# Patient Record
Sex: MALE | Race: WHITE | NOT HISPANIC OR LATINO | Employment: OTHER | URBAN - NONMETROPOLITAN AREA
[De-identification: names, ages, dates, MRNs, and addresses within clinical notes are randomized per-mention and may not be internally consistent; named-entity substitution may affect disease eponyms.]

---

## 2017-01-05 ENCOUNTER — APPOINTMENT (OUTPATIENT)
Dept: CARDIOLOGY | Facility: HOSPITAL | Age: 72
End: 2017-01-05

## 2017-01-11 ENCOUNTER — HOSPITAL ENCOUNTER (OUTPATIENT)
Dept: CARDIOLOGY | Facility: HOSPITAL | Age: 72
Discharge: HOME | End: 2017-01-11

## 2017-01-11 ENCOUNTER — OUTSIDE FACILITY SERVICE (OUTPATIENT)
Dept: CARDIOLOGY | Facility: CLINIC | Age: 72
End: 2017-01-11

## 2017-01-11 DIAGNOSIS — I49.8 OTHER CARDIAC ARRHYTHMIA: ICD-10-CM

## 2017-01-11 DIAGNOSIS — I48.91 ATRIAL FIBRILLATION, UNSPECIFIED TYPE (HCC): ICD-10-CM

## 2017-01-11 DIAGNOSIS — R07.9 CHEST PAIN, UNSPECIFIED TYPE: ICD-10-CM

## 2017-01-11 DIAGNOSIS — R06.02 SHORTNESS OF BREATH: ICD-10-CM

## 2017-01-11 LAB
MAXIMAL PREDICTED HEART RATE: 149 BPM
STRESS TARGET HR: 127 BPM

## 2017-01-11 PROCEDURE — 93306 TTE W/DOPPLER COMPLETE: CPT | Performed by: INTERNAL MEDICINE

## 2017-01-11 PROCEDURE — 93017 CV STRESS TEST TRACING ONLY: CPT

## 2017-01-11 PROCEDURE — 93306 TTE W/DOPPLER COMPLETE: CPT

## 2017-01-11 PROCEDURE — 93018 CV STRESS TEST I&R ONLY: CPT | Performed by: INTERNAL MEDICINE

## 2017-01-11 PROCEDURE — 78452 HT MUSCLE IMAGE SPECT MULT: CPT | Performed by: INTERNAL MEDICINE

## 2017-01-11 PROCEDURE — 78452 HT MUSCLE IMAGE SPECT MULT: CPT

## 2017-01-11 PROCEDURE — 25010000002 REGADENOSON 0.4 MG/5ML SOLUTION: Performed by: INTERNAL MEDICINE

## 2017-01-11 PROCEDURE — 0 TECHNETIUM SESTAMIBI: Performed by: INTERNAL MEDICINE

## 2017-01-11 PROCEDURE — A9500 TC99M SESTAMIBI: HCPCS | Performed by: INTERNAL MEDICINE

## 2017-01-11 RX ADMIN — REGADENOSON 0.4 MG: 0.08 INJECTION, SOLUTION INTRAVENOUS at 12:00

## 2017-01-11 RX ADMIN — Medication 1 DOSE: at 12:00

## 2017-01-13 ENCOUNTER — TELEPHONE (OUTPATIENT)
Dept: CARDIOLOGY | Facility: CLINIC | Age: 72
End: 2017-01-13

## 2017-01-17 ENCOUNTER — OFFICE VISIT (OUTPATIENT)
Dept: CARDIOLOGY | Facility: CLINIC | Age: 72
End: 2017-01-17

## 2017-01-17 VITALS
OXYGEN SATURATION: 95 % | SYSTOLIC BLOOD PRESSURE: 144 MMHG | HEIGHT: 69 IN | WEIGHT: 181.2 LBS | DIASTOLIC BLOOD PRESSURE: 99 MMHG | BODY MASS INDEX: 26.84 KG/M2

## 2017-01-17 DIAGNOSIS — R94.39 ABNORMAL STRESS TEST: ICD-10-CM

## 2017-01-17 DIAGNOSIS — I25.119 CORONARY ARTERY DISEASE INVOLVING NATIVE CORONARY ARTERY OF NATIVE HEART WITH ANGINA PECTORIS (HCC): Primary | ICD-10-CM

## 2017-01-17 DIAGNOSIS — I42.9 CARDIOMYOPATHY (HCC): ICD-10-CM

## 2017-01-17 DIAGNOSIS — Z00.00 HEALTHCARE MAINTENANCE: ICD-10-CM

## 2017-01-17 DIAGNOSIS — I10 ESSENTIAL HYPERTENSION: ICD-10-CM

## 2017-01-17 DIAGNOSIS — R93.1 ABNORMAL ECHOCARDIOGRAM: ICD-10-CM

## 2017-01-17 DIAGNOSIS — E78.5 DYSLIPIDEMIA: ICD-10-CM

## 2017-01-17 DIAGNOSIS — E78.5 HYPERLIPIDEMIA, UNSPECIFIED HYPERLIPIDEMIA TYPE: ICD-10-CM

## 2017-01-17 DIAGNOSIS — R06.02 SHORTNESS OF BREATH: ICD-10-CM

## 2017-01-17 DIAGNOSIS — I48.0 PAROXYSMAL ATRIAL FIBRILLATION (HCC): ICD-10-CM

## 2017-01-17 PROCEDURE — 99214 OFFICE O/P EST MOD 30 MIN: CPT | Performed by: NURSE PRACTITIONER

## 2017-01-17 RX ORDER — ALBUTEROL SULFATE 90 UG/1
AEROSOL, METERED RESPIRATORY (INHALATION)
COMMUNITY
Start: 2013-07-23

## 2017-01-17 RX ORDER — GABAPENTIN 800 MG/1
800 TABLET ORAL 3 TIMES DAILY PRN
COMMUNITY
Start: 2013-07-23

## 2017-01-17 RX ORDER — LANSOPRAZOLE 30 MG/1
30 CAPSULE, DELAYED RELEASE ORAL DAILY
COMMUNITY
Start: 2013-07-23

## 2017-01-17 RX ORDER — POTASSIUM CHLORIDE 750 MG/1
TABLET, EXTENDED RELEASE ORAL 2 TIMES DAILY
COMMUNITY
Start: 2014-03-27 | End: 2017-02-01 | Stop reason: SDUPTHER

## 2017-01-17 RX ORDER — MELOXICAM 7.5 MG/1
TABLET ORAL DAILY
COMMUNITY
Start: 2017-01-09 | End: 2017-01-17 | Stop reason: SDDI

## 2017-01-17 RX ORDER — CLOPIDOGREL BISULFATE 75 MG/1
75 TABLET ORAL DAILY
Qty: 30 TABLET | Refills: 11 | Status: SHIPPED | OUTPATIENT
Start: 2017-01-17 | End: 2017-01-18 | Stop reason: SDUPTHER

## 2017-01-17 RX ORDER — AMLODIPINE BESYLATE 5 MG/1
5 TABLET ORAL DAILY
COMMUNITY
Start: 2014-08-20 | End: 2017-10-27 | Stop reason: SDUPTHER

## 2017-01-17 RX ORDER — TAMSULOSIN HYDROCHLORIDE 0.4 MG/1
1 CAPSULE ORAL DAILY
COMMUNITY
Start: 2017-01-09 | End: 2019-01-07 | Stop reason: SDUPTHER

## 2017-01-17 RX ORDER — LUBIPROSTONE 24 UG/1
24 CAPSULE, GELATIN COATED ORAL DAILY PRN
COMMUNITY
Start: 2017-01-09 | End: 2018-07-03 | Stop reason: ALTCHOICE

## 2017-01-17 RX ORDER — LISINOPRIL 10 MG/1
TABLET ORAL 2 TIMES DAILY
COMMUNITY
Start: 2014-09-09 | End: 2017-10-27 | Stop reason: SDUPTHER

## 2017-01-17 RX ORDER — CLONAZEPAM 1 MG/1
1 TABLET ORAL 3 TIMES DAILY PRN
COMMUNITY
Start: 2013-07-23 | End: 2019-03-22

## 2017-01-17 RX ORDER — IPRATROPIUM BROMIDE AND ALBUTEROL SULFATE 2.5; .5 MG/3ML; MG/3ML
SOLUTION RESPIRATORY (INHALATION)
COMMUNITY
Start: 2013-07-23

## 2017-01-17 RX ORDER — POLYETHYLENE GLYCOL 3350 17 G/17G
POWDER, FOR SOLUTION ORAL DAILY
COMMUNITY
Start: 2013-08-27

## 2017-01-17 RX ORDER — SIMVASTATIN 10 MG
10 TABLET ORAL EVERY EVENING
COMMUNITY
Start: 2013-07-23 | End: 2018-04-27 | Stop reason: SDUPTHER

## 2017-01-17 RX ORDER — FUROSEMIDE 40 MG/1
TABLET ORAL DAILY
COMMUNITY
Start: 2013-07-01 | End: 2017-02-01 | Stop reason: SDUPTHER

## 2017-01-17 RX ORDER — METHOCARBAMOL 750 MG/1
1 TABLET, FILM COATED ORAL 4 TIMES DAILY PRN
COMMUNITY
Start: 2013-08-08

## 2017-01-17 RX ORDER — CARVEDILOL 6.25 MG/1
6.25 TABLET ORAL 2 TIMES DAILY
Qty: 180 TABLET | Refills: 3 | Status: SHIPPED | OUTPATIENT
Start: 2017-01-17 | End: 2017-01-18 | Stop reason: SDUPTHER

## 2017-01-17 RX ORDER — OXYCODONE HYDROCHLORIDE 30 MG/1
1 TABLET ORAL 4 TIMES DAILY
COMMUNITY
Start: 2013-07-23 | End: 2019-03-22

## 2017-01-17 NOTE — PATIENT INSTRUCTIONS
Coronary Angiogram  A coronary angiogram, also called coronary angiography, is an X-ray procedure used to look at the arteries in the heart. In this procedure, a dye (contrast dye) is injected through a long, hollow tube (catheter). The catheter is about the size of a piece of cooked spaghetti and is inserted through your groin, wrist, or arm. The dye is injected into each artery, and X-rays are then taken to show if there is a blockage in the arteries of your heart.  LET YOUR HEALTH CARE PROVIDER KNOW ABOUT:  · Any allergies you have, including allergies to shellfish or contrast dye.    · All medicines you are taking, including vitamins, herbs, eye drops, creams, and over-the-counter medicines.    · Previous problems you or members of your family have had with the use of anesthetics.    · Any blood disorders you have.    · Previous surgeries you have had.  · History of kidney problems or failure.    · Other medical conditions you have.  RISKS AND COMPLICATIONS   Generally, a coronary angiogram is a safe procedure. However, problems can occur and include:  · Allergic reaction to the dye.  · Bleeding from the access site or other locations.  · Kidney injury, especially in people with impaired kidney function.   · Stroke (rare).  · Heart attack (rare).  BEFORE THE PROCEDURE   · Do not eat or drink anything after midnight the night before the procedure or as directed by your health care provider.    · Ask your health care provider about changing or stopping your regular medicines. This is especially important if you are taking diabetes medicines or blood thinners.  PROCEDURE  · You may be given a medicine to help you relax (sedative) before the procedure. This medicine is given through an intravenous (IV) access tube that is inserted into one of your veins.    · The area where the catheter will be inserted will be washed and shaved. This is usually done in the groin but may be done in the fold of your arm (near your  "elbow) or in the wrist.     · A medicine will be given to numb the area where the catheter will be inserted (local anesthetic).    · The health care provider will insert the catheter into an artery. The catheter will be guided by using a special type of X-ray (fluoroscopy) of the blood vessel being examined.    · A special dye will then be injected into the catheter, and X-rays will be taken. The dye will help to show where any narrowing or blockages are located in the heart arteries.    AFTER THE PROCEDURE   · If the procedure is done through the leg, you will be kept in bed lying flat for several hours. You will be instructed to not bend or cross your legs.  · The insertion site will be checked frequently.    · The pulse in your feet or wrist will be checked frequently.    · Additional blood tests, X-rays, and an electrocardiogram may be done.       This information is not intended to replace advice given to you by your health care provider. Make sure you discuss any questions you have with your health care provider.     Document Released: 06/23/2004 Document Revised: 01/08/2016 Document Reviewed: 05/12/2014  [employer] Interactive Patient Education ©2016 [employer]  You Can Quit Smoking  If you are ready to quit smoking or are thinking about it, congratulations! You have chosen to help yourself be healthier and live longer! There are lots of different ways to quit smoking. Nicotine gum, nicotine patches, a nicotine inhaler, or nicotine nasal spray can help with physical craving. Hypnosis, support groups, and medicines help break the habit of smoking.  TIPS TO GET OFF AND STAY OFF CIGARETTES  · Learn to predict your moods. Do not let a bad situation be your excuse to have a cigarette. Some situations in your life might tempt you to have a cigarette.  · Ask friends and co-workers not to smoke around you.  · Make your home smoke-free.  · Never have \"just one\" cigarette. It leads to wanting another and another. " "Remind yourself of your decision to quit.  · On a card, make a list of your reasons for not smoking. Read it at least the same number of times a day as you have a cigarette. Tell yourself everyday, \"I do not want to smoke. I choose not to smoke.\"  · Ask someone at home or work to help you with your plan to quit smoking.  · Have something planned after you eat or have a cup of coffee. Take a walk or get other exercise to perk you up. This will help to keep you from overeating.  · Try a relaxation exercise to calm you down and decrease your stress. Remember, you may be tense and nervous the first two weeks after you quit. This will pass.  · Find new activities to keep your hands busy. Play with a pen, coin, or rubber band. Doodle or draw things on paper.  · Brush your teeth right after eating. This will help cut down the craving for the taste of tobacco after meals. You can try mouthwash too.  · Try gum, breath mints, or diet candy to keep something in your mouth.  IF YOU SMOKE AND WANT TO QUIT:  · Do not stock up on cigarettes. Never buy a carton. Wait until one pack is finished before you buy another.  · Never carry cigarettes with you at work or at home.  · Keep cigarettes as far away from you as possible. Leave them with someone else.  · Never carry matches or a lighter with you.  · Ask yourself, \"Do I need this cigarette or is this just a reflex?\"  · Bet with someone that you can quit. Put cigarette money in a [employer] bank every morning. If you smoke, you give up the money. If you do not smoke, by the end of the week, you keep the money.  · Keep trying. It takes 21 days to change a habit!  · Talk to your doctor about using medicines to help you quit. These include nicotine replacement gum, lozenges, or skin patches.     This information is not intended to replace advice given to you by your health care provider. Make sure you discuss any questions you have with your health care provider.     Document Released: " 10/14/2010 Document Revised: 03/11/2013 Document Reviewed: 10/14/2010  [employer] Interactive Patient Education ©2016 [employer]  Atrial Fibrillation  Atrial fibrillation is a type of irregular or rapid heartbeat (arrhythmia). In atrial fibrillation, the heart quivers continuously in a chaotic pattern. This occurs when parts of the heart receive disorganized signals that make the heart unable to pump blood normally. This can increase the risk for stroke, heart failure, and other heart-related conditions. There are different types of atrial fibrillation, including:  · Paroxysmal atrial fibrillation. This type starts suddenly, and it usually stops on its own shortly after it starts.  · Persistent atrial fibrillation. This type often lasts longer than a week. It may stop on its own or with treatment.  · Long-lasting persistent atrial fibrillation. This type lasts longer than 12 months.  · Permanent atrial fibrillation. This type does not go away.  Talk with your health care provider to learn about the type of atrial fibrillation that you have.  CAUSES  This condition is caused by some heart-related conditions or procedures, including:  · A heart attack.  · Coronary artery disease.  · Heart failure.  · Heart valve conditions.  · High blood pressure.  · Inflammation of the sac that surrounds the heart (pericarditis).  · Heart surgery.  · Certain heart rhythm disorders, such as Munoz-Parkinson-White syndrome.  Other causes include:  · Pneumonia.  · Obstructive sleep apnea.  · Blockage of an artery in the lungs (pulmonary embolism, or PE).  · Lung cancer.  · Chronic lung disease.  · Thyroid problems, especially if the thyroid is overactive (hyperthyroidism).  · Caffeine.  · Excessive alcohol use or illegal drug use.  · Use of some medicines, including certain decongestants and diet pills.  Sometimes, the cause cannot be found.  RISK FACTORS  This condition is more likely to develop in:  · People who are older in  age.  · People who smoke.  · People who have diabetes mellitus.  · People who are overweight (obese).  · Athletes who exercise vigorously.  SYMPTOMS  Symptoms of this condition include:  · A feeling that your heart is beating rapidly or irregularly.  · A feeling of discomfort or pain in your chest.  · Shortness of breath.  · Sudden light-headedness or weakness.  · Getting tired easily during exercise.  In some cases, there are no symptoms.  DIAGNOSIS  Your health care provider may be able to detect atrial fibrillation when taking your pulse. If detected, this condition may be diagnosed with:  · An electrocardiogram (ECG).  · A Holter monitor test that records your heartbeat patterns over a 24-hour period.  · Transthoracic echocardiogram (TTE) to evaluate how blood flows through your heart.  · Transesophageal echocardiogram (LINA) to view more detailed images of your heart.  · A stress test.  · Imaging tests, such as a CT scan or chest X-ray.  · Blood tests.  TREATMENT  The main goals of treatment are to prevent blood clots from forming and to keep your heart beating at a normal rate and rhythm. The type of treatment that you receive depends on many factors, such as your underlying medical conditions and how you feel when you are experiencing atrial fibrillation.  This condition may be treated with:  · Medicine to slow down the heart rate, bring the heart's rhythm back to normal, or prevent clots from forming.  · Electrical cardioversion. This is a procedure that resets your heart's rhythm by delivering a controlled, low-energy shock to the heart through your skin.  · Different types of ablation, such as catheter ablation, catheter ablation with pacemaker, or surgical ablation. These procedures destroy the heart tissues that send abnormal signals. When the pacemaker is used, it is placed under your skin to help your heart beat in a regular rhythm.  HOME CARE INSTRUCTIONS  · Take over-the counter and prescription  medicines only as told by your health care provider.  · If your health care provider prescribed a blood-thinning medicine (anticoagulant), take it exactly as told. Taking too much blood-thinning medicine can cause bleeding. If you do not take enough blood-thinning medicine, you will not have the protection that you need against stroke and other problems.  · Do not use tobacco products, including cigarettes, chewing tobacco, and e-cigarettes. If you need help quitting, ask your health care provider.  · If you have obstructive sleep apnea, manage your condition as told by your health care provider.  · Do not drink alcohol.  · Do not drink beverages that contain caffeine, such as coffee, soda, and tea.  · Maintain a healthy weight. Do not use diet pills unless your health care provider approves. Diet pills may make heart problems worse.  · Follow diet instructions as told by your health care provider.  · Exercise regularly as told by your health care provider.  · Keep all follow-up visits as told by your health care provider. This is important.  PREVENTION  · Avoid drinking beverages that contain caffeine or alcohol.  · Avoid certain medicines, especially medicines that are used for breathing problems.  · Avoid certain herbs and herbal medicines, such as those that contain ephedra or ginseng.  · Do not use illegal drugs, such as cocaine and amphetamines.  · Do not smoke.  · Manage your high blood pressure.  SEEK MEDICAL CARE IF:  · You notice a change in the rate, rhythm, or strength of your heartbeat.  · You are taking an anticoagulant and you notice increased bruising.  · You tire more easily when you exercise or exert yourself.  SEEK IMMEDIATE MEDICAL CARE IF:  · You have chest pain, abdominal pain, sweating, or weakness.  · You feel nauseous.  · You notice blood in your vomit, bowel movement, or urine.  · You have shortness of breath.  · You suddenly have swollen feet and ankles.  · You feel dizzy.  · You have  sudden weakness or numbness of the face, arm, or leg, especially on one side of the body.  · You have trouble speaking, trouble understanding, or both (aphasia).  · Your face or your eyelid droops on one side.  These symptoms may represent a serious problem that is an emergency. Do not wait to see if the symptoms will go away. Get medical help right away. Call your local emergency services (911 in the [place] Do not drive yourself to the hospital.     This information is not intended to replace advice given to you by your health care provider. Make sure you discuss any questions you have with your health care provider.     Document Released: 12/18/2006 Document Revised: 09/07/2016 Document Reviewed: 04/13/2016  [employer] Interactive Patient Education ©2016 [employer]  Coronary Artery Disease, Male  Coronary artery disease (CAD) is a process in which the blood vessels of the heart (coronary arteries) become narrow or blocked. The narrowing or blockage can lead to decreased blood flow to the heart muscle (angina). Prolonged reduced blood flow can cause a heart attack (myocardial infarction, MI). Because CAD is the leading cause of death in men, it is important to understand what causes this condition and how it is treated.  CAUSES  Atherosclerosis is the cause of CAD. This is the buildup of fat and cholesterol (plaque) on the inside of the arteries. Over time, the plaque may narrow or block the artery, and this will lessen blood flow to the heart. Plaque can also become weak and break off within a coronary artery to form a clot and cause a sudden blockage.  RISK FACTORS  Many risk factors increase your chances of getting CAD, including:  · High cholesterol levels.  · High blood pressure (hypertension).  · Tobacco use.  · Diabetes.  · Age. Men over age 45 are at a greater risk of CAD.  · Gender. Men often develop CAD earlier in life than women.  · Family history of CAD.  · Obesity.  · Lack of exercise.  · A diet high  in saturated fats.  SYMPTOMS   Many people do not experience any symptoms during the early stages of CAD. As the condition progresses, symptoms may include:   · Chest pain.  ¨ The pain can be described as a crushing or squeezing in the chest, or a tightness, pressure, fullness, or heaviness in the chest.  ¨ The pain can last more than a few minutes or can stop and recur.   · Pain in the arms, neck, jaw, or back.  · Unexplained heartburn or indigestion.  · Shortness of breath.  · Nausea.  · Sudden cold sweats.   Less common symptoms of CAD in men can include:  · Fatigue.  · Unexplained feelings of nervousness or anxiety.  · Weakness.  · Diarrhea.  · Sudden light-headedness.  DIAGNOSIS   Tests to diagnose CAD may include:  · ECG (electrocardiogram).  · Exercise stress test. This looks for signs of blockage when the heart is being exercised.  · Pharmacologic stress test. This test looks for signs of blockage when the heart is being stressed with a medicine.  · Blood tests.  · Coronary angiogram. This is a procedure to look at the coronary arteries to see if there is any blockage.  TREATMENT  The treatment of CAD may include the following:  · Healthy behavioral changes to reduce or control risk factors.  · Medicine.  · Coronary stenting. A stent helps to keep an artery open.  · Coronary angioplasty. This procedure widens a narrowed or blocked artery.  · Coronary artery bypass surgery. This will allow your blood to pass the blockage (bypass) to reach your heart.  HOME CARE INSTRUCTIONS  · Take medicines only as directed by your health care provider.  · Do not take the following medicines unless your health care provider approves:    Nonsteroidal anti-inflammatory drugs (NSAIDs), such as ibuprofen, naproxen, or celecoxib.    Vitamin supplements that contain vitamin A, vitamin E, or both.  · Manage other health conditions such as hypertension and diabetes as directed by your health care provider.  · Follow a heart-healthy  diet. A dietitian can help to educate you about healthy food options and changes.  · Use healthy cooking methods such as roasting, grilling, broiling, baking, poaching, steaming, or stir-frying. Talk to a dietitian to learn more about healthy cooking methods.  · Follow an exercise program approved by your health care provider.  · Maintain a healthy weight. Lose weight as approved by your health care provider.  · Plan rest periods when fatigued.  · Learn to manage stress.  · Do not use any tobacco products, including cigarettes, chewing tobacco, or electronic cigarettes. If you need help quitting, ask your health care provider.  · If you drink alcohol, and your health care provider approves, limit your alcohol intake to no more than 1 drink per day. One drink equals 12 ounces of beer, 5 ounces of wine, or 1½ ounces of hard liquor.  · Stop illegal drug use.  · Your health care provider may ask you to monitor your blood pressure. A blood pressure reading consists of a higher number over a lower number, such as 110 over 72, which is written as 110/72. Ideally, your blood pressure should be:    Below 140/90 if you have no other medical conditions.    Below 130/80 if you have diabetes or kidney disease.  · Keep all follow-up visits as directed by your health care provider. This is important.  SEEK IMMEDIATE MEDICAL CARE IF:  · You have pain in your chest, neck, arm, jaw, stomach, or back that lasts more than a few minutes, is recurring, or is unrelieved by taking medicine under your tongue (sublingual nitroglycerin).  · You have profuse sweating without cause.  · You have unexplained:    Heartburn or indigestion.    Shortness of breath or difficulty breathing.    Nausea or vomiting.    Fatigue.    Feelings of nervousness or anxiety.    Weakness.    Diarrhea.  · You have sudden light-headedness or dizziness.  · You faint.  These symptoms may represent a serious problem that is an emergency. Do not wait to see if the  symptoms will go away. Get medical help right away. Call your local emergency services (911 in the [place] Do not drive yourself to the hospital.     This information is not intended to replace advice given to you by your health care provider. Make sure you discuss any questions you have with your health care provider.     Document Released: 07/15/2015 Document Reviewed: 07/15/2015  [employer] Interactive Patient Education ©2016 [employer]

## 2017-01-17 NOTE — MR AVS SNAPSHOT
Kandice Nuñez   1/17/2017 2:30 PM   Office Visit    Dept Phone:  [phone]   Encounter #:  [id number]    Provider:  MARCELA Escobedo   Department:  [facility] CARDIOLOGY                Your Full Care Plan              Today's Medication Changes          These changes are accurate as of: 1/17/17  3:23 PM.  If you have any questions, ask your nurse or doctor.               New Medication(s)Ordered:     carvedilol 6.25 MG tablet   Commonly known as:  COREG   Take 1 tablet by mouth 2 (Two) Times a Day.   Started by:  MARCELA Escobedo       clopidogrel 75 MG tablet   Commonly known as:  PLAVIX   Take 1 tablet by mouth Daily.   Started by:  MARCELA Escobedo         Stop taking medication(s)listed here:     meloxicam 7.5 MG tablet   Commonly known as:  MOBIC   Stopped by:  MARCELA Escobedo                Where to Get Your Medications      These medications were sent to [facility] [place], - [facility] [address] - [phone] [facility] [phone]   [address], [facility] [address]     Phone:  [phone]     carvedilol 6.25 MG tablet    clopidogrel 75 MG tablet                  Your Updated Medication List          This list is accurate as of: 1/17/17  3:23 PM.  Always use your most recent med list.                amiodarone 200 MG tablet   Commonly known as:  PACERONE   Take 1 tablet by mouth Daily. 200mg three times a day for 2 weeks  200mg daily after the 2 weeks       AMITIZA 24 MCG capsule   Generic drug:  lubiprostone       amLODIPine 5 MG tablet   Commonly known as:  NORVASC       aspirin 81 MG tablet       carvedilol 6.25 MG tablet   Commonly known as:  COREG   Take 1 tablet by mouth 2 (Two) Times a Day.       clopidogrel 75 MG tablet   Commonly known as:  PLAVIX   Take 1 tablet by mouth Daily.       furosemide 40 MG tablet   Commonly known as:  LASIX       ipratropium-albuterol 0.5-2.5 mg/mL nebulizer   Commonly  known as:  DUO-NEB       KLONOPIN 1 MG tablet   Generic drug:  clonazePAM       lisinopril 10 MG tablet   Commonly known as:  PRINIVIL,ZESTRIL       methocarbamol 750 MG tablet   Commonly known as:  ROBAXIN       MIRALAX powder   Generic drug:  polyethylene glycol       NEURONTIN 800 MG tablet   Generic drug:  gabapentin       potassium chloride 10 MEQ CR tablet   Commonly known as:  K-DUR,KLOR-CON       PREVACID 30 MG capsule   Generic drug:  lansoprazole       PROAIR  (90 BASE) MCG/ACT inhaler   Generic drug:  albuterol       ROXICODONE 30 MG immediate release tablet   Generic drug:  oxyCODONE       simvastatin 10 MG tablet   Commonly known as:  ZOCOR       tamsulosin 0.4 MG capsule 24 hr capsule   Commonly known as:  FLOMAX               We Performed the Following     Cardiac catheterization     Comprehensive Metabolic Panel     Lipid Panel     TSH       You Were Diagnosed With        Codes Comments    Coronary artery disease involving native coronary artery of native heart with angina pectoris    -  Primary ICD-10-CM: I25.119  ICD-9-CM: 414.01, 413.9     Paroxysmal atrial fibrillation     ICD-10-CM: I48.0  ICD-9-CM: 427.31     Dyslipidemia     ICD-10-CM: E78.5  ICD-9-CM: 272.4     Hyperlipidemia, unspecified hyperlipidemia type     ICD-10-CM: E78.5  ICD-9-CM: 272.4     Essential hypertension     ICD-10-CM: I10  ICD-9-CM: 401.9     Shortness of breath     ICD-10-CM: R06.02  ICD-9-CM: 786.05     Cardiomyopathy     ICD-10-CM: I42.9  ICD-9-CM: 425.4     Abnormal stress test     ICD-10-CM: R94.39  ICD-9-CM: 794.39     Abnormal echocardiogram     ICD-10-CM: R93.1  ICD-9-CM: 793.2     Healthcare maintenance     ICD-10-CM: Z00.00  ICD-9-CM: V70.0       Instructions    Coronary Angiogram  A coronary angiogram, also called coronary angiography, is an X-ray procedure used to look at the arteries in the heart. In this procedure, a dye (contrast dye) is injected through a long, hollow tube (catheter). The catheter is  about the size of a piece of cooked spaghetti and is inserted through your groin, wrist, or arm. The dye is injected into each artery, and X-rays are then taken to show if there is a blockage in the arteries of your heart.  LET YOUR HEALTH CARE PROVIDER KNOW ABOUT:  · Any allergies you have, including allergies to shellfish or contrast dye.    · All medicines you are taking, including vitamins, herbs, eye drops, creams, and over-the-counter medicines.    · Previous problems you or members of your family have had with the use of anesthetics.    · Any blood disorders you have.    · Previous surgeries you have had.  · History of kidney problems or failure.    · Other medical conditions you have.  RISKS AND COMPLICATIONS   Generally, a coronary angiogram is a safe procedure. However, problems can occur and include:  · Allergic reaction to the dye.  · Bleeding from the access site or other locations.  · Kidney injury, especially in people with impaired kidney function.   · Stroke (rare).  · Heart attack (rare).  BEFORE THE PROCEDURE   · Do not eat or drink anything after midnight the night before the procedure or as directed by your health care provider.    · Ask your health care provider about changing or stopping your regular medicines. This is especially important if you are taking diabetes medicines or blood thinners.  PROCEDURE  · You may be given a medicine to help you relax (sedative) before the procedure. This medicine is given through an intravenous (IV) access tube that is inserted into one of your veins.    · The area where the catheter will be inserted will be washed and shaved. This is usually done in the groin but may be done in the fold of your arm (near your elbow) or in the wrist.     · A medicine will be given to numb the area where the catheter will be inserted (local anesthetic).    · The health care provider will insert the catheter into an artery. The catheter will be guided by using a special type  "of X-ray (fluoroscopy) of the blood vessel being examined.    · A special dye will then be injected into the catheter, and X-rays will be taken. The dye will help to show where any narrowing or blockages are located in the heart arteries.    AFTER THE PROCEDURE   · If the procedure is done through the leg, you will be kept in bed lying flat for several hours. You will be instructed to not bend or cross your legs.  · The insertion site will be checked frequently.    · The pulse in your feet or wrist will be checked frequently.    · Additional blood tests, X-rays, and an electrocardiogram may be done.       This information is not intended to replace advice given to you by your health care provider. Make sure you discuss any questions you have with your health care provider.     Document Released: 06/23/2004 Document Revised: 01/08/2016 Document Reviewed: 05/12/2014  [employer] Interactive Patient Education ©2016 [employer]  You Can Quit Smoking  If you are ready to quit smoking or are thinking about it, congratulations! You have chosen to help yourself be healthier and live longer! There are lots of different ways to quit smoking. Nicotine gum, nicotine patches, a nicotine inhaler, or nicotine nasal spray can help with physical craving. Hypnosis, support groups, and medicines help break the habit of smoking.  TIPS TO GET OFF AND STAY OFF CIGARETTES  · Learn to predict your moods. Do not let a bad situation be your excuse to have a cigarette. Some situations in your life might tempt you to have a cigarette.  · Ask friends and co-workers not to smoke around you.  · Make your home smoke-free.  · Never have \"just one\" cigarette. It leads to wanting another and another. Remind yourself of your decision to quit.  · On a card, make a list of your reasons for not smoking. Read it at least the same number of times a day as you have a cigarette. Tell yourself everyday, \"I do not want to smoke. I choose not to smoke.\"  · Ask " "someone at home or work to help you with your plan to quit smoking.  · Have something planned after you eat or have a cup of coffee. Take a walk or get other exercise to perk you up. This will help to keep you from overeating.  · Try a relaxation exercise to calm you down and decrease your stress. Remember, you may be tense and nervous the first two weeks after you quit. This will pass.  · Find new activities to keep your hands busy. Play with a pen, coin, or rubber band. Doodle or draw things on paper.  · Brush your teeth right after eating. This will help cut down the craving for the taste of tobacco after meals. You can try mouthwash too.  · Try gum, breath mints, or diet candy to keep something in your mouth.  IF YOU SMOKE AND WANT TO QUIT:  · Do not stock up on cigarettes. Never buy a carton. Wait until one pack is finished before you buy another.  · Never carry cigarettes with you at work or at home.  · Keep cigarettes as far away from you as possible. Leave them with someone else.  · Never carry matches or a lighter with you.  · Ask yourself, \"Do I need this cigarette or is this just a reflex?\"  · Bet with someone that you can quit. Put cigarette money in a pigMambu bank every morning. If you smoke, you give up the money. If you do not smoke, by the end of the week, you keep the money.  · Keep trying. It takes 21 days to change a habit!  · Talk to your doctor about using medicines to help you quit. These include nicotine replacement gum, lozenges, or skin patches.     This information is not intended to replace advice given to you by your health care provider. Make sure you discuss any questions you have with your health care provider.     Document Released: 10/14/2010 Document Revised: 03/11/2013 Document Reviewed: 10/14/2010  [employer] Interactive Patient Education ©2016 [employer]  Atrial Fibrillation  Atrial fibrillation is a type of irregular or rapid heartbeat (arrhythmia). In atrial fibrillation, the " heart quivers continuously in a chaotic pattern. This occurs when parts of the heart receive disorganized signals that make the heart unable to pump blood normally. This can increase the risk for stroke, heart failure, and other heart-related conditions. There are different types of atrial fibrillation, including:  · Paroxysmal atrial fibrillation. This type starts suddenly, and it usually stops on its own shortly after it starts.  · Persistent atrial fibrillation. This type often lasts longer than a week. It may stop on its own or with treatment.  · Long-lasting persistent atrial fibrillation. This type lasts longer than 12 months.  · Permanent atrial fibrillation. This type does not go away.  Talk with your health care provider to learn about the type of atrial fibrillation that you have.  CAUSES  This condition is caused by some heart-related conditions or procedures, including:  · A heart attack.  · Coronary artery disease.  · Heart failure.  · Heart valve conditions.  · High blood pressure.  · Inflammation of the sac that surrounds the heart (pericarditis).  · Heart surgery.  · Certain heart rhythm disorders, such as Munoz-Parkinson-White syndrome.  Other causes include:  · Pneumonia.  · Obstructive sleep apnea.  · Blockage of an artery in the lungs (pulmonary embolism, or PE).  · Lung cancer.  · Chronic lung disease.  · Thyroid problems, especially if the thyroid is overactive (hyperthyroidism).  · Caffeine.  · Excessive alcohol use or illegal drug use.  · Use of some medicines, including certain decongestants and diet pills.  Sometimes, the cause cannot be found.  RISK FACTORS  This condition is more likely to develop in:  · People who are older in age.  · People who smoke.  · People who have diabetes mellitus.  · People who are overweight (obese).  · Athletes who exercise vigorously.  SYMPTOMS  Symptoms of this condition include:  · A feeling that your heart is beating rapidly or irregularly.  · A feeling of  discomfort or pain in your chest.  · Shortness of breath.  · Sudden light-headedness or weakness.  · Getting tired easily during exercise.  In some cases, there are no symptoms.  DIAGNOSIS  Your health care provider may be able to detect atrial fibrillation when taking your pulse. If detected, this condition may be diagnosed with:  · An electrocardiogram (ECG).  · A Holter monitor test that records your heartbeat patterns over a 24-hour period.  · Transthoracic echocardiogram (TTE) to evaluate how blood flows through your heart.  · Transesophageal echocardiogram (LINA) to view more detailed images of your heart.  · A stress test.  · Imaging tests, such as a CT scan or chest X-ray.  · Blood tests.  TREATMENT  The main goals of treatment are to prevent blood clots from forming and to keep your heart beating at a normal rate and rhythm. The type of treatment that you receive depends on many factors, such as your underlying medical conditions and how you feel when you are experiencing atrial fibrillation.  This condition may be treated with:  · Medicine to slow down the heart rate, bring the heart's rhythm back to normal, or prevent clots from forming.  · Electrical cardioversion. This is a procedure that resets your heart's rhythm by delivering a controlled, low-energy shock to the heart through your skin.  · Different types of ablation, such as catheter ablation, catheter ablation with pacemaker, or surgical ablation. These procedures destroy the heart tissues that send abnormal signals. When the pacemaker is used, it is placed under your skin to help your heart beat in a regular rhythm.  HOME CARE INSTRUCTIONS  · Take over-the counter and prescription medicines only as told by your health care provider.  · If your health care provider prescribed a blood-thinning medicine (anticoagulant), take it exactly as told. Taking too much blood-thinning medicine can cause bleeding. If you do not take enough blood-thinning  medicine, you will not have the protection that you need against stroke and other problems.  · Do not use tobacco products, including cigarettes, chewing tobacco, and e-cigarettes. If you need help quitting, ask your health care provider.  · If you have obstructive sleep apnea, manage your condition as told by your health care provider.  · Do not drink alcohol.  · Do not drink beverages that contain caffeine, such as coffee, soda, and tea.  · Maintain a healthy weight. Do not use diet pills unless your health care provider approves. Diet pills may make heart problems worse.  · Follow diet instructions as told by your health care provider.  · Exercise regularly as told by your health care provider.  · Keep all follow-up visits as told by your health care provider. This is important.  PREVENTION  · Avoid drinking beverages that contain caffeine or alcohol.  · Avoid certain medicines, especially medicines that are used for breathing problems.  · Avoid certain herbs and herbal medicines, such as those that contain ephedra or ginseng.  · Do not use illegal drugs, such as cocaine and amphetamines.  · Do not smoke.  · Manage your high blood pressure.  SEEK MEDICAL CARE IF:  · You notice a change in the rate, rhythm, or strength of your heartbeat.  · You are taking an anticoagulant and you notice increased bruising.  · You tire more easily when you exercise or exert yourself.  SEEK IMMEDIATE MEDICAL CARE IF:  · You have chest pain, abdominal pain, sweating, or weakness.  · You feel nauseous.  · You notice blood in your vomit, bowel movement, or urine.  · You have shortness of breath.  · You suddenly have swollen feet and ankles.  · You feel dizzy.  · You have sudden weakness or numbness of the face, arm, or leg, especially on one side of the body.  · You have trouble speaking, trouble understanding, or both (aphasia).  · Your face or your eyelid droops on one side.  These symptoms may represent a serious problem that is an  emergency. Do not wait to see if the symptoms will go away. Get medical help right away. Call your local emergency services (911 in the [place] Do not drive yourself to the hospital.     This information is not intended to replace advice given to you by your health care provider. Make sure you discuss any questions you have with your health care provider.     Document Released: 12/18/2006 Document Revised: 09/07/2016 Document Reviewed: 04/13/2016  [employer] Interactive Patient Education ©2016 [employer]  Coronary Artery Disease, Male  Coronary artery disease (CAD) is a process in which the blood vessels of the heart (coronary arteries) become narrow or blocked. The narrowing or blockage can lead to decreased blood flow to the heart muscle (angina). Prolonged reduced blood flow can cause a heart attack (myocardial infarction, MI). Because CAD is the leading cause of death in men, it is important to understand what causes this condition and how it is treated.  CAUSES  Atherosclerosis is the cause of CAD. This is the buildup of fat and cholesterol (plaque) on the inside of the arteries. Over time, the plaque may narrow or block the artery, and this will lessen blood flow to the heart. Plaque can also become weak and break off within a coronary artery to form a clot and cause a sudden blockage.  RISK FACTORS  Many risk factors increase your chances of getting CAD, including:  · High cholesterol levels.  · High blood pressure (hypertension).  · Tobacco use.  · Diabetes.  · Age. Men over age 45 are at a greater risk of CAD.  · Gender. Men often develop CAD earlier in life than women.  · Family history of CAD.  · Obesity.  · Lack of exercise.  · A diet high in saturated fats.  SYMPTOMS   Many people do not experience any symptoms during the early stages of CAD. As the condition progresses, symptoms may include:   · Chest pain.  ¨ The pain can be described as a crushing or squeezing in the chest, or a tightness, pressure,  fullness, or heaviness in the chest.  ¨ The pain can last more than a few minutes or can stop and recur.   · Pain in the arms, neck, jaw, or back.  · Unexplained heartburn or indigestion.  · Shortness of breath.  · Nausea.  · Sudden cold sweats.   Less common symptoms of CAD in men can include:  · Fatigue.  · Unexplained feelings of nervousness or anxiety.  · Weakness.  · Diarrhea.  · Sudden light-headedness.  DIAGNOSIS   Tests to diagnose CAD may include:  · ECG (electrocardiogram).  · Exercise stress test. This looks for signs of blockage when the heart is being exercised.  · Pharmacologic stress test. This test looks for signs of blockage when the heart is being stressed with a medicine.  · Blood tests.  · Coronary angiogram. This is a procedure to look at the coronary arteries to see if there is any blockage.  TREATMENT  The treatment of CAD may include the following:  · Healthy behavioral changes to reduce or control risk factors.  · Medicine.  · Coronary stenting. A stent helps to keep an artery open.  · Coronary angioplasty. This procedure widens a narrowed or blocked artery.  · Coronary artery bypass surgery. This will allow your blood to pass the blockage (bypass) to reach your heart.  HOME CARE INSTRUCTIONS  · Take medicines only as directed by your health care provider.  · Do not take the following medicines unless your health care provider approves:    Nonsteroidal anti-inflammatory drugs (NSAIDs), such as ibuprofen, naproxen, or celecoxib.    Vitamin supplements that contain vitamin A, vitamin E, or both.  · Manage other health conditions such as hypertension and diabetes as directed by your health care provider.  · Follow a heart-healthy diet. A dietitian can help to educate you about healthy food options and changes.  · Use healthy cooking methods such as roasting, grilling, broiling, baking, poaching, steaming, or stir-frying. Talk to a dietitian to learn more about healthy cooking methods.  · Follow  an exercise program approved by your health care provider.  · Maintain a healthy weight. Lose weight as approved by your health care provider.  · Plan rest periods when fatigued.  · Learn to manage stress.  · Do not use any tobacco products, including cigarettes, chewing tobacco, or electronic cigarettes. If you need help quitting, ask your health care provider.  · If you drink alcohol, and your health care provider approves, limit your alcohol intake to no more than 1 drink per day. One drink equals 12 ounces of beer, 5 ounces of wine, or 1½ ounces of hard liquor.  · Stop illegal drug use.  · Your health care provider may ask you to monitor your blood pressure. A blood pressure reading consists of a higher number over a lower number, such as 110 over 72, which is written as 110/72. Ideally, your blood pressure should be:    Below 140/90 if you have no other medical conditions.    Below 130/80 if you have diabetes or kidney disease.  · Keep all follow-up visits as directed by your health care provider. This is important.  SEEK IMMEDIATE MEDICAL CARE IF:  · You have pain in your chest, neck, arm, jaw, stomach, or back that lasts more than a few minutes, is recurring, or is unrelieved by taking medicine under your tongue (sublingual nitroglycerin).  · You have profuse sweating without cause.  · You have unexplained:    Heartburn or indigestion.    Shortness of breath or difficulty breathing.    Nausea or vomiting.    Fatigue.    Feelings of nervousness or anxiety.    Weakness.    Diarrhea.  · You have sudden light-headedness or dizziness.  · You faint.  These symptoms may represent a serious problem that is an emergency. Do not wait to see if the symptoms will go away. Get medical help right away. Call your local emergency services (911 in the [place] Do not drive yourself to the hospital.     This information is not intended to replace advice given to you by your health care provider. Make sure you discuss any  "questions you have with your health care provider.     Document Released: 07/15/2015 Document Reviewed: 07/15/2015  [employer] Interactive Patient Education ©2016 [employer]       Patient Instructions History      Upcoming Appointments     Visit Type Date Time Department    FOLLOW UP 1/17/2017  2:30 PM [facility] JARED JOHNSON    OUTSIDE FACILITY 1/30/2017 12:00 PM [facility] JARED JOHNSON    [facility] 6/23/2017 11:00 AM [facility]ARED JOHNSON      MyChart Signup     Our records indicate that you have declined [facility] [employer] signup. If you would like to sign up for [employer], please email [email or web address] or call [phone] to obtain an activation code.             Other Info from Your Visit           Your Appointments     Jan 30, 2017 12:00 PM EST   Outside Facility with Cath Card Jared Johnson   [facility] CARDIOLOGY (--)    [address] [address]   [phone]            Jun 23, 2017 11:00 AM EDT   PACER with PACEMAKER CARD JARED JOHNSON   [facility] CARDIOLOGY (--)    [address] [place] [id number]   [phone]              Allergies     No Known Allergies      Reason for Visit     Follow-up testing f/u      Vital Signs     Blood Pressure Height Weight Oxygen Saturation Body Mass Index Smoking Status    144/99 (BP Location: Left arm, Patient Position: Sitting) 69\" (175.3 cm) 181 lb 3.2 oz (82.2 kg) 95% 26.76 kg/m2 Current Every Day Smoker      Problems and Diagnoses Noted     Atrial fibrillation (irregular heartbeat)    CAD (coronary artery disease), native coronary artery    Dyslipidemia    High cholesterol or triglycerides        High blood pressure        Shortness of breath        Heart muscle disorder        Abnormal stress test        Abnormal echocardiogram        Routine medical exam            "

## 2017-01-17 NOTE — PROGRESS NOTES
Subjective   Kandice Nuñez is a 71 y.o. male     Chief Complaint   Patient presents with   • Follow-up     testing f/u       HPI    Problem List:    1. CAD  1.1 [facility] 2012 - LT Main 10-20%; LAD 40-50%; Circ 100%; RCA 20-30% RCA and 30-40%; medical management   1.2 Stress Test 1/11/17 - Anterior ischemia; cannot rule out inferior as well; decreased LVEF; intermediate risk   2. Hypertension  3. Atrial Flutter Paroxysmal CHADS 2  3.1 No anticoagulation due to GIB   3.2 Event Monitor 12/15-12/28/16 - Atrial Fib, atrial flutter, Pacemaker tachycardia   4. 2:1 AVB with presyncope/Bradycardia symptomatic   4.1 Dual chamber PPM Guidant 10/8/14  5. Dyslipidemia   6. Shortness of breath  6.1 Echo 3/11/14 - mod LVH; EF 40-50%; mild left ventricular hypokinesis; mild MR; PA 36  6.2 Echo 1/11/17 - mild LVH; EF 35-40%; DD II; mild MR and TR; PA 35-40  7. Smoking Habituation    Patient is a 71-year-old male who presents today for a follow-up with jr at his side.  He denies any chest pain or pressure.  He says he did have palpitations/fluttering 1 weeks before Amiodarone, but he has not had any more.  He will get dizzy/lightheaded when his BP drops, but denies any presyncope or syncope.  He denies any orthopnea, PND or edema.  He says he will occasionally get short of breath with activity.  He will use his inhalers and says it will help.  He still smokes 2 PPD.    We went over echo, stress and event.     Current Outpatient Prescriptions   Medication Sig Dispense Refill   • albuterol (PROAIR HFA) 108 (90 BASE) MCG/ACT inhaler ProAir  (90 Base) MCG/ACT Inhalation Aerosol Solution; Patient Sig: ProAir  (90 Base) MCG/ACT Inhalation Aerosol Solution INHALE 1 TO 2 PUFFS EVERY 4 TO 6 HOURS AS NEEDED.; 0; 23-Jul-2013; Active     • amiodarone (PACERONE) 200 MG tablet Take 1 tablet by mouth Daily. 200mg three times a day for 2 weeks   200mg daily after the 2 weeks (Patient taking differently: Take 200 mg by mouth Daily.) 90  tablet 5   • AMITIZA 24 MCG capsule Take 24 mcg by mouth Daily As Needed.     • amLODIPine (NORVASC) 5 MG tablet Take  by mouth Daily.     • aspirin 81 MG tablet Take  by mouth Daily.     • clonazePAM (KLONOPIN) 1 MG tablet Take 1 mg by mouth 3 (Three) Times a Day As Needed.     • furosemide (LASIX) 40 MG tablet Take  by mouth Daily.     • gabapentin (NEURONTIN) 800 MG tablet Take 800 mg by mouth 3 (Three) Times a Day As Needed.     • ipratropium-albuterol (DUO-NEB) 0.5-2.5 mg/mL nebulizer Ipratropium-Albuterol 0.5-2.5 (3) MG/3ML Inhalation Solution; Patient Sig: Ipratropium-Albuterol 0.5-2.5 (3) MG/3ML Inhalation Solution USE 1 UNIT DOSE IN NEBULIZER EVERY 4 HOURS AS NEEDED.; 0; 23-Jul-2013; Active     • lansoprazole (PREVACID) 30 MG capsule Take  by mouth Daily.     • lisinopril (PRINIVIL,ZESTRIL) 10 MG tablet Take  by mouth 2 (Two) Times a Day.     • methocarbamol (ROBAXIN) 750 MG tablet Take 1 tablet by mouth 4 (Four) Times a Day As Needed.     • oxyCODONE (ROXICODONE) 30 MG immediate release tablet Take 1 tablet by mouth 4 (Four) Times a Day.     • polyethylene glycol (MIRALAX) powder Take  by mouth Daily.     • potassium chloride (K-DUR,KLOR-CON) 10 MEQ CR tablet Take  by mouth 2 (Two) Times a Day.     • simvastatin (ZOCOR) 10 MG tablet Take 10 mg by mouth Every Evening.     • tamsulosin (FLOMAX) 0.4 MG capsule 24 hr capsule Take 0.4 mg by mouth Daily.     • carvedilol (COREG) 6.25 MG tablet Take 1 tablet by mouth 2 (Two) Times a Day. 180 tablet 3   • clopidogrel (PLAVIX) 75 MG tablet Take 1 tablet by mouth Daily. 30 tablet 11     No current facility-administered medications for this visit.        ALLERGIES    Review of patient's allergies indicates no known allergies.    Past Medical History   Diagnosis Date   • 2Nd degree atrioventricular block 9/19/2016   • Acute renal insufficiency    • Anxiety    • Arthritis    • Asthma    • Atrial fibrillation 9/19/2016   • Benign prostatic hypertrophy with urinary  "obstruction 9/19/2016   • Bradycardia 9/19/2016   • CAD (coronary artery disease), native coronary artery 9/19/2016   • Chest pain 9/19/2016   • Congestive heart failure 9/19/2016   • COPD (chronic obstructive pulmonary disease)    • Degeneration of cervical intervertebral disc    • Depression    • Dyslipidemia 9/19/2016   • Hiatal hernia    • Hyperlipidemia    • Hypertension    • Hypogonadism male 9/19/2016   • Incomplete emptying of bladder 9/19/2016   • Male erectile disorder of organic origin 9/19/2016   • Myocardial infarction    • Sleep apnea    • Thrombocytopenia 9/19/2016   • Urinary hesitancy 9/19/2016       Social History     Social History   • Marital status: Single     Spouse name: N/A   • Number of children: N/A   • Years of education: N/A     Occupational History   • Not on file.     Social History Main Topics   • Smoking status: Current Every Day Smoker   • Smokeless tobacco: Not on file   • Alcohol use No   • Drug use: No   • Sexual activity: Not on file     Other Topics Concern   • Not on file     Social History Narrative       Family History   Problem Relation Age of Onset   • Diabetes Son        Review of Systems   Constitutional: Positive for fatigue (occas.). Negative for diaphoresis.   HENT: Positive for hearing loss (can't hear as good as he use to), postnasal drip, rhinorrhea, sinus pressure and sneezing.    Eyes: Positive for visual disturbance (wears glasses).   Respiratory: Positive for cough (cold related ) and shortness of breath (occas; when he is up moving around then will go sit down; will use inhaler ).    Cardiovascular: Positive for palpitations (1 x before he started the amiodarone ). Negative for chest pain and leg swelling.   Gastrointestinal: Negative for nausea and vomiting.        Uses Miralax   Endocrine: Negative.    Genitourinary: Positive for difficulty urinating (\"dribbling\" ).   Musculoskeletal: Positive for arthralgias, back pain (low ) and myalgias. Negative for neck " "pain.        Leg cramps at times.    Skin: Negative.    Allergic/Immunologic: Positive for environmental allergies.   Neurological: Positive for dizziness (occas when BP drops low ), light-headedness (occas; when BP goes low ) and headaches (at night one month ago for 3-4 days; right frontal area). Negative for syncope.   Hematological: Bruises/bleeds easily.   Psychiatric/Behavioral: Negative.        Objective   Visit Vitals   • /99 (BP Location: Left arm, Patient Position: Sitting)   • Ht 69\" (175.3 cm)   • Wt 181 lb 3.2 oz (82.2 kg)   • SpO2 95%   • BMI 26.76 kg/m2     Lab Results (most recent)     None        Physical Exam   Constitutional: He is oriented to person, place, and time. Vital signs are normal. He appears well-developed and well-nourished. He is active and cooperative.   HENT:   Head: Normocephalic.   Mouth/Throat: Dentures: edentulous  Abnormal dentition.   Eyes: Lids are normal.   Wears glasses    Neck: Normal carotid pulses, no hepatojugular reflux and no JVD present. Carotid bruit is not present.   Cardiovascular: Regular rhythm and normal heart sounds.  Tachycardia present.    Pulses:       Radial pulses are 2+ on the right side, and 2+ on the left side.        Dorsalis pedis pulses are 1+ on the right side, and 1+ on the left side.        Posterior tibial pulses are 1+ on the right side, and 1+ on the left side.   No edema BLE.    Pulmonary/Chest: Effort normal. He has rhonchi in the left upper field and the left lower field.   Abdominal: Normal appearance.   Neurological: He is alert and oriented to person, place, and time.   Skin: Skin is warm, dry and intact.   Psychiatric: He has a normal mood and affect. His speech is normal and behavior is normal. Judgment and thought content normal. Cognition and memory are normal.         Assessment/Plan      Diagnosis Plan   1. Coronary artery disease involving native coronary artery of native heart with angina pectoris  carvedilol (COREG) 6.25 " MG tablet    clopidogrel (PLAVIX) 75 MG tablet    Cardiac catheterization    Comprehensive Metabolic Panel    Lipid Panel    Comprehensive Metabolic Panel    Lipid Panel   2. Paroxysmal atrial fibrillation  Cardiac catheterization    TSH    TSH   3. Dyslipidemia  Cardiac catheterization    Lipid Panel    Lipid Panel   4. Hyperlipidemia, unspecified hyperlipidemia type  Cardiac catheterization   5. Essential hypertension  Cardiac catheterization   6. Shortness of breath  Cardiac catheterization   7. Cardiomyopathy  Cardiac catheterization   8. Abnormal stress test  Cardiac catheterization   9. Abnormal echocardiogram  Cardiac catheterization   10. Healthcare maintenance  Comprehensive Metabolic Panel    Lipid Panel    TSH    Comprehensive Metabolic Panel    Lipid Panel    TSH       Return for After testing.    We will proceed with LHC due to abnormal stress, below normal LVEF, history of CAD and smoking.  He will start Coreg and continue his medication regimen otherwise.  He will get lipids, CMP and TSH.  He will follow-up after LHC or sooner if any changes. He was encouraged on smoking cessation.

## 2017-01-18 DIAGNOSIS — I25.119 CORONARY ARTERY DISEASE INVOLVING NATIVE CORONARY ARTERY OF NATIVE HEART WITH ANGINA PECTORIS (HCC): ICD-10-CM

## 2017-01-18 RX ORDER — CARVEDILOL 6.25 MG/1
6.25 TABLET ORAL 2 TIMES DAILY
Qty: 180 TABLET | Refills: 3 | Status: SHIPPED | OUTPATIENT
Start: 2017-01-18 | End: 2017-10-27 | Stop reason: SDUPTHER

## 2017-01-18 RX ORDER — CLOPIDOGREL BISULFATE 75 MG/1
75 TABLET ORAL DAILY
Qty: 30 TABLET | Refills: 11 | Status: SHIPPED | OUTPATIENT
Start: 2017-01-18 | End: 2017-10-27 | Stop reason: SDUPTHER

## 2017-01-19 ENCOUNTER — TELEPHONE (OUTPATIENT)
Dept: CARDIOLOGY | Facility: CLINIC | Age: 72
End: 2017-01-19

## 2017-01-19 NOTE — TELEPHONE ENCOUNTER
----- Message from Scot Chavez sent at 1/19/2017  8:38 AM EST -----  Contact: ALEXEY    MURPHY IS HAVING PROBLEMS , FEELS REAL FULL AND WHEN HE USES HIS BREATHING MACHINE HE FEELS A LITTLE BETTER, DID NOT BREATH GOOD LAST NIGHT.  REQUESTING NURSE CALL BACK.    Spoke with Catherine and she states that he has improved since taking Breathing Treatment. Per MARCELA Aceves patient needs labs drawn and if condition does not improve to proceed to the ER. Catherine states that they live in [place]. She is going to take the patient to his PCP in [place] for Evaluation and Labs. Per MARCELA Aceves that is fine.

## 2017-01-27 ENCOUNTER — TELEPHONE (OUTPATIENT)
Dept: CARDIOLOGY | Facility: CLINIC | Age: 72
End: 2017-01-27

## 2017-01-27 NOTE — TELEPHONE ENCOUNTER
----- Message from Dana Saab sent at 1/27/2017 11:23 AM EST -----  PT HAS CATH SCHEDULED FOR Monday. THEY NEED TO KNOW IF HE NEEDS TO D/C HIS PLAVIX PRIOR.  Instructed to continue Plavix prior to Cardiac Cath.

## 2017-01-30 ENCOUNTER — OUTSIDE FACILITY SERVICE (OUTPATIENT)
Dept: CARDIOLOGY | Facility: CLINIC | Age: 72
End: 2017-01-30

## 2017-01-30 PROCEDURE — 93458 L HRT ARTERY/VENTRICLE ANGIO: CPT | Performed by: INTERNAL MEDICINE

## 2017-01-30 PROCEDURE — 92928 PRQ TCAT PLMT NTRAC ST 1 LES: CPT | Performed by: INTERNAL MEDICINE

## 2017-01-30 PROCEDURE — 93571 IV DOP VEL&/PRESS C FLO 1ST: CPT | Performed by: INTERNAL MEDICINE

## 2017-01-30 PROCEDURE — 92978 ENDOLUMINL IVUS OCT C 1ST: CPT | Performed by: INTERNAL MEDICINE

## 2017-02-01 ENCOUNTER — OFFICE VISIT (OUTPATIENT)
Dept: CARDIOLOGY | Facility: CLINIC | Age: 72
End: 2017-02-01

## 2017-02-01 VITALS
OXYGEN SATURATION: 91 % | BODY MASS INDEX: 26.82 KG/M2 | SYSTOLIC BLOOD PRESSURE: 121 MMHG | WEIGHT: 181.1 LBS | HEIGHT: 69 IN | HEART RATE: 68 BPM | DIASTOLIC BLOOD PRESSURE: 82 MMHG

## 2017-02-01 DIAGNOSIS — I25.10 CORONARY ARTERY DISEASE INVOLVING NATIVE CORONARY ARTERY OF NATIVE HEART WITHOUT ANGINA PECTORIS: Primary | ICD-10-CM

## 2017-02-01 DIAGNOSIS — I48.0 PAROXYSMAL ATRIAL FIBRILLATION (HCC): ICD-10-CM

## 2017-02-01 DIAGNOSIS — I42.9 CARDIOMYOPATHY (HCC): ICD-10-CM

## 2017-02-01 DIAGNOSIS — E78.5 DYSLIPIDEMIA: ICD-10-CM

## 2017-02-01 PROCEDURE — 99213 OFFICE O/P EST LOW 20 MIN: CPT | Performed by: NURSE PRACTITIONER

## 2017-02-01 RX ORDER — FUROSEMIDE 40 MG/1
40 TABLET ORAL 2 TIMES DAILY
Qty: 60 TABLET | Refills: 5 | Status: SHIPPED | OUTPATIENT
Start: 2017-02-01 | End: 2017-09-28 | Stop reason: SDUPTHER

## 2017-02-01 RX ORDER — POTASSIUM CHLORIDE 20 MEQ/1
20 TABLET, EXTENDED RELEASE ORAL 2 TIMES DAILY
Qty: 60 TABLET | Refills: 5 | Status: SHIPPED | OUTPATIENT
Start: 2017-02-01 | End: 2017-07-31 | Stop reason: SDUPTHER

## 2017-02-01 NOTE — PATIENT INSTRUCTIONS
Edema  Edema is an abnormal buildup of fluids in your body tissues. Edema is somewhat dependent on gravity to pull the fluid to the lowest place in your body. That makes the condition more common in the legs and thighs (lower extremities). Painless swelling of the feet and ankles is common and becomes more likely as you get older. It is also common in looser tissues, like around your eyes.   When the affected area is squeezed, the fluid may move out of that spot and leave a dent for a few moments. This dent is called pitting.   CAUSES   There are many possible causes of edema. Eating too much salt and being on your feet or sitting for a long time can cause edema in your legs and ankles. Hot weather may make edema worse. Common medical causes of edema include:  · Heart failure.  · Liver disease.  · Kidney disease.  · Weak blood vessels in your legs.  · Cancer.  · An injury.  · Pregnancy.  · Some medications.  · Obesity.   SYMPTOMS   Edema is usually painless. Your skin may look swollen or shiny.   DIAGNOSIS   Your health care provider may be able to diagnose edema by asking about your medical history and doing a physical exam. You may need to have tests such as X-rays, an electrocardiogram, or blood tests to check for medical conditions that may cause edema.   TREATMENT   Edema treatment depends on the cause. If you have heart, liver, or kidney disease, you need the treatment appropriate for these conditions. General treatment may include:  · Elevation of the affected body part above the level of your heart.  · Compression of the affected body part. Pressure from elastic bandages or support stockings squeezes the tissues and forces fluid back into the blood vessels. This keeps fluid from entering the tissues.  · Restriction of fluid and salt intake.  · Use of a water pill (diuretic). These medications are appropriate only for some types of edema. They pull fluid out of your body and make you urinate more often. This  gets rid of fluid and reduces swelling, but diuretics can have side effects. Only use diuretics as directed by your health care provider.  HOME CARE INSTRUCTIONS   · Keep the affected body part above the level of your heart when you are lying down.    · Do not sit still or stand for prolonged periods.    · Do not put anything directly under your knees when lying down.  · Do not wear constricting clothing or garters on your upper legs.    · Exercise your legs to work the fluid back into your blood vessels. This may help the swelling go down.    · Wear elastic bandages or support stockings to reduce ankle swelling as directed by your health care provider.    · Eat a low-salt diet to reduce fluid if your health care provider recommends it.    · Only take medicines as directed by your health care provider.   SEEK MEDICAL CARE IF:   · Your edema is not responding to treatment.  · You have heart, liver, or kidney disease and notice symptoms of edema.  · You have edema in your legs that does not improve after elevating them.    · You have sudden and unexplained weight gain.  SEEK IMMEDIATE MEDICAL CARE IF:   · You develop shortness of breath or chest pain.    · You cannot breathe when you lie down.  · You develop pain, redness, or warmth in the swollen areas.    · You have heart, liver, or kidney disease and suddenly get edema.  · You have a fever and your symptoms suddenly get worse.  MAKE SURE YOU:   · Understand these instructions.  · Will watch your condition.  · Will get help right away if you are not doing well or get worse.     This information is not intended to replace advice given to you by your health care provider. Make sure you discuss any questions you have with your health care provider.     Document Released: 12/18/2006 Document Revised: 01/08/2016 Document Reviewed: 10/10/2014  [employer] Interactive Patient Education ©2016 [employer]  Cardiomyopathy  Cardiomyopathy is a long-term (chronic) disease of the  heart muscle (myocardium). Over time, the heart becomes abnormally large, thick, or stiff. This makes it harder for the heart to pump blood and can lead to heart failure.  There are several types of cardiomyopathy:  · Dilated cardiomyopathy. This type causes the ventricles become large and weak.  · Hypertrophic cardiomyopathy. This type causes the heart muscle to thicken.  · Restrictive cardiomyopathy. This type causes the heart muscle to become rigid and less elastic.  · Ischemic cardiomyopathy. This type involves narrowing arteries that cause the walls of the heart get thinner.  · Peripartum cardiomyopathy. This type occurs during pregnancy or shortly after pregnancy.  CAUSES  The cause of cardiomyopathy is often not known. In some cases, it is passed down (inherited) from a family member who also had cardiomyopathy. The disease may develop as a complication of another medical condition. These conditions can include:  · Diabetes.  · High blood pressure.  · Viral infection of the heart.  · Heart attack.  · Coronary heart disease.  RISK FACTORS  You may be more likely to develop cardiomyopathy if you:  · Have a family history of cardiomyopathy or other heart problems.  · Are overweight or obese.  · Use illegal drugs.  · Abuse alcohol.  · Have diabetes.  · Have another disease that can cause cardiomyopathy as a complication.  SIGNS AND SYMPTOMS  Often, cardiomyopathy has no signs or symptoms. If you do have symptoms, they may include:  · Shortness of breath, especially during activity.  · Fatigue.  · An irregular heartbeat (arrhythmia).  · Dizziness, light-headedness, or fainting.  · Chest pain.  · Swelling in the lower leg or ankle.  DIAGNOSIS  Your health care provider may suspect cardiomyopathy based on your symptoms and medical history. Your health care provider will also do a physical exam. Other tests done may include:  · Blood tests.  · Imaging studies of your heart. These may be done using:    X-rays to check  if your heart is enlarged.    Echocardiogram to show the size of your heart and how well it pumps.    MRI.  · A test to record the electrical activity of your heart (electrocardiogram or ECG).  · A test in which you wear a portable device (event monitor) to record your heart's electrical activity while you go about your day.  · A test to monitor your heart's activity while you exercise (stress test).    · A procedure to check the blood pressure and blood flow in your heart (cardiac catheterization).  · Injection of dye into your arteries before imaging studies are taken (angiogram).  · Removal of a sample of heart tissue (biopsy). The sample is examined for problems.  TREATMENT  Treatment depends on the type of cardiomyopathy you have and the severity of your symptoms. If you are not having any symptoms, you might not need treatment. If you need treatment, it may include:  · Lifestyle changes.    Quit smoking, if you smoke.    Maintain a healthy weight. Lose weight if directed by your health care provider.    Eat a healthy diet. Include plenty of fruits, vegetables, and whole grains.    Get regular exercise. Ask your health care provider to suggest some activities that are good for you.  · Medicine. You may need to take medicine to:    Lower your blood pressure.    Slow down your heart rate.    Keep your heart beating in a steady rhythm.    Clear excess fluids from your body.    Prevent blood clots.  · Surgery. You may need surgery to:    Repair a defect.    Remove thickened tissue.    Implant a device to treat serious heart rhythm problems (implantable cardioverter-defibrillator or ICD).    Replace your heart (heart transplant) if all other treatments have failed (end stage).  HOME CARE INSTRUCTIONS  · Take medicines only as directed by your health care provider.  · Eat a heart-healthy diet. Work with your health care provider or a registered dietitian to learn about healthy eating options.  · Maintain a healthy  weight and stay physically active.  · Do not use any tobacco products, including cigarettes, chewing tobacco, or electronic cigarettes. If you need help quitting, ask your health care provider.  · Work closely with your health care provider to manage chronic conditions, such as diabetes and high blood pressure.  · Limit alcohol intake to no more than one drink per day for nonpregnant women and no more than two drinks per day for men. One drink equals 12 ounces of beer, 5 ounces of wine, or 1½ ounces of hard liquor.  · Try to get at least 7 hours of sleep each night.  · Find ways to manage stress.  · Keep all follow-up visits as directed by your health care provider. This is important.  SEEK MEDICAL CARE IF:  · Your symptoms get worse, even after treatment.  · You have new symptoms.  SEEK IMMEDIATE MEDICAL CARE IF:  · You have severe chest pain.  · You have shortness of breath.  · You cough up pink, bubbly material.  · You have sudden sweating.  · You feel nauseous and vomit.  · You suddenly become light-headed or dizzy.  · You feel your heart beating very fast.  · It feels like your heart is skipping beats.  These symptoms may represent a serious problem that is an emergency. Do not wait to see if the symptoms will go away. Get medical help right away. Call your local emergency services (911 in the [place] Do not drive yourself to the hospital.     This information is not intended to replace advice given to you by your health care provider. Make sure you discuss any questions you have with your health care provider.     Document Released: 03/02/2006 Document Revised: 01/08/2016 Document Reviewed: 05/28/2015  [employer] Interactive Patient Education ©2016 [employer]  Coronary Artery Disease, Male  Coronary artery disease (CAD) is a process in which the blood vessels of the heart (coronary arteries) become narrow or blocked. The narrowing or blockage can lead to decreased blood flow to the heart muscle (angina).  Prolonged reduced blood flow can cause a heart attack (myocardial infarction, MI). Because CAD is the leading cause of death in men, it is important to understand what causes this condition and how it is treated.  CAUSES  Atherosclerosis is the cause of CAD. This is the buildup of fat and cholesterol (plaque) on the inside of the arteries. Over time, the plaque may narrow or block the artery, and this will lessen blood flow to the heart. Plaque can also become weak and break off within a coronary artery to form a clot and cause a sudden blockage.  RISK FACTORS  Many risk factors increase your chances of getting CAD, including:  · High cholesterol levels.  · High blood pressure (hypertension).  · Tobacco use.  · Diabetes.  · Age. Men over age 45 are at a greater risk of CAD.  · Gender. Men often develop CAD earlier in life than women.  · Family history of CAD.  · Obesity.  · Lack of exercise.  · A diet high in saturated fats.  SYMPTOMS   Many people do not experience any symptoms during the early stages of CAD. As the condition progresses, symptoms may include:   · Chest pain.  ¨ The pain can be described as a crushing or squeezing in the chest, or a tightness, pressure, fullness, or heaviness in the chest.  ¨ The pain can last more than a few minutes or can stop and recur.   · Pain in the arms, neck, jaw, or back.  · Unexplained heartburn or indigestion.  · Shortness of breath.  · Nausea.  · Sudden cold sweats.   Less common symptoms of CAD in men can include:  · Fatigue.  · Unexplained feelings of nervousness or anxiety.  · Weakness.  · Diarrhea.  · Sudden light-headedness.  DIAGNOSIS   Tests to diagnose CAD may include:  · ECG (electrocardiogram).  · Exercise stress test. This looks for signs of blockage when the heart is being exercised.  · Pharmacologic stress test. This test looks for signs of blockage when the heart is being stressed with a medicine.  · Blood tests.  · Coronary angiogram. This is a procedure  to look at the coronary arteries to see if there is any blockage.  TREATMENT  The treatment of CAD may include the following:  · Healthy behavioral changes to reduce or control risk factors.  · Medicine.  · Coronary stenting. A stent helps to keep an artery open.  · Coronary angioplasty. This procedure widens a narrowed or blocked artery.  · Coronary artery bypass surgery. This will allow your blood to pass the blockage (bypass) to reach your heart.  HOME CARE INSTRUCTIONS  · Take medicines only as directed by your health care provider.  · Do not take the following medicines unless your health care provider approves:    Nonsteroidal anti-inflammatory drugs (NSAIDs), such as ibuprofen, naproxen, or celecoxib.    Vitamin supplements that contain vitamin A, vitamin E, or both.  · Manage other health conditions such as hypertension and diabetes as directed by your health care provider.  · Follow a heart-healthy diet. A dietitian can help to educate you about healthy food options and changes.  · Use healthy cooking methods such as roasting, grilling, broiling, baking, poaching, steaming, or stir-frying. Talk to a dietitian to learn more about healthy cooking methods.  · Follow an exercise program approved by your health care provider.  · Maintain a healthy weight. Lose weight as approved by your health care provider.  · Plan rest periods when fatigued.  · Learn to manage stress.  · Do not use any tobacco products, including cigarettes, chewing tobacco, or electronic cigarettes. If you need help quitting, ask your health care provider.  · If you drink alcohol, and your health care provider approves, limit your alcohol intake to no more than 1 drink per day. One drink equals 12 ounces of beer, 5 ounces of wine, or 1½ ounces of hard liquor.  · Stop illegal drug use.  · Your health care provider may ask you to monitor your blood pressure. A blood pressure reading consists of a higher number over a lower number, such as 110  over 72, which is written as 110/72. Ideally, your blood pressure should be:    Below 140/90 if you have no other medical conditions.    Below 130/80 if you have diabetes or kidney disease.  · Keep all follow-up visits as directed by your health care provider. This is important.  SEEK IMMEDIATE MEDICAL CARE IF:  · You have pain in your chest, neck, arm, jaw, stomach, or back that lasts more than a few minutes, is recurring, or is unrelieved by taking medicine under your tongue (sublingual nitroglycerin).  · You have profuse sweating without cause.  · You have unexplained:    Heartburn or indigestion.    Shortness of breath or difficulty breathing.    Nausea or vomiting.    Fatigue.    Feelings of nervousness or anxiety.    Weakness.    Diarrhea.  · You have sudden light-headedness or dizziness.  · You faint.  These symptoms may represent a serious problem that is an emergency. Do not wait to see if the symptoms will go away. Get medical help right away. Call your local emergency services (911 in the [place] Do not drive yourself to the hospital.     This information is not intended to replace advice given to you by your health care provider. Make sure you discuss any questions you have with your health care provider.     Document Released: 07/15/2015 Document Reviewed: 07/15/2015  [employer] Interactive Patient Education ©2016 [employer]

## 2017-02-01 NOTE — PROGRESS NOTES
Chito Nuñez is a 71 y.o. male     Chief Complaint   Patient presents with   • Follow-up     presents for a cath follow up       HPI    Problem List:    1. CAD  1.1 [facility] 2012 - LT Main 10-20%; LAD 40-50%; Circ 100%; RCA 20-30% RCA and 30-40%; medical management   1.2 Stress Test 1/11/17 - Anterior ischemia; cannot rule out inferior as well; decreased LVEF; intermediate risk   1.3 [facility] 1/30/17 - Stent to RCA; EF 20-25%  2. Hypertension  3. Atrial Flutter Paroxysmal CHADS 2  3.1 No anticoagulation due to GIB   3.2 Event Monitor 12/15-12/28/16 - Atrial Fib, atrial flutter, Pacemaker tachycardia   4. 2:1 AVB with presyncope/Bradycardia symptomatic   4.1 Dual chamber PPM Guidant 10/8/14  5. Dyslipidemia   6. Shortness of breath  6.1 Echo 3/11/14 - mod LVH; EF 40-50%; mild left ventricular hypokinesis; mild MR; PA 36  6.2 Echo 1/11/17 - mild LVH; EF 35-40%; DD II; mild MR and TR; PA 35-40  7. Smoking Habituation - Quit 1/17/2017    Patient is a 71-year-old male who presents today for a follow-up s/p LHC and stent with his fiance at his side. He denies any chest pain or pressure, palpitations, fluttering, dizziness, presyncope, syncope, orthopnea or PND.  He does have a little edema.  He says this morning when he first got up he was a little short of breath for about 1 hr.  He says he was also upset this AM and his BP went up.      We went over LHC and EF.     Current Outpatient Prescriptions   Medication Sig Dispense Refill   • albuterol (PROAIR HFA) 108 (90 BASE) MCG/ACT inhaler ProAir  (90 Base) MCG/ACT Inhalation Aerosol Solution; Patient Sig: ProAir  (90 Base) MCG/ACT Inhalation Aerosol Solution INHALE 1 TO 2 PUFFS EVERY 4 TO 6 HOURS AS NEEDED.; 0; 23-Jul-2013; Active     • amiodarone (PACERONE) 200 MG tablet Take 1 tablet by mouth Daily. 200mg three times a day for 2 weeks   200mg daily after the 2 weeks (Patient taking differently: Take 200 mg by mouth Daily.) 90 tablet 5   • AMITIZA 24  MCG capsule Take 24 mcg by mouth Daily As Needed.     • amLODIPine (NORVASC) 5 MG tablet Take  by mouth Daily.     • aspirin 81 MG tablet Take  by mouth Daily.     • carvedilol (COREG) 6.25 MG tablet Take 1 tablet by mouth 2 (Two) Times a Day. 180 tablet 3   • clonazePAM (KLONOPIN) 1 MG tablet Take 1 mg by mouth 3 (Three) Times a Day As Needed.     • clopidogrel (PLAVIX) 75 MG tablet Take 1 tablet by mouth Daily. 30 tablet 11   • furosemide (LASIX) 40 MG tablet Take 1 tablet by mouth 2 (Two) Times a Day. 60 tablet 5   • gabapentin (NEURONTIN) 800 MG tablet Take 800 mg by mouth 3 (Three) Times a Day As Needed.     • ipratropium-albuterol (DUO-NEB) 0.5-2.5 mg/mL nebulizer Ipratropium-Albuterol 0.5-2.5 (3) MG/3ML Inhalation Solution; Patient Sig: Ipratropium-Albuterol 0.5-2.5 (3) MG/3ML Inhalation Solution USE 1 UNIT DOSE IN NEBULIZER EVERY 4 HOURS AS NEEDED.; 0; 23-Jul-2013; Active     • lansoprazole (PREVACID) 30 MG capsule Take  by mouth Daily.     • lisinopril (PRINIVIL,ZESTRIL) 10 MG tablet Take  by mouth 2 (Two) Times a Day.     • methocarbamol (ROBAXIN) 750 MG tablet Take 1 tablet by mouth 4 (Four) Times a Day As Needed.     • oxyCODONE (ROXICODONE) 30 MG immediate release tablet Take 1 tablet by mouth 4 (Four) Times a Day.     • polyethylene glycol (MIRALAX) powder Take  by mouth Daily.     • potassium chloride (K-DUR,KLOR-CON) 20 MEQ CR tablet Take 1 tablet by mouth 2 (Two) Times a Day. 60 tablet 5   • simvastatin (ZOCOR) 10 MG tablet Take 10 mg by mouth Every Evening.     • tamsulosin (FLOMAX) 0.4 MG capsule 24 hr capsule Take 0.4 mg by mouth Daily.       No current facility-administered medications for this visit.        ALLERGIES    Review of patient's allergies indicates no known allergies.    Past Medical History   Diagnosis Date   • 2Nd degree atrioventricular block 9/19/2016   • Acute renal insufficiency    • Anxiety    • Arthritis    • Asthma    • Atrial fibrillation 9/19/2016   • Benign prostatic  hypertrophy with urinary obstruction 9/19/2016   • Bradycardia 9/19/2016   • CAD (coronary artery disease), native coronary artery 9/19/2016   • Chest pain 9/19/2016   • Congestive heart failure 9/19/2016   • COPD (chronic obstructive pulmonary disease)    • Degeneration of cervical intervertebral disc    • Depression    • Dyslipidemia 9/19/2016   • Hiatal hernia    • Hyperlipidemia    • Hypertension    • Hypogonadism male 9/19/2016   • Incomplete emptying of bladder 9/19/2016   • Male erectile disorder of organic origin 9/19/2016   • Myocardial infarction    • Sleep apnea    • Thrombocytopenia 9/19/2016   • Urinary hesitancy 9/19/2016       Social History     Social History   • Marital status: Single     Spouse name: N/A   • Number of children: N/A   • Years of education: N/A     Occupational History   • Not on file.     Social History Main Topics   • Smoking status: Current Every Day Smoker   • Smokeless tobacco: Not on file   • Alcohol use No   • Drug use: No   • Sexual activity: Not on file     Other Topics Concern   • Not on file     Social History Narrative       Family History   Problem Relation Age of Onset   • Diabetes Son        Review of Systems   Constitutional: Positive for fatigue. Negative for diaphoresis.   HENT: Positive for rhinorrhea. Negative for sneezing.    Eyes: Positive for visual disturbance.   Respiratory: Positive for shortness of breath (for about 1 hour after he got up this AM. ). Negative for chest tightness.    Cardiovascular: Positive for leg swelling (today a little ). Negative for chest pain and palpitations.   Gastrointestinal: Negative for nausea and vomiting.   Endocrine: Negative.    Genitourinary: Positive for difficulty urinating.   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 Negative for neck pain.   Skin: Negative.    Allergic/Immunologic: Positive for environmental allergies.   Neurological: Negative for dizziness, syncope  "and light-headedness.   Hematological: Bruises/bleeds easily.   Psychiatric/Behavioral: The patient is nervous/anxious.        Objective   Visit Vitals   • /82 (BP Location: Left arm, Patient Position: Sitting)   • Pulse 68   • Ht 69\" (175.3 cm)   • Wt 181 lb 1.6 oz (82.1 kg)   • SpO2 91%   • BMI 26.74 kg/m2     Lab Results (most recent)     None        Physical Exam   Constitutional: He is oriented to person, place, and time. Vital signs are normal. He appears well-developed and well-nourished. He is active and cooperative.   HENT:   Head: Normocephalic.   Mouth/Throat: Abnormal dentition (edentulous ).   Eyes: Lids are normal.   Wears glasses.    Neck: Normal carotid pulses, no hepatojugular reflux and no JVD present. Carotid bruit is not present.   Cardiovascular: Normal rate, regular rhythm and normal heart sounds.   Occasional extrasystoles are present.   Pulses:       Radial pulses are 2+ on the right side, and 2+ on the left side.        Dorsalis pedis pulses are 2+ on the right side, and 2+ on the left side.        Posterior tibial pulses are 2+ on the right side, and 2+ on the left side.   Trace - 1+ edema BLE.    Pulmonary/Chest: Effort normal. He has decreased breath sounds in the right lower field and the left lower field.   Abdominal: Normal appearance.   Neurological: He is alert and oriented to person, place, and time.   Skin: Skin is warm, dry and intact.   Psychiatric: He has a normal mood and affect. His speech is normal and behavior is normal. Judgment and thought content normal. Cognition and memory are normal.       Procedure   Procedures         Assessment/Plan      Diagnosis Plan   1. Coronary artery disease involving native coronary artery of native heart without angina pectoris  Basic Metabolic Panel   2. Paroxysmal atrial fibrillation     3. Dyslipidemia     4. Cardiomyopathy  furosemide (LASIX) 40 MG tablet    potassium chloride (K-DUR,KLOR-CON) 20 MEQ CR tablet    Basic Metabolic " Panel       Return in about 6 weeks (around 3/15/2017).  We will start with increasing his Lasix to BID and his KCL to 20 meq BID.  He will continue his medication regimen otherwise for now.  They will monitor his BP/HR.  He will get set up for a life vest and we discussed this as well.  He will follow-up in 6 weeks or sooner if any changes.  Want to see how he is tolerating the Lasix BID and if we can increase Coreg.  He will get a BMP in 1 week.     Patient says he quit smoking the day that he saw me because when I asked him about smoking he said it made him feel bad.  He has not smoked for 2 weeks now.

## 2017-02-06 DIAGNOSIS — E78.5 HYPERLIPIDEMIA, UNSPECIFIED HYPERLIPIDEMIA TYPE: Primary | ICD-10-CM

## 2017-03-16 ENCOUNTER — OFFICE VISIT (OUTPATIENT)
Dept: CARDIOLOGY | Facility: CLINIC | Age: 72
End: 2017-03-16

## 2017-03-16 VITALS
WEIGHT: 182 LBS | SYSTOLIC BLOOD PRESSURE: 109 MMHG | HEIGHT: 69 IN | DIASTOLIC BLOOD PRESSURE: 72 MMHG | HEART RATE: 71 BPM | OXYGEN SATURATION: 96 % | BODY MASS INDEX: 26.96 KG/M2

## 2017-03-16 DIAGNOSIS — I10 ESSENTIAL HYPERTENSION: ICD-10-CM

## 2017-03-16 DIAGNOSIS — R06.02 SHORTNESS OF BREATH: ICD-10-CM

## 2017-03-16 DIAGNOSIS — I50.40 COMBINED SYSTOLIC AND DIASTOLIC CONGESTIVE HEART FAILURE, UNSPECIFIED CONGESTIVE HEART FAILURE CHRONICITY: Primary | ICD-10-CM

## 2017-03-16 DIAGNOSIS — I25.10 CORONARY ARTERY DISEASE INVOLVING NATIVE CORONARY ARTERY OF NATIVE HEART WITHOUT ANGINA PECTORIS: ICD-10-CM

## 2017-03-16 DIAGNOSIS — I48.0 PAROXYSMAL ATRIAL FIBRILLATION (HCC): ICD-10-CM

## 2017-03-16 DIAGNOSIS — E78.5 DYSLIPIDEMIA: ICD-10-CM

## 2017-03-16 DIAGNOSIS — I42.9 CARDIOMYOPATHY (HCC): ICD-10-CM

## 2017-03-16 PROCEDURE — 99213 OFFICE O/P EST LOW 20 MIN: CPT | Performed by: NURSE PRACTITIONER

## 2017-03-16 NOTE — PATIENT INSTRUCTIONS
Atrial Fibrillation  Atrial fibrillation is a type of irregular or rapid heartbeat (arrhythmia). In atrial fibrillation, the heart quivers continuously in a chaotic pattern. This occurs when parts of the heart receive disorganized signals that make the heart unable to pump blood normally. This can increase the risk for stroke, heart failure, and other heart-related conditions. There are different types of atrial fibrillation, including:  · Paroxysmal atrial fibrillation. This type starts suddenly, and it usually stops on its own shortly after it starts.  · Persistent atrial fibrillation. This type often lasts longer than a week. It may stop on its own or with treatment.  · Long-lasting persistent atrial fibrillation. This type lasts longer than 12 months.  · Permanent atrial fibrillation. This type does not go away.  Talk with your health care provider to learn about the type of atrial fibrillation that you have.  CAUSES  This condition is caused by some heart-related conditions or procedures, including:  · A heart attack.  · Coronary artery disease.  · Heart failure.  · Heart valve conditions.  · High blood pressure.  · Inflammation of the sac that surrounds the heart (pericarditis).  · Heart surgery.  · Certain heart rhythm disorders, such as Munoz-Parkinson-White syndrome.  Other causes include:  · Pneumonia.  · Obstructive sleep apnea.  · Blockage of an artery in the lungs (pulmonary embolism, or PE).  · Lung cancer.  · Chronic lung disease.  · Thyroid problems, especially if the thyroid is overactive (hyperthyroidism).  · Caffeine.  · Excessive alcohol use or illegal drug use.  · Use of some medicines, including certain decongestants and diet pills.  Sometimes, the cause cannot be found.  RISK FACTORS  This condition is more likely to develop in:  · People who are older in age.  · People who smoke.  · People who have diabetes mellitus.  · People who are overweight (obese).  · Athletes who exercise  vigorously.  SYMPTOMS  Symptoms of this condition include:  · A feeling that your heart is beating rapidly or irregularly.  · A feeling of discomfort or pain in your chest.  · Shortness of breath.  · Sudden light-headedness or weakness.  · Getting tired easily during exercise.  In some cases, there are no symptoms.  DIAGNOSIS  Your health care provider may be able to detect atrial fibrillation when taking your pulse. If detected, this condition may be diagnosed with:  · An electrocardiogram (ECG).  · A Holter monitor test that records your heartbeat patterns over a 24-hour period.  · Transthoracic echocardiogram (TTE) to evaluate how blood flows through your heart.  · Transesophageal echocardiogram (LINA) to view more detailed images of your heart.  · A stress test.  · Imaging tests, such as a CT scan or chest X-ray.  · Blood tests.  TREATMENT  The main goals of treatment are to prevent blood clots from forming and to keep your heart beating at a normal rate and rhythm. The type of treatment that you receive depends on many factors, such as your underlying medical conditions and how you feel when you are experiencing atrial fibrillation.  This condition may be treated with:  · Medicine to slow down the heart rate, bring the heart's rhythm back to normal, or prevent clots from forming.  · Electrical cardioversion. This is a procedure that resets your heart's rhythm by delivering a controlled, low-energy shock to the heart through your skin.  · Different types of ablation, such as catheter ablation, catheter ablation with pacemaker, or surgical ablation. These procedures destroy the heart tissues that send abnormal signals. When the pacemaker is used, it is placed under your skin to help your heart beat in a regular rhythm.  HOME CARE INSTRUCTIONS  · Take over-the counter and prescription medicines only as told by your health care provider.  · If your health care provider prescribed a blood-thinning medicine  (anticoagulant), take it exactly as told. Taking too much blood-thinning medicine can cause bleeding. If you do not take enough blood-thinning medicine, you will not have the protection that you need against stroke and other problems.  · Do not use tobacco products, including cigarettes, chewing tobacco, and e-cigarettes. If you need help quitting, ask your health care provider.  · If you have obstructive sleep apnea, manage your condition as told by your health care provider.  · Do not drink alcohol.  · Do not drink beverages that contain caffeine, such as coffee, soda, and tea.  · Maintain a healthy weight. Do not use diet pills unless your health care provider approves. Diet pills may make heart problems worse.  · Follow diet instructions as told by your health care provider.  · Exercise regularly as told by your health care provider.  · Keep all follow-up visits as told by your health care provider. This is important.  PREVENTION  · Avoid drinking beverages that contain caffeine or alcohol.  · Avoid certain medicines, especially medicines that are used for breathing problems.  · Avoid certain herbs and herbal medicines, such as those that contain ephedra or ginseng.  · Do not use illegal drugs, such as cocaine and amphetamines.  · Do not smoke.  · Manage your high blood pressure.  SEEK MEDICAL CARE IF:  · You notice a change in the rate, rhythm, or strength of your heartbeat.  · You are taking an anticoagulant and you notice increased bruising.  · You tire more easily when you exercise or exert yourself.  SEEK IMMEDIATE MEDICAL CARE IF:  · You have chest pain, abdominal pain, sweating, or weakness.  · You feel nauseous.  · You notice blood in your vomit, bowel movement, or urine.  · You have shortness of breath.  · You suddenly have swollen feet and ankles.  · You feel dizzy.  · You have sudden weakness or numbness of the face, arm, or leg, especially on one side of the body.  · You have trouble speaking,  trouble understanding, or both (aphasia).  · Your face or your eyelid droops on one side.  These symptoms may represent a serious problem that is an emergency. Do not wait to see if the symptoms will go away. Get medical help right away. Call your local emergency services (911 in the [place] Do not drive yourself to the hospital.     This information is not intended to replace advice given to you by your health care provider. Make sure you discuss any questions you have with your health care provider.     Document Released: 12/18/2006 Document Revised: 09/07/2016 Document Reviewed: 04/13/2016  [employer] Interactive Patient Education ©2016 [employer]  Cardiomyopathy  Cardiomyopathy is a long-term (chronic) disease of the heart muscle (myocardium). Over time, the heart becomes abnormally large, thick, or stiff. This makes it harder for the heart to pump blood and can lead to heart failure.  There are several types of cardiomyopathy:  · Dilated cardiomyopathy. This type causes the ventricles become large and weak.  · Hypertrophic cardiomyopathy. This type causes the heart muscle to thicken.  · Restrictive cardiomyopathy. This type causes the heart muscle to become rigid and less elastic.  · Ischemic cardiomyopathy. This type involves narrowing arteries that cause the walls of the heart get thinner.  · Peripartum cardiomyopathy. This type occurs during pregnancy or shortly after pregnancy.  CAUSES  The cause of cardiomyopathy is often not known. In some cases, it is passed down (inherited) from a family member who also had cardiomyopathy. The disease may develop as a complication of another medical condition. These conditions can include:  · Diabetes.  · High blood pressure.  · Viral infection of the heart.  · Heart attack.  · Coronary heart disease.  RISK FACTORS  You may be more likely to develop cardiomyopathy if you:  · Have a family history of cardiomyopathy or other heart problems.  · Are overweight or  obese.  · Use illegal drugs.  · Abuse alcohol.  · Have diabetes.  · Have another disease that can cause cardiomyopathy as a complication.  SIGNS AND SYMPTOMS  Often, cardiomyopathy has no signs or symptoms. If you do have symptoms, they may include:  · Shortness of breath, especially during activity.  · Fatigue.  · An irregular heartbeat (arrhythmia).  · Dizziness, light-headedness, or fainting.  · Chest pain.  · Swelling in the lower leg or ankle.  DIAGNOSIS  Your health care provider may suspect cardiomyopathy based on your symptoms and medical history. Your health care provider will also do a physical exam. Other tests done may include:  · Blood tests.  · Imaging studies of your heart. These may be done using:    X-rays to check if your heart is enlarged.    Echocardiogram to show the size of your heart and how well it pumps.    MRI.  · A test to record the electrical activity of your heart (electrocardiogram or ECG).  · A test in which you wear a portable device (event monitor) to record your heart's electrical activity while you go about your day.  · A test to monitor your heart's activity while you exercise (stress test).    · A procedure to check the blood pressure and blood flow in your heart (cardiac catheterization).  · Injection of dye into your arteries before imaging studies are taken (angiogram).  · Removal of a sample of heart tissue (biopsy). The sample is examined for problems.  TREATMENT  Treatment depends on the type of cardiomyopathy you have and the severity of your symptoms. If you are not having any symptoms, you might not need treatment. If you need treatment, it may include:  · Lifestyle changes.    Quit smoking, if you smoke.    Maintain a healthy weight. Lose weight if directed by your health care provider.    Eat a healthy diet. Include plenty of fruits, vegetables, and whole grains.    Get regular exercise. Ask your health care provider to suggest some activities that are good for  you.  · Medicine. You may need to take medicine to:    Lower your blood pressure.    Slow down your heart rate.    Keep your heart beating in a steady rhythm.    Clear excess fluids from your body.    Prevent blood clots.  · Surgery. You may need surgery to:    Repair a defect.    Remove thickened tissue.    Implant a device to treat serious heart rhythm problems (implantable cardioverter-defibrillator or ICD).    Replace your heart (heart transplant) if all other treatments have failed (end stage).  HOME CARE INSTRUCTIONS  · Take medicines only as directed by your health care provider.  · Eat a heart-healthy diet. Work with your health care provider or a registered dietitian to learn about healthy eating options.  · Maintain a healthy weight and stay physically active.  · Do not use any tobacco products, including cigarettes, chewing tobacco, or electronic cigarettes. If you need help quitting, ask your health care provider.  · Work closely with your health care provider to manage chronic conditions, such as diabetes and high blood pressure.  · Limit alcohol intake to no more than one drink per day for nonpregnant women and no more than two drinks per day for men. One drink equals 12 ounces of beer, 5 ounces of wine, or 1½ ounces of hard liquor.  · Try to get at least 7 hours of sleep each night.  · Find ways to manage stress.  · Keep all follow-up visits as directed by your health care provider. This is important.  SEEK MEDICAL CARE IF:  · Your symptoms get worse, even after treatment.  · You have new symptoms.  SEEK IMMEDIATE MEDICAL CARE IF:  · You have severe chest pain.  · You have shortness of breath.  · You cough up pink, bubbly material.  · You have sudden sweating.  · You feel nauseous and vomit.  · You suddenly become light-headed or dizzy.  · You feel your heart beating very fast.  · It feels like your heart is skipping beats.  These symptoms may represent a serious problem that is an emergency. Do not  wait to see if the symptoms will go away. Get medical help right away. Call your local emergency services (911 in the [place] Do not drive yourself to the hospital.     This information is not intended to replace advice given to you by your health care provider. Make sure you discuss any questions you have with your health care provider.     Document Released: 03/02/2006 Document Revised: 01/08/2016 Document Reviewed: 05/28/2015  [employer] Interactive Patient Education ©2016 [employer]

## 2017-03-22 ENCOUNTER — OFFICE VISIT (OUTPATIENT)
Dept: CARDIOLOGY | Facility: CLINIC | Age: 72
End: 2017-03-22

## 2017-03-22 ENCOUNTER — HOSPITAL ENCOUNTER (OUTPATIENT)
Dept: CARDIOLOGY | Facility: HOSPITAL | Age: 72
Discharge: HOME | End: 2017-03-22

## 2017-03-22 ENCOUNTER — OUTSIDE FACILITY SERVICE (OUTPATIENT)
Dept: CARDIOLOGY | Facility: CLINIC | Age: 72
End: 2017-03-22

## 2017-03-22 VITALS
HEART RATE: 112 BPM | BODY MASS INDEX: 27.7 KG/M2 | WEIGHT: 187 LBS | DIASTOLIC BLOOD PRESSURE: 90 MMHG | SYSTOLIC BLOOD PRESSURE: 132 MMHG | OXYGEN SATURATION: 97 % | HEIGHT: 69 IN

## 2017-03-22 DIAGNOSIS — I48.92 ATRIAL FLUTTER, UNSPECIFIED TYPE (HCC): ICD-10-CM

## 2017-03-22 DIAGNOSIS — I42.9 CARDIOMYOPATHY (HCC): Primary | ICD-10-CM

## 2017-03-22 DIAGNOSIS — E78.5 DYSLIPIDEMIA: ICD-10-CM

## 2017-03-22 DIAGNOSIS — I48.0 PAROXYSMAL ATRIAL FIBRILLATION (HCC): ICD-10-CM

## 2017-03-22 DIAGNOSIS — I25.10 CORONARY ARTERY DISEASE INVOLVING NATIVE CORONARY ARTERY OF NATIVE HEART WITHOUT ANGINA PECTORIS: ICD-10-CM

## 2017-03-22 LAB
MAXIMAL PREDICTED HEART RATE: 149 BPM
STRESS TARGET HR: 127 BPM

## 2017-03-22 PROCEDURE — 25010000002 HEPARIN FLUSH (PORCINE) 100 UNIT/ML SOLUTION: Performed by: INTERNAL MEDICINE

## 2017-03-22 PROCEDURE — 78472 GATED HEART PLANAR SINGLE: CPT | Performed by: INTERNAL MEDICINE

## 2017-03-22 PROCEDURE — A9560 TC99M LABELED RBC: HCPCS | Performed by: INTERNAL MEDICINE

## 2017-03-22 PROCEDURE — 99213 OFFICE O/P EST LOW 20 MIN: CPT | Performed by: NURSE PRACTITIONER

## 2017-03-22 PROCEDURE — 0 TECHNETIUM LABELED RED BLOOD CELLS: Performed by: INTERNAL MEDICINE

## 2017-03-22 PROCEDURE — 78472 GATED HEART PLANAR SINGLE: CPT

## 2017-03-22 RX ADMIN — Medication 1 DOSE: at 15:00

## 2017-03-22 RX ADMIN — HEPARIN SODIUM (PORCINE) LOCK FLUSH IV SOLN 100 UNIT/ML 10 UNITS: 100 SOLUTION at 15:00

## 2017-03-22 NOTE — PROGRESS NOTES
Chito Nuñez is a 71 y.o. male     Chief Complaint   Patient presents with   • Follow-up     presents as a follow up from testing       HPI    Problem List:    1. CAD  1.1 [facility] 2012 - LT Main 10-20%; LAD 40-50%; Circ 100%; RCA 20-30% RCA and 30-40%; medical management   1.2 Stress Test 1/11/17 - Anterior ischemia; cannot rule out inferior as well; decreased LVEF; intermediate risk   1.3 [facility] 1/30/17 - Stent to RCA; EF 20-25%  2. Hypertension  3. Atrial Flutter Paroxysmal CHADS 2  3.1 No anticoagulation due to GIB   3.2 Event Monitor 12/15-12/28/16 - Atrial Fib, atrial flutter, Pacemaker tachycardia   4. 2:1 AVB with presyncope/Bradycardia symptomatic   4.1 Dual chamber PPM Guidant 10/8/14  5. Dyslipidemia   6. Shortness of breath  6.1 Echo 3/11/14 - mod LVH; EF 40-50%; mild left ventricular hypokinesis; mild MR; PA 36  6.2 Echo 1/11/17 - mild LVH; EF 35-40%; DD II; mild MR and TR; PA 35-40  7. Smoking Habituation - Quit 1/17/2017  8. Cardiomyopathy   8.1 MUGA 3/22/17 - EF 40%    Patient is a 71-year-old male who presents today for a follow-up s/p MUGA scan today with his fiance at his side.  He denies any chest pain or pressure. He says he has one episode only where his HR was about 114.  He says otherwise he has felt really good.  He denies any dizziness, presyncope, syncope, orthopnea, PND or edema.  He denies any shortness of breath with activity.  He says that they did come out and switch his life vest and since they did that it has been much better.      We went over the MUGA scan and the fact that patient is now able to send his life vest back.      Current Outpatient Prescriptions   Medication Sig Dispense Refill   • albuterol (PROAIR HFA) 108 (90 BASE) MCG/ACT inhaler ProAir  (90 Base) MCG/ACT Inhalation Aerosol Solution; Patient Sig: ProAir  (90 Base) MCG/ACT Inhalation Aerosol Solution INHALE 1 TO 2 PUFFS EVERY 4 TO 6 HOURS AS NEEDED.; 0; 23-Jul-2013; Active     • amiodarone  (PACERONE) 200 MG tablet Take 1 tablet by mouth Daily. 200mg three times a day for 2 weeks   200mg daily after the 2 weeks (Patient taking differently: Take 200 mg by mouth Daily.) 90 tablet 5   • AMITIZA 24 MCG capsule Take 24 mcg by mouth Daily As Needed.     • amLODIPine (NORVASC) 5 MG tablet Take  by mouth Daily.     • aspirin 81 MG tablet Take  by mouth Daily.     • carvedilol (COREG) 6.25 MG tablet Take 1 tablet by mouth 2 (Two) Times a Day. 180 tablet 3   • clonazePAM (KLONOPIN) 1 MG tablet Take 1 mg by mouth 3 (Three) Times a Day As Needed.     • clopidogrel (PLAVIX) 75 MG tablet Take 1 tablet by mouth Daily. 30 tablet 11   • furosemide (LASIX) 40 MG tablet Take 1 tablet by mouth 2 (Two) Times a Day. 60 tablet 5   • gabapentin (NEURONTIN) 800 MG tablet Take 800 mg by mouth 3 (Three) Times a Day As Needed.     • ipratropium-albuterol (DUO-NEB) 0.5-2.5 mg/mL nebulizer Ipratropium-Albuterol 0.5-2.5 (3) MG/3ML Inhalation Solution; Patient Sig: Ipratropium-Albuterol 0.5-2.5 (3) MG/3ML Inhalation Solution USE 1 UNIT DOSE IN NEBULIZER EVERY 4 HOURS AS NEEDED.; 0; 23-Jul-2013; Active     • lansoprazole (PREVACID) 30 MG capsule Take  by mouth Daily.     • lisinopril (PRINIVIL,ZESTRIL) 10 MG tablet Take  by mouth 2 (Two) Times a Day.     • methocarbamol (ROBAXIN) 750 MG tablet Take 1 tablet by mouth 4 (Four) Times a Day As Needed.     • oxyCODONE (ROXICODONE) 30 MG immediate release tablet Take 1 tablet by mouth 4 (Four) Times a Day.     • polyethylene glycol (MIRALAX) powder Take  by mouth Daily.     • potassium chloride (K-DUR,KLOR-CON) 20 MEQ CR tablet Take 1 tablet by mouth 2 (Two) Times a Day. 60 tablet 5   • simvastatin (ZOCOR) 10 MG tablet Take 10 mg by mouth Every Evening.     • tamsulosin (FLOMAX) 0.4 MG capsule 24 hr capsule Take 0.4 mg by mouth Daily.       No current facility-administered medications for this visit.        ALLERGIES    Review of patient's allergies indicates no known allergies.    Past  Medical History:   Diagnosis Date   • 2Nd degree atrioventricular block 9/19/2016   • Acute renal insufficiency    • Anxiety    • Arthritis    • Asthma    • Atrial fibrillation 9/19/2016   • Benign prostatic hypertrophy with urinary obstruction 9/19/2016   • Bradycardia 9/19/2016   • CAD (coronary artery disease), native coronary artery 9/19/2016   • Chest pain 9/19/2016   • Congestive heart failure 9/19/2016   • COPD (chronic obstructive pulmonary disease)    • Degeneration of cervical intervertebral disc    • Depression    • Dyslipidemia 9/19/2016   • Hiatal hernia    • Hyperlipidemia    • Hypertension    • Hypogonadism male 9/19/2016   • Incomplete emptying of bladder 9/19/2016   • Male erectile disorder of organic origin 9/19/2016   • Myocardial infarction    • Sleep apnea    • Thrombocytopenia 9/19/2016   • Urinary hesitancy 9/19/2016       Social History     Social History   • Marital status: Single     Spouse name: N/A   • Number of children: N/A   • Years of education: N/A     Occupational History   • Not on file.     Social History Main Topics   • Smoking status: Former Smoker   • Smokeless tobacco: Not on file   • Alcohol use No   • Drug use: No   • Sexual activity: Defer     Other Topics Concern   • Not on file     Social History Narrative       Family History   Problem Relation Age of Onset   • Diabetes Son        Review of Systems   Constitutional: Positive for fatigue. Negative for diaphoresis.   HENT: Negative for rhinorrhea and sneezing.    Eyes: Positive for visual disturbance (wears glasses ).   Respiratory: Negative for chest tightness and shortness of breath.    Cardiovascular: Positive for palpitations (1 X). Negative for chest pain and leg swelling.   Gastrointestinal: Negative for nausea and vomiting.   Endocrine: Negative.    Genitourinary: Negative for difficulty urinating.   Musculoskeletal: Positive for arthralgias and gait problem (uses a cane ). Negative for back pain and neck pain.  "  Skin: Negative.    Allergic/Immunologic: Negative.    Neurological: Negative for dizziness, syncope and light-headedness.   Hematological: Negative.    Psychiatric/Behavioral: Positive for dysphoric mood.       Objective   /90 (BP Location: Left arm, Patient Position: Sitting)  Pulse 112  Ht 69\" (175.3 cm)  Wt 187 lb (84.8 kg)  SpO2 97%  BMI 27.62 kg/m2  Lab Results (most recent)     None        Physical Exam   Constitutional: He is oriented to person, place, and time. He appears well-developed and well-nourished. He is active and cooperative.   HENT:   Head: Normocephalic.   Mouth/Throat: Abnormal dentition (edentulous ).   Eyes: Lids are normal.   Wears glasses    Neck: Normal carotid pulses, no hepatojugular reflux and no JVD present. Carotid bruit is not present.   Cardiovascular: Normal heart sounds.  An irregularly irregular rhythm present. Tachycardia present.    Pulses:       Radial pulses are 2+ on the right side, and 2+ on the left side.        Dorsalis pedis pulses are 2+ on the right side, and 2+ on the left side.        Posterior tibial pulses are 2+ on the right side, and 2+ on the left side.   No edema BLE.    Pulmonary/Chest: Effort normal and breath sounds normal.   Abdominal: Normal appearance.   Musculoskeletal:   Uses a cane    Neurological: He is alert and oriented to person, place, and time.   Skin: Skin is warm, dry and intact.   PPM STEPH   Psychiatric: He has a normal mood and affect. His speech is normal and behavior is normal. Judgment and thought content normal. Cognition and memory are normal.       Procedure   Procedures         Assessment/Plan      Diagnosis Plan   1. Cardiomyopathy     2. Coronary artery disease involving native coronary artery of native heart without angina pectoris     3. Paroxysmal atrial fibrillation     4. Dyslipidemia     5. Atrial flutter, unspecified type         Return Will call with follow-up.       Patient is in Afib today, however, from what " patient says does not sound like he is having very many episodes.  I will have him come back in and have his PPM re-interrogated to see how freq he is having Afib (burden).  Depending on this we will either schedule for a cardioversion or leave be.  Also they will find out who his gastroenterologist is and we will refer him back to see if he can be put on a different anticoagulation or not.  Patient will continue his current medication regimen for now.  I will make his follow-up based on findings on interrogation.      Patient will take off life vest and return to company due to EF now 40%.

## 2017-03-22 NOTE — PATIENT INSTRUCTIONS
Cardiomyopathy  Cardiomyopathy is a long-term (chronic) disease of the heart muscle (myocardium). Over time, the heart becomes abnormally large, thick, or stiff. This makes it harder for the heart to pump blood and can lead to heart failure.  There are several types of cardiomyopathy:  · Dilated cardiomyopathy. This type causes the ventricles become large and weak.  · Hypertrophic cardiomyopathy. This type causes the heart muscle to thicken.  · Restrictive cardiomyopathy. This type causes the heart muscle to become rigid and less elastic.  · Ischemic cardiomyopathy. This type involves narrowing arteries that cause the walls of the heart get thinner.  · Peripartum cardiomyopathy. This type occurs during pregnancy or shortly after pregnancy.  CAUSES  The cause of cardiomyopathy is often not known. In some cases, it is passed down (inherited) from a family member who also had cardiomyopathy. The disease may develop as a complication of another medical condition. These conditions can include:  · Diabetes.  · High blood pressure.  · Viral infection of the heart.  · Heart attack.  · Coronary heart disease.  RISK FACTORS  You may be more likely to develop cardiomyopathy if you:  · Have a family history of cardiomyopathy or other heart problems.  · Are overweight or obese.  · Use illegal drugs.  · Abuse alcohol.  · Have diabetes.  · Have another disease that can cause cardiomyopathy as a complication.  SIGNS AND SYMPTOMS  Often, cardiomyopathy has no signs or symptoms. If you do have symptoms, they may include:  · Shortness of breath, especially during activity.  · Fatigue.  · An irregular heartbeat (arrhythmia).  · Dizziness, light-headedness, or fainting.  · Chest pain.  · Swelling in the lower leg or ankle.  DIAGNOSIS  Your health care provider may suspect cardiomyopathy based on your symptoms and medical history. Your health care provider will also do a physical exam. Other tests done may include:  · Blood  tests.  · Imaging studies of your heart. These may be done using:    X-rays to check if your heart is enlarged.    Echocardiogram to show the size of your heart and how well it pumps.    MRI.  · A test to record the electrical activity of your heart (electrocardiogram or ECG).  · A test in which you wear a portable device (event monitor) to record your heart's electrical activity while you go about your day.  · A test to monitor your heart's activity while you exercise (stress test).    · A procedure to check the blood pressure and blood flow in your heart (cardiac catheterization).  · Injection of dye into your arteries before imaging studies are taken (angiogram).  · Removal of a sample of heart tissue (biopsy). The sample is examined for problems.  TREATMENT  Treatment depends on the type of cardiomyopathy you have and the severity of your symptoms. If you are not having any symptoms, you might not need treatment. If you need treatment, it may include:  · Lifestyle changes.    Quit smoking, if you smoke.    Maintain a healthy weight. Lose weight if directed by your health care provider.    Eat a healthy diet. Include plenty of fruits, vegetables, and whole grains.    Get regular exercise. Ask your health care provider to suggest some activities that are good for you.  · Medicine. You may need to take medicine to:    Lower your blood pressure.    Slow down your heart rate.    Keep your heart beating in a steady rhythm.    Clear excess fluids from your body.    Prevent blood clots.  · Surgery. You may need surgery to:    Repair a defect.    Remove thickened tissue.    Implant a device to treat serious heart rhythm problems (implantable cardioverter-defibrillator or ICD).    Replace your heart (heart transplant) if all other treatments have failed (end stage).  HOME CARE INSTRUCTIONS  · Take medicines only as directed by your health care provider.  · Eat a heart-healthy diet. Work with your health care provider or a  registered dietitian to learn about healthy eating options.  · Maintain a healthy weight and stay physically active.  · Do not use any tobacco products, including cigarettes, chewing tobacco, or electronic cigarettes. If you need help quitting, ask your health care provider.  · Work closely with your health care provider to manage chronic conditions, such as diabetes and high blood pressure.  · Limit alcohol intake to no more than one drink per day for nonpregnant women and no more than two drinks per day for men. One drink equals 12 ounces of beer, 5 ounces of wine, or 1½ ounces of hard liquor.  · Try to get at least 7 hours of sleep each night.  · Find ways to manage stress.  · Keep all follow-up visits as directed by your health care provider. This is important.  SEEK MEDICAL CARE IF:  · Your symptoms get worse, even after treatment.  · You have new symptoms.  SEEK IMMEDIATE MEDICAL CARE IF:  · You have severe chest pain.  · You have shortness of breath.  · You cough up pink, bubbly material.  · You have sudden sweating.  · You feel nauseous and vomit.  · You suddenly become light-headed or dizzy.  · You feel your heart beating very fast.  · It feels like your heart is skipping beats.  These symptoms may represent a serious problem that is an emergency. Do not wait to see if the symptoms will go away. Get medical help right away. Call your local emergency services (911 in the [place] Do not drive yourself to the hospital.     This information is not intended to replace advice given to you by your health care provider. Make sure you discuss any questions you have with your health care provider.     Document Released: 03/02/2006 Document Revised: 01/08/2016 Document Reviewed: 05/28/2015  [employer] Interactive Patient Education ©2016 [employer]

## 2017-04-06 ENCOUNTER — DOCUMENTATION (OUTPATIENT)
Dept: CARDIOLOGY | Facility: CLINIC | Age: 72
End: 2017-04-06

## 2017-04-06 NOTE — PROGRESS NOTES
Patient aware of MUGA results and aware to keep appt. Already in June. Verbalized he would. PH,LPN

## 2017-04-28 ENCOUNTER — OFFICE VISIT (OUTPATIENT)
Dept: CARDIOLOGY | Facility: CLINIC | Age: 72
End: 2017-04-28

## 2017-04-28 DIAGNOSIS — I42.9 CARDIOMYOPATHY (HCC): Primary | ICD-10-CM

## 2017-04-28 DIAGNOSIS — I48.91 ATRIAL FIBRILLATION, UNSPECIFIED TYPE (HCC): ICD-10-CM

## 2017-04-28 DIAGNOSIS — K92.2 GASTROINTESTINAL HEMORRHAGE, UNSPECIFIED GASTROINTESTINAL HEMORRHAGE TYPE: Primary | ICD-10-CM

## 2017-04-28 PROCEDURE — 93280 PM DEVICE PROGR EVAL DUAL: CPT | Performed by: INTERNAL MEDICINE

## 2017-04-28 NOTE — PROGRESS NOTES
Radha wants patient to get cardioverted. He has had a previous H/o GI bleed. He has to be placed on anti-coagulation before being cardioverted. He had GI bleed on Xarelto. Patient has to be cleared by GI before blood thinner can be started.

## 2017-05-23 RX ORDER — AMIODARONE HYDROCHLORIDE 200 MG/1
200 TABLET ORAL DAILY
Qty: 30 TABLET | Refills: 3 | Status: SHIPPED | OUTPATIENT
Start: 2017-05-23 | End: 2017-05-25 | Stop reason: SDUPTHER

## 2017-05-25 DIAGNOSIS — I48.0 PAROXYSMAL ATRIAL FIBRILLATION (HCC): Primary | ICD-10-CM

## 2017-05-25 RX ORDER — AMIODARONE HYDROCHLORIDE 200 MG/1
200 TABLET ORAL DAILY
Qty: 30 TABLET | Refills: 7 | Status: SHIPPED | OUTPATIENT
Start: 2017-05-25 | End: 2017-05-26 | Stop reason: SDUPTHER

## 2017-05-26 DIAGNOSIS — I48.0 PAROXYSMAL ATRIAL FIBRILLATION (HCC): ICD-10-CM

## 2017-05-26 RX ORDER — AMIODARONE HYDROCHLORIDE 200 MG/1
200 TABLET ORAL DAILY
Qty: 30 TABLET | Refills: 5 | Status: SHIPPED | OUTPATIENT
Start: 2017-05-26 | End: 2018-04-27 | Stop reason: SDUPTHER

## 2017-05-26 RX ORDER — AMIODARONE HYDROCHLORIDE 200 MG/1
200 TABLET ORAL DAILY
Qty: 30 TABLET | Refills: 5 | Status: SHIPPED | OUTPATIENT
Start: 2017-05-26 | End: 2017-05-26 | Stop reason: SDUPTHER

## 2017-07-31 ENCOUNTER — OFFICE VISIT (OUTPATIENT)
Dept: CARDIOLOGY | Facility: CLINIC | Age: 72
End: 2017-07-31

## 2017-07-31 VITALS
WEIGHT: 187.4 LBS | OXYGEN SATURATION: 92 % | HEART RATE: 70 BPM | HEIGHT: 69 IN | SYSTOLIC BLOOD PRESSURE: 113 MMHG | BODY MASS INDEX: 27.76 KG/M2 | DIASTOLIC BLOOD PRESSURE: 79 MMHG

## 2017-07-31 DIAGNOSIS — F17.200 SMOKING: ICD-10-CM

## 2017-07-31 DIAGNOSIS — E78.5 DYSLIPIDEMIA: ICD-10-CM

## 2017-07-31 DIAGNOSIS — I25.10 CORONARY ARTERY DISEASE INVOLVING NATIVE CORONARY ARTERY OF NATIVE HEART WITHOUT ANGINA PECTORIS: Primary | ICD-10-CM

## 2017-07-31 DIAGNOSIS — Z95.0 PACEMAKER: ICD-10-CM

## 2017-07-31 DIAGNOSIS — I48.0 PAROXYSMAL ATRIAL FIBRILLATION (HCC): ICD-10-CM

## 2017-07-31 PROCEDURE — 99214 OFFICE O/P EST MOD 30 MIN: CPT | Performed by: NURSE PRACTITIONER

## 2017-07-31 PROCEDURE — 93000 ELECTROCARDIOGRAM COMPLETE: CPT | Performed by: NURSE PRACTITIONER

## 2017-07-31 RX ORDER — BUDESONIDE AND FORMOTEROL FUMARATE DIHYDRATE 160; 4.5 UG/1; UG/1
2 AEROSOL RESPIRATORY (INHALATION) TAKE AS DIRECTED
COMMUNITY
Start: 2017-07-17

## 2017-07-31 RX ORDER — POTASSIUM CHLORIDE 750 MG/1
10 TABLET, FILM COATED, EXTENDED RELEASE ORAL 2 TIMES DAILY
COMMUNITY
Start: 2017-07-03 | End: 2017-08-18 | Stop reason: SDUPTHER

## 2017-07-31 RX ORDER — IPRATROPIUM/ALBUTEROL SULFATE 20-100 MCG
1 MIST INHALER (GRAM) INHALATION DAILY
COMMUNITY
Start: 2017-07-03

## 2017-07-31 RX ORDER — LISINOPRIL 20 MG/1
TABLET ORAL DAILY
COMMUNITY
Start: 2017-07-14 | End: 2017-07-31 | Stop reason: SDUPTHER

## 2017-07-31 RX ORDER — NICOTINE 21 MG/24HR
1 PATCH, TRANSDERMAL 24 HOURS TRANSDERMAL EVERY 24 HOURS
Qty: 28 PATCH | Refills: 3 | Status: SHIPPED | OUTPATIENT
Start: 2017-07-31 | End: 2018-04-27 | Stop reason: SDDI

## 2017-07-31 NOTE — PROGRESS NOTES
Subjective   Kandice Nuñez is a 72 y.o. male     Chief Complaint   Patient presents with   • Follow-up     patient appears in office for routine follow up ; patient denies any new onset cardiac issues       HPI    Problem List:    1. CAD  1.1 [facility] 2012 - LT Main 10-20%; LAD 40-50%; Circ 100%; RCA 20-30% RCA and 30-40%; medical management   1.2 Stress Test 1/11/17 - Anterior ischemia; cannot rule out inferior as well; decreased LVEF; intermediate risk   1.3 [facility] 1/30/17 - Stent to RCA; EF 20-25%  2. Hypertension  3. Atrial Flutter Paroxysmal CHADS 2  3.1 No anticoagulation due to GIB   3.2 Event Monitor 12/15-12/28/16 - Atrial Fib, atrial flutter, Pacemaker tachycardia   4. 2:1 AVB with presyncope/Bradycardia symptomatic   4.1 Dual chamber PPM Guidant 10/8/14  5. Dyslipidemia   6. Shortness of breath  6.1 Echo 3/11/14 - mod LVH; EF 40-50%; mild left ventricular hypokinesis; mild MR; PA 36  6.2 Echo 1/11/17 - mild LVH; EF 35-40%; DD II; mild MR and TR; PA 35-40  7. Smoking Habituation - Quit 1/17/2017  8. Cardiomyopathy   8.1 MUGA 3/22/17 - EF 40%    Patient is a 72-year-old male who presents today for a follow-up with his friend Bret at his side.  He denies any chest pain, pressure, palpitations, fluttering, dizziness, presyncope, syncope, orthopnea, PND or edema.  He will get short of breath when he exerts himself, he feels it is a little worse due to heat and he is back to smoking 1 PPD.  He has a GI work-up Monday.  He says since they adjusted his PPM he has felt much, much better.  He has been working around his place a lot.      Current Outpatient Prescriptions   Medication Sig Dispense Refill   • albuterol (PROAIR HFA) 108 (90 BASE) MCG/ACT inhaler ProAir  (90 Base) MCG/ACT Inhalation Aerosol Solution; Patient Sig: ProAir  (90 Base) MCG/ACT Inhalation Aerosol Solution INHALE 1 TO 2 PUFFS EVERY 4 TO 6 HOURS AS NEEDED.; 0; 23-Jul-2013; Active     • amiodarone (PACERONE) 200 MG tablet Take 1 tablet  by mouth Daily. 30 tablet 5   • AMITIZA 24 MCG capsule Take 24 mcg by mouth Daily As Needed.     • amLODIPine (NORVASC) 5 MG tablet Take 5 mg by mouth Daily.     • aspirin 81 MG tablet Take 81 mg by mouth Daily.     • carvedilol (COREG) 6.25 MG tablet Take 1 tablet by mouth 2 (Two) Times a Day. 180 tablet 3   • clonazePAM (KLONOPIN) 1 MG tablet Take 1 mg by mouth 3 (Three) Times a Day As Needed.     • clopidogrel (PLAVIX) 75 MG tablet Take 1 tablet by mouth Daily. 30 tablet 11   • COMBIVENT RESPIMAT  MCG/ACT inhaler Inhale 1 puff Daily.     • furosemide (LASIX) 40 MG tablet Take 1 tablet by mouth 2 (Two) Times a Day. 60 tablet 5   • gabapentin (NEURONTIN) 800 MG tablet Take 800 mg by mouth 3 (Three) Times a Day As Needed.     • ipratropium-albuterol (DUO-NEB) 0.5-2.5 mg/mL nebulizer Ipratropium-Albuterol 0.5-2.5 (3) MG/3ML Inhalation Solution; Patient Sig: Ipratropium-Albuterol 0.5-2.5 (3) MG/3ML Inhalation Solution USE 1 UNIT DOSE IN NEBULIZER EVERY 4 HOURS AS NEEDED.; 0; 23-Jul-2013; Active     • lansoprazole (PREVACID) 30 MG capsule Take 30 mg by mouth Daily.     • lisinopril (PRINIVIL,ZESTRIL) 10 MG tablet Take  by mouth 2 (Two) Times a Day.     • methocarbamol (ROBAXIN) 750 MG tablet Take 1 tablet by mouth 4 (Four) Times a Day As Needed.     • oxyCODONE (ROXICODONE) 30 MG immediate release tablet Take 1 tablet by mouth 4 (Four) Times a Day.     • polyethylene glycol (MIRALAX) powder Take  by mouth Daily.     • potassium chloride (K-DUR) 10 MEQ CR tablet Take 10 mEq by mouth 2 (Two) Times a Day.     • simvastatin (ZOCOR) 10 MG tablet Take 10 mg by mouth Every Evening.     • SYMBICORT 160-4.5 MCG/ACT inhaler Inhale 2 puffs Take As Directed.     • tamsulosin (FLOMAX) 0.4 MG capsule 24 hr capsule Take 1 capsule by mouth Daily.     • nicotine (NICODERM CQ) 21 MG/24HR patch Place 1 patch on the skin Daily. 28 patch 3     No current facility-administered medications for this visit.        ALLERGIES    Review of  patient's allergies indicates no known allergies.    Past Medical History:   Diagnosis Date   • 2Nd degree atrioventricular block 9/19/2016   • Acute renal insufficiency    • Anxiety    • Arthritis    • Asthma    • Atrial fibrillation 9/19/2016   • Benign prostatic hypertrophy with urinary obstruction 9/19/2016   • Bradycardia 9/19/2016   • CAD (coronary artery disease), native coronary artery 9/19/2016   • Chest pain 9/19/2016   • Congestive heart failure 9/19/2016   • COPD (chronic obstructive pulmonary disease)    • Degeneration of cervical intervertebral disc    • Depression    • Dyslipidemia 9/19/2016   • Hiatal hernia    • Hyperlipidemia    • Hypertension    • Hypogonadism male 9/19/2016   • Incomplete emptying of bladder 9/19/2016   • Male erectile disorder of organic origin 9/19/2016   • Myocardial infarction    • Sleep apnea    • Thrombocytopenia 9/19/2016   • Urinary hesitancy 9/19/2016       Social History     Social History   • Marital status: Single     Spouse name: N/A   • Number of children: N/A   • Years of education: N/A     Occupational History   • Not on file.     Social History Main Topics   • Smoking status: Current Every Day Smoker     Packs/day: 0.50     Types: Cigarettes   • Smokeless tobacco: Never Used   • Alcohol use No   • Drug use: No   • Sexual activity: Defer     Other Topics Concern   • Not on file     Social History Narrative       Family History   Problem Relation Age of Onset   • Diabetes Son        Review of Systems   Constitutional: Negative for diaphoresis and fatigue.   HENT: Positive for rhinorrhea and sneezing.    Eyes: Positive for visual disturbance (wears glasses daily).   Respiratory: Positive for shortness of breath (easily SOA; worse on exertion; has started smoking 1 PPD ). Negative for cough, chest tightness and wheezing.    Cardiovascular: Negative for chest pain, palpitations and leg swelling.   Gastrointestinal: Negative for abdominal pain, nausea and vomiting.  "  Endocrine: Negative for cold intolerance, heat intolerance, polyphagia and polyuria.   Genitourinary: Negative for difficulty urinating, frequency and urgency.   Musculoskeletal: Negative for arthralgias, back pain, myalgias, neck pain and neck stiffness.   Skin: Negative.  Negative for rash and wound.   Allergic/Immunologic: Positive for environmental allergies. Negative for food allergies.   Neurological: Negative for dizziness, weakness, light-headedness and headaches.   Hematological: Bruises/bleeds easily (bruises and bleeds easily).   Psychiatric/Behavioral: Negative for agitation, confusion and sleep disturbance. The patient is not nervous/anxious.        Objective   /79 (BP Location: Left arm, Patient Position: Sitting)  Pulse 70  Ht 69\" (175.3 cm)  Wt 187 lb 6.4 oz (85 kg)  SpO2 92%  BMI 27.67 kg/m2  Lab Results (most recent)     None        Physical Exam   Constitutional: He is oriented to person, place, and time. Vital signs are normal. He appears well-developed and well-nourished. He is active and cooperative.   HENT:   Head: Normocephalic.   Mouth/Throat: Abnormal dentition (edentulous ).   Eyes: Lids are normal.   Wears glasses    Cardiovascular: Normal rate and normal heart sounds.  An irregularly irregular rhythm present.   Pulses:       Radial pulses are 2+ on the right side, and 2+ on the left side.        Dorsalis pedis pulses are 2+ on the right side, and 2+ on the left side.        Posterior tibial pulses are 2+ on the right side, and 2+ on the left side.   No edema BLE.    Pulmonary/Chest: Effort normal. He has decreased breath sounds in the right lower field and the left lower field.   Abdominal: Normal appearance and bowel sounds are normal.   Musculoskeletal:        Right knee: Tenderness found.        Left knee: Tenderness found.   Neurological: He is alert and oriented to person, place, and time.   Skin: Skin is warm and dry. Abrasion (arms and legs) and bruising noted.   PPM " STEPH    Psychiatric: He has a normal mood and affect. His speech is normal and behavior is normal. Judgment and thought content normal. Cognition and memory are normal.       Procedure     ECG 12 Lead  Date/Time: 7/31/2017 3:24 PM  Performed by: KELLI TERAN  Authorized by: KELLI TERAN   Comparison: compared with previous ECG from 12/9/2016  Similar to previous ECG  Rhythm: atrial fibrillation and paced  Rate: normal  BPM: 70  Clinical impression: abnormal ECG                 Assessment/Plan      Diagnosis Plan   1. Coronary artery disease involving native coronary artery of native heart without angina pectoris  ECG 12 Lead   2. Paroxysmal atrial fibrillation  ECG 12 Lead   3. Dyslipidemia     4. Pacemaker     5. Smoking  nicotine (NICODERM CQ) 21 MG/24HR patch       Return in about 3 months (around 10/31/2017), or make same day as PPM check 10/27/17.         CAD  - patient is on ASA, plavix, beta and statin.  Persistent Afib - patient is only on ASA and Plavix as we are awaiting GI to advise if they recommend patient going back on anticoagulation.  He is on Amio as well.  Dyslipidemia - on zocor, monitored by PCP.  Smoking - patient encouraged on smoking cessation.  He will start Nicotine patches as he knows he needs to quit again.  PPM - doing well.  Patient will follow-up in 3 mos or sooner if any changes.  Patient will get PPM rechecked at this time as well.

## 2017-07-31 NOTE — PATIENT INSTRUCTIONS
Shortness of Breath  Shortness of breath means you have trouble breathing. It could also mean that you have a medical problem. You should get immediate medical care for shortness of breath.  CAUSES   · Not enough oxygen in the air such as with high altitudes or a smoke-filled room.  · Certain lung diseases, infections, or problems.  · Heart disease or conditions, such as angina or heart failure.  · Low red blood cells (anemia).  · Poor physical fitness, which can cause shortness of breath when you exercise.  · Chest or back injuries or stiffness.  · Being overweight.  · Smoking.  · Anxiety, which can make you feel like you are not getting enough air.  DIAGNOSIS   Serious medical problems can often be found during your physical exam. Tests may also be done to determine why you are having shortness of breath. Tests may include:  · Chest X-rays.  · Lung function tests.  · Blood tests.  · An electrocardiogram (ECG).  · An ambulatory electrocardiogram. An ambulatory ECG records your heartbeat patterns over a 24-hour period.  · Exercise testing.  · A transthoracic echocardiogram (TTE). During echocardiography, sound waves are used to evaluate how blood flows through your heart.  · A transesophageal echocardiogram (LINA).  · Imaging scans.  Your health care provider may not be able to find a cause for your shortness of breath after your exam. In this case, it is important to have a follow-up exam with your health care provider as directed.   TREATMENT   Treatment for shortness of breath depends on the cause of your symptoms and can vary greatly.  HOME CARE INSTRUCTIONS   · Do not smoke. Smoking is a common cause of shortness of breath. If you smoke, ask for help to quit.  · Avoid being around chemicals or things that may bother your breathing, such as paint fumes and dust.  · Rest as needed. Slowly resume your usual activities.  · If medicines were prescribed, take them as directed for the full length of time directed. This  "includes oxygen and any inhaled medicines.  · Keep all follow-up appointments as directed by your health care provider.  SEEK MEDICAL CARE IF:   · Your condition does not improve in the time expected.  · You have a hard time doing your normal activities even with rest.  · You have any new symptoms.  SEEK IMMEDIATE MEDICAL CARE IF:   · Your shortness of breath gets worse.  · You feel light-headed, faint, or develop a cough not controlled with medicines.  · You start coughing up blood.  · You have pain with breathing.  · You have chest pain or pain in your arms, shoulders, or abdomen.  · You have a fever.  · You are unable to walk up stairs or exercise the way you normally do.  MAKE SURE YOU:  · Understand these instructions.  · Will watch your condition.  · Will get help right away if you are not doing well or get worse.     This information is not intended to replace advice given to you by your health care provider. Make sure you discuss any questions you have with your health care provider.     Document Released: 09/12/2002 Document Revised: 12/23/2014 Document Reviewed: 03/04/2013  [employer] Interactive Patient Education ©2017 [employer]  You Can Quit Smoking  If you are ready to quit smoking or are thinking about it, congratulations! You have chosen to help yourself be healthier and live longer! There are lots of different ways to quit smoking. Nicotine gum, nicotine patches, a nicotine inhaler, or nicotine nasal spray can help with physical craving. Hypnosis, support groups, and medicines help break the habit of smoking.  TIPS TO GET OFF AND STAY OFF CIGARETTES  · Learn to predict your moods. Do not let a bad situation be your excuse to have a cigarette. Some situations in your life might tempt you to have a cigarette.  · Ask friends and co-workers not to smoke around you.  · Make your home smoke-free.  · Never have \"just one\" cigarette. It leads to wanting another and another. Remind yourself of your decision " "to quit.  · On a card, make a list of your reasons for not smoking. Read it at least the same number of times a day as you have a cigarette. Tell yourself everyday, \"I do not want to smoke. I choose not to smoke.\"  · Ask someone at home or work to help you with your plan to quit smoking.  · Have something planned after you eat or have a cup of coffee. Take a walk or get other exercise to perk you up. This will help to keep you from overeating.  · Try a relaxation exercise to calm you down and decrease your stress. Remember, you may be tense and nervous the first two weeks after you quit. This will pass.  · Find new activities to keep your hands busy. Play with a pen, coin, or rubber band. Doodle or draw things on paper.  · Brush your teeth right after eating. This will help cut down the craving for the taste of tobacco after meals. You can try mouthwash too.  · Try gum, breath mints, or diet candy to keep something in your mouth.  IF YOU SMOKE AND WANT TO QUIT:  · Do not stock up on cigarettes. Never buy a carton. Wait until one pack is finished before you buy another.  · Never carry cigarettes with you at work or at home.  · Keep cigarettes as far away from you as possible. Leave them with someone else.  · Never carry matches or a lighter with you.  · Ask yourself, \"Do I need this cigarette or is this just a reflex?\"  · Bet with someone that you can quit. Put cigarette money in a NovoED bank every morning. If you smoke, you give up the money. If you do not smoke, by the end of the week, you keep the money.  · Keep trying. It takes 21 days to change a habit!  · Talk to your doctor about using medicines to help you quit. These include nicotine replacement gum, lozenges, or skin patches.     This information is not intended to replace advice given to you by your health care provider. Make sure you discuss any questions you have with your health care provider.     Document Released: 10/14/2010 Document Revised: " 03/11/2013 Document Reviewed: 05/03/2016  [employer] Interactive Patient Education ©2017 [employer]  Atrial Fibrillation  Atrial fibrillation is a type of irregular or rapid heartbeat (arrhythmia). In atrial fibrillation, the heart quivers continuously in a chaotic pattern. This occurs when parts of the heart receive disorganized signals that make the heart unable to pump blood normally. This can increase the risk for stroke, heart failure, and other heart-related conditions. There are different types of atrial fibrillation, including:  · Paroxysmal atrial fibrillation. This type starts suddenly, and it usually stops on its own shortly after it starts.  · Persistent atrial fibrillation. This type often lasts longer than a week. It may stop on its own or with treatment.  · Long-lasting persistent atrial fibrillation. This type lasts longer than 12 months.  · Permanent atrial fibrillation. This type does not go away.  Talk with your health care provider to learn about the type of atrial fibrillation that you have.  CAUSES  This condition is caused by some heart-related conditions or procedures, including:  · A heart attack.  · Coronary artery disease.  · Heart failure.  · Heart valve conditions.  · High blood pressure.  · Inflammation of the sac that surrounds the heart (pericarditis).  · Heart surgery.  · Certain heart rhythm disorders, such as Munoz-Parkinson-White syndrome.  Other causes include:  · Pneumonia.  · Obstructive sleep apnea.  · Blockage of an artery in the lungs (pulmonary embolism, or PE).  · Lung cancer.  · Chronic lung disease.  · Thyroid problems, especially if the thyroid is overactive (hyperthyroidism).  · Caffeine.  · Excessive alcohol use or illegal drug use.  · Use of some medicines, including certain decongestants and diet pills.  Sometimes, the cause cannot be found.  RISK FACTORS  This condition is more likely to develop in:  · People who are older in age.  · People who smoke.  · People who  have diabetes mellitus.  · People who are overweight (obese).  · Athletes who exercise vigorously.  SYMPTOMS  Symptoms of this condition include:  · A feeling that your heart is beating rapidly or irregularly.  · A feeling of discomfort or pain in your chest.  · Shortness of breath.  · Sudden light-headedness or weakness.  · Getting tired easily during exercise.  In some cases, there are no symptoms.  DIAGNOSIS  Your health care provider may be able to detect atrial fibrillation when taking your pulse. If detected, this condition may be diagnosed with:  · An electrocardiogram (ECG).  · A Holter monitor test that records your heartbeat patterns over a 24-hour period.  · Transthoracic echocardiogram (TTE) to evaluate how blood flows through your heart.  · Transesophageal echocardiogram (LINA) to view more detailed images of your heart.  · A stress test.  · Imaging tests, such as a CT scan or chest X-ray.  · Blood tests.  TREATMENT  The main goals of treatment are to prevent blood clots from forming and to keep your heart beating at a normal rate and rhythm. The type of treatment that you receive depends on many factors, such as your underlying medical conditions and how you feel when you are experiencing atrial fibrillation.  This condition may be treated with:  · Medicine to slow down the heart rate, bring the heart's rhythm back to normal, or prevent clots from forming.  · Electrical cardioversion. This is a procedure that resets your heart's rhythm by delivering a controlled, low-energy shock to the heart through your skin.  · Different types of ablation, such as catheter ablation, catheter ablation with pacemaker, or surgical ablation. These procedures destroy the heart tissues that send abnormal signals. When the pacemaker is used, it is placed under your skin to help your heart beat in a regular rhythm.  HOME CARE INSTRUCTIONS  · Take over-the counter and prescription medicines only as told by your health care  provider.  · If your health care provider prescribed a blood-thinning medicine (anticoagulant), take it exactly as told. Taking too much blood-thinning medicine can cause bleeding. If you do not take enough blood-thinning medicine, you will not have the protection that you need against stroke and other problems.  · Do not use tobacco products, including cigarettes, chewing tobacco, and e-cigarettes. If you need help quitting, ask your health care provider.  · If you have obstructive sleep apnea, manage your condition as told by your health care provider.  · Do not drink alcohol.  · Do not drink beverages that contain caffeine, such as coffee, soda, and tea.  · Maintain a healthy weight. Do not use diet pills unless your health care provider approves. Diet pills may make heart problems worse.  · Follow diet instructions as told by your health care provider.  · Exercise regularly as told by your health care provider.  · Keep all follow-up visits as told by your health care provider. This is important.  PREVENTION  · Avoid drinking beverages that contain caffeine or alcohol.  · Avoid certain medicines, especially medicines that are used for breathing problems.  · Avoid certain herbs and herbal medicines, such as those that contain ephedra or ginseng.  · Do not use illegal drugs, such as cocaine and amphetamines.  · Do not smoke.  · Manage your high blood pressure.  SEEK MEDICAL CARE IF:  · You notice a change in the rate, rhythm, or strength of your heartbeat.  · You are taking an anticoagulant and you notice increased bruising.  · You tire more easily when you exercise or exert yourself.  SEEK IMMEDIATE MEDICAL CARE IF:  · You have chest pain, abdominal pain, sweating, or weakness.  · You feel nauseous.  · You notice blood in your vomit, bowel movement, or urine.  · You have shortness of breath.  · You suddenly have swollen feet and ankles.  · You feel dizzy.  · You have sudden weakness or numbness of the face, arm,  or leg, especially on one side of the body.  · You have trouble speaking, trouble understanding, or both (aphasia).  · Your face or your eyelid droops on one side.  These symptoms may represent a serious problem that is an emergency. Do not wait to see if the symptoms will go away. Get medical help right away. Call your local emergency services (911 in the [place] Do not drive yourself to the hospital.     This information is not intended to replace advice given to you by your health care provider. Make sure you discuss any questions you have with your health care provider.     Document Released: 12/18/2006 Document Revised: 09/07/2016 Document Reviewed: 04/13/2016  Elsevier Interactive Patient Education ©2017 [employer]vier Inc.

## 2017-08-18 ENCOUNTER — TELEPHONE (OUTPATIENT)
Dept: CARDIOLOGY | Facility: CLINIC | Age: 72
End: 2017-08-18

## 2017-08-18 RX ORDER — POTASSIUM CHLORIDE 750 MG/1
10 TABLET, FILM COATED, EXTENDED RELEASE ORAL 2 TIMES DAILY
Qty: 60 TABLET | Refills: 11 | Status: SHIPPED | OUTPATIENT
Start: 2017-08-18 | End: 2017-10-27 | Stop reason: SDUPTHER

## 2017-08-18 NOTE — TELEPHONE ENCOUNTER
----- Message from Luann Huff sent at 8/18/2017 10:48 AM EDT -----  Contact: [facility]  THE PHARMACY CALLED AND REQUESTED A REFILL ON THE PATIENTS POTASSIUM.  THEY CAN BE REACHED -[phone].  THANKS

## 2017-09-28 ENCOUNTER — TELEPHONE (OUTPATIENT)
Dept: CARDIOLOGY | Facility: CLINIC | Age: 72
End: 2017-09-28

## 2017-09-28 DIAGNOSIS — I42.9 CARDIOMYOPATHY (HCC): ICD-10-CM

## 2017-09-28 RX ORDER — FUROSEMIDE 40 MG/1
40 TABLET ORAL 2 TIMES DAILY
Qty: 60 TABLET | Refills: 5 | Status: SHIPPED | OUTPATIENT
Start: 2017-09-28 | End: 2018-04-27 | Stop reason: SDUPTHER

## 2017-09-28 NOTE — TELEPHONE ENCOUNTER
----- Message from Samantha Blevins LPN sent at 9/28/2017 10:24 AM EDT -----  Contact: [facility] [phone]   Nu is requesting a call re: medication clarification for patient      Nu states that they needed to know if his Lasix is supposed to be once or twice a day. Lasix is 40 mg by mouth twice a day. New prescription sent to patient pharmacy.

## 2017-09-28 NOTE — TELEPHONE ENCOUNTER
Left message at [phone] 09/28/17 @ 11:07 am DEBBY  Left message @ 1:56pm DEBBY      ----- Message from Samantha Blevins LPN sent at 9/28/2017 10:27 AM EDT -----  Contact: Isabela- [facility] [phone]   Isabela from [facility] requests a call back re: Lasix Dosage Clarification.

## 2017-10-27 ENCOUNTER — OFFICE VISIT (OUTPATIENT)
Dept: CARDIOLOGY | Facility: CLINIC | Age: 72
End: 2017-10-27

## 2017-10-27 VITALS
DIASTOLIC BLOOD PRESSURE: 67 MMHG | OXYGEN SATURATION: 95 % | HEART RATE: 73 BPM | HEIGHT: 69 IN | WEIGHT: 181.2 LBS | BODY MASS INDEX: 26.84 KG/M2 | SYSTOLIC BLOOD PRESSURE: 111 MMHG

## 2017-10-27 DIAGNOSIS — I10 ESSENTIAL HYPERTENSION: ICD-10-CM

## 2017-10-27 DIAGNOSIS — I48.0 PAROXYSMAL ATRIAL FIBRILLATION (HCC): ICD-10-CM

## 2017-10-27 DIAGNOSIS — I25.10 CORONARY ARTERY DISEASE INVOLVING NATIVE CORONARY ARTERY OF NATIVE HEART WITHOUT ANGINA PECTORIS: Primary | ICD-10-CM

## 2017-10-27 DIAGNOSIS — Z95.0 PACEMAKER: ICD-10-CM

## 2017-10-27 DIAGNOSIS — F17.200 SMOKING: ICD-10-CM

## 2017-10-27 DIAGNOSIS — I25.5 ISCHEMIC CARDIOMYOPATHY: ICD-10-CM

## 2017-10-27 DIAGNOSIS — I25.5 ISCHEMIC CARDIOMYOPATHY: Primary | ICD-10-CM

## 2017-10-27 DIAGNOSIS — I25.119 CORONARY ARTERY DISEASE INVOLVING NATIVE CORONARY ARTERY OF NATIVE HEART WITH ANGINA PECTORIS (HCC): ICD-10-CM

## 2017-10-27 PROCEDURE — 99214 OFFICE O/P EST MOD 30 MIN: CPT | Performed by: NURSE PRACTITIONER

## 2017-10-27 PROCEDURE — 93288 INTERROG EVL PM/LDLS PM IP: CPT | Performed by: INTERNAL MEDICINE

## 2017-10-27 RX ORDER — POTASSIUM CHLORIDE 750 MG/1
TABLET, FILM COATED, EXTENDED RELEASE ORAL
Qty: 180 TABLET | Refills: 5 | Status: SHIPPED | OUTPATIENT
Start: 2017-10-27 | End: 2018-10-26 | Stop reason: SDUPTHER

## 2017-10-27 RX ORDER — CARVEDILOL 6.25 MG/1
6.25 TABLET ORAL 2 TIMES DAILY
Qty: 180 TABLET | Refills: 3 | Status: SHIPPED | OUTPATIENT
Start: 2017-10-27 | End: 2018-04-16 | Stop reason: SDUPTHER

## 2017-10-27 RX ORDER — CLOPIDOGREL BISULFATE 75 MG/1
75 TABLET ORAL DAILY
Qty: 30 TABLET | Refills: 11 | Status: SHIPPED | OUTPATIENT
Start: 2017-10-27 | End: 2018-04-27 | Stop reason: SDUPTHER

## 2017-10-27 RX ORDER — LISINOPRIL 10 MG/1
10 TABLET ORAL 2 TIMES DAILY
Qty: 60 TABLET | Refills: 5 | Status: SHIPPED | OUTPATIENT
Start: 2017-10-27 | End: 2018-04-27 | Stop reason: SDUPTHER

## 2017-10-27 RX ORDER — AMLODIPINE BESYLATE 2.5 MG/1
5 TABLET ORAL DAILY
Qty: 60 TABLET | Refills: 5 | Status: SHIPPED | OUTPATIENT
Start: 2017-10-27 | End: 2018-04-27 | Stop reason: SDUPTHER

## 2017-10-27 NOTE — PROGRESS NOTES
Chito Nuñez is a 72 y.o. male     Chief Complaint   Patient presents with   • Follow-up     patient appears in office today for follow up ; denies CP   • Coronary Artery Disease     patient has H/O CAD without angina pectoris   • Atrial Fibrillation     patient has H/O atrial fibrillation        HPI    Problem List:    1. CAD  1.1 [facility] 2012 - LT Main 10-20%; LAD 40-50%; Circ 100%; RCA 20-30% RCA and 30-40%; medical management   1.2 Stress Test 1/11/17 - Anterior ischemia; cannot rule out inferior as well; decreased LVEF; intermediate risk   1.3 [facility] 1/30/17 - Stent to RCA; EF 20-25%  2. Hypertension  3. Atrial Flutter Paroxysmal CHADS 2  3.1 No anticoagulation due to GIB   3.2 Event Monitor 12/15-12/28/16 - Atrial Fib, atrial flutter, Pacemaker tachycardia   4. 2:1 AVB with presyncope/Bradycardia symptomatic   4.1 Dual chamber PPM Guidant 10/8/14  5. Dyslipidemia   6. Shortness of breath  6.1 Echo 3/11/14 - mod LVH; EF 40-50%; mild left ventricular hypokinesis; mild MR; PA 36  6.2 Echo 1/11/17 - mild LVH; EF 35-40%; DD II; mild MR and TR; PA 35-40  7. Smoking Habituation - Quit 1/17/2017  8. Cardiomyopathy   8.1 MUGA 3/22/17 - EF 40%    Patient is a 72-year-old male who presents today for follow-up.  He denies any chest pain, pressure, palpitations, fluttering, dizziness, presyncope, syncope, orthopnea, PND or edema.  He denies any shortness of breath with activity.  He says he isn't very well he says he doesn't know if the pacemaker just took the last 2 weeks he's had a lot of energy and felt really good.  He did have a EGD and colonoscopy.  He says that he has a larger polyp in his stomach but they do not want to remove at this time due to the Plavix and aspirin.  Advised he will be up to come off Plavix at the end edema beginning of February and that we can have that done if he needs to.  He is back to smoking one pack a day.  He had no episodes of A. fib on his device checked today.    Current  Outpatient Prescriptions   Medication Sig Dispense Refill   • albuterol (PROAIR HFA) 108 (90 BASE) MCG/ACT inhaler ProAir  (90 Base) MCG/ACT Inhalation Aerosol Solution; Patient Sig: ProAir  (90 Base) MCG/ACT Inhalation Aerosol Solution INHALE 1 TO 2 PUFFS EVERY 4 TO 6 HOURS AS NEEDED.; 0; 23-Jul-2013; Active     • amiodarone (PACERONE) 200 MG tablet Take 1 tablet by mouth Daily. 30 tablet 5   • AMITIZA 24 MCG capsule Take 24 mcg by mouth Daily As Needed.     • amLODIPine (NORVASC) 2.5 MG tablet Take 2 tablets by mouth Daily. 60 tablet 5   • aspirin 81 MG tablet Take 81 mg by mouth Daily.     • carvedilol (COREG) 6.25 MG tablet Take 1 tablet by mouth 2 (Two) Times a Day. 180 tablet 3   • clonazePAM (KLONOPIN) 1 MG tablet Take 1 mg by mouth 3 (Three) Times a Day As Needed.     • clopidogrel (PLAVIX) 75 MG tablet Take 1 tablet by mouth Daily. 30 tablet 11   • COMBIVENT RESPIMAT  MCG/ACT inhaler Inhale 1 puff Daily.     • furosemide (LASIX) 40 MG tablet Take 1 tablet by mouth 2 (Two) Times a Day. 60 tablet 5   • gabapentin (NEURONTIN) 800 MG tablet Take 800 mg by mouth 3 (Three) Times a Day As Needed.     • ipratropium-albuterol (DUO-NEB) 0.5-2.5 mg/mL nebulizer Ipratropium-Albuterol 0.5-2.5 (3) MG/3ML Inhalation Solution; Patient Sig: Ipratropium-Albuterol 0.5-2.5 (3) MG/3ML Inhalation Solution USE 1 UNIT DOSE IN NEBULIZER EVERY 4 HOURS AS NEEDED.; 0; 23-Jul-2013; Active     • lansoprazole (PREVACID) 30 MG capsule Take 30 mg by mouth Daily.     • lisinopril (PRINIVIL,ZESTRIL) 10 MG tablet Take 1 tablet by mouth 2 (Two) Times a Day. 60 tablet 5   • methocarbamol (ROBAXIN) 750 MG tablet Take 1 tablet by mouth 4 (Four) Times a Day As Needed.     • nicotine (NICODERM CQ) 21 MG/24HR patch Place 1 patch on the skin Daily. 28 patch 3   • oxyCODONE (ROXICODONE) 30 MG immediate release tablet Take 1 tablet by mouth 4 (Four) Times a Day.     • polyethylene glycol (MIRALAX) powder Take  by mouth Daily.     •  potassium chloride (K-DUR) 10 MEQ CR tablet Take 20 Meq daily and then take additional 20 meq with additional lasix 180 tablet 5   • simvastatin (ZOCOR) 10 MG tablet Take 10 mg by mouth Every Evening.     • SYMBICORT 160-4.5 MCG/ACT inhaler Inhale 2 puffs Take As Directed.     • tamsulosin (FLOMAX) 0.4 MG capsule 24 hr capsule Take 1 capsule by mouth Daily.     • varenicline (CHANTIX MACK) 0.5 MG X 11 & 1 MG X 42 tablet Take 0.5 mg one daily on days 1-2 and 0.5 mg twice daily on days 4-7. Then 1 mg twice daily for a total of 12 weeks. 53 tablet 2     No current facility-administered medications for this visit.        ALLERGIES    Review of patient's allergies indicates no known allergies.    Past Medical History:   Diagnosis Date   • 2nd degree atrioventricular block 9/19/2016   • Acute renal insufficiency    • Anxiety    • Arthritis    • Asthma    • Atrial fibrillation 9/19/2016   • Benign prostatic hypertrophy with urinary obstruction 9/19/2016   • Bradycardia 9/19/2016   • CAD (coronary artery disease), native coronary artery 9/19/2016   • Chest pain 9/19/2016   • Congestive heart failure 9/19/2016   • COPD (chronic obstructive pulmonary disease)    • Degeneration of cervical intervertebral disc    • Depression    • Dyslipidemia 9/19/2016   • Hiatal hernia    • Hyperlipidemia    • Hypertension    • Hypogonadism male 9/19/2016   • Incomplete emptying of bladder 9/19/2016   • Male erectile disorder of organic origin 9/19/2016   • Myocardial infarction    • Sleep apnea    • Thrombocytopenia 9/19/2016   • Urinary hesitancy 9/19/2016       Social History     Social History   • Marital status: Single     Spouse name: N/A   • Number of children: N/A   • Years of education: N/A     Occupational History   • Not on file.     Social History Main Topics   • Smoking status: Current Every Day Smoker     Packs/day: 0.50     Types: Cigarettes   • Smokeless tobacco: Never Used   • Alcohol use No   • Drug use: No   • Sexual  "activity: Defer     Other Topics Concern   • Not on file     Social History Narrative       Family History   Problem Relation Age of Onset   • Diabetes Son    • No Known Problems Mother    • No Known Problems Father        Review of Systems   Constitutional: Negative for diaphoresis and fatigue.   HENT: Positive for congestion (head congestion ). Negative for rhinorrhea, sinus pain, sinus pressure, sneezing and sore throat.    Eyes: Positive for visual disturbance (wears glasses daily).   Respiratory: Negative for cough, chest tightness, shortness of breath (denies SOA) and wheezing.    Cardiovascular: Negative for chest pain (denies CP), palpitations (denies palpitations) and leg swelling.   Gastrointestinal: Negative for abdominal distention, abdominal pain, constipation, diarrhea, nausea and vomiting.   Endocrine: Negative for cold intolerance, heat intolerance, polyphagia and polyuria.   Genitourinary: Negative for difficulty urinating, frequency and urgency.   Musculoskeletal: Positive for arthralgias (knees/shoulders/back) and back pain (due to arthritis). Negative for myalgias, neck pain and neck stiffness.   Skin: Negative.  Negative for rash and wound.   Allergic/Immunologic: Positive for environmental allergies (seasonal allergies). Negative for food allergies.   Neurological: Negative.  Negative for dizziness, weakness, light-headedness and headaches.   Hematological: Does not bruise/bleed easily.   Psychiatric/Behavioral: Negative for agitation, confusion and sleep disturbance (denies waking up smothering/SOA). The patient is not nervous/anxious.        Objective   /67 (BP Location: Left arm, Patient Position: Sitting)  Pulse 73  Ht 69\" (175.3 cm)  Wt 181 lb 3.2 oz (82.2 kg)  SpO2 95%  BMI 26.76 kg/m2  Vitals:    10/27/17 1019   BP: 111/67   BP Location: Left arm   Patient Position: Sitting   Pulse: 73   SpO2: 95%   Weight: 181 lb 3.2 oz (82.2 kg)   Height: 69\" (175.3 cm)      Lab Results " (most recent)     None        Physical Exam   Constitutional: He is oriented to person, place, and time. Vital signs are normal. He appears well-developed and well-nourished. He is active and cooperative.   HENT:   Head: Normocephalic.   Mouth/Throat: Abnormal dentition (edentulous ).   Eyes: Lids are normal.   Wears glasses    Neck: Normal carotid pulses, no hepatojugular reflux and no JVD present. Carotid bruit is not present.   Cardiovascular: Normal rate, regular rhythm and normal heart sounds.    Pulses:       Radial pulses are 2+ on the right side, and 2+ on the left side.        Dorsalis pedis pulses are 2+ on the right side, and 2+ on the left side.        Posterior tibial pulses are 2+ on the right side, and 2+ on the left side.   No edema BLE.    Pulmonary/Chest: Effort normal and breath sounds normal.   Abdominal: Normal appearance and bowel sounds are normal.   Neurological: He is alert and oriented to person, place, and time.   Skin: Skin is warm, dry and intact.   PPM STEPH    Psychiatric: He has a normal mood and affect. His speech is normal and behavior is normal. Judgment and thought content normal. Cognition and memory are normal.       Procedure   Procedures         Assessment/Plan      Diagnosis Plan   1. Coronary artery disease involving native coronary artery of native heart without angina pectoris     2. Essential hypertension  lisinopril (PRINIVIL,ZESTRIL) 10 MG tablet    amLODIPine (NORVASC) 2.5 MG tablet   3. Ischemic cardiomyopathy  potassium chloride (K-DUR) 10 MEQ CR tablet   4. Paroxysmal atrial fibrillation     5. Pacemaker     6. Coronary artery disease involving native coronary artery of native heart with angina pectoris  clopidogrel (PLAVIX) 75 MG tablet    carvedilol (COREG) 6.25 MG tablet   7. Smoking  varenicline (CHANTIX MACK) 0.5 MG X 11 & 1 MG X 42 tablet       Return in about 4 months (around 3/5/2018).  CAD-patient is on aspirin, Plavix, beta, Ace and statin.   Hypertension-patient doing very well.  Ischemic cardiomyopathy-patient is on Ace, RV and diuretic.  A. fib-patient is on amnio.  He is not on anticoagulation due to history of GI bleed and we are waiting for GI clearance.  Her pacemaker-patient doing well.  Smoking-patient encouraged on smoking cessation and I did send in Chantix for him.  He will continue his medication regimen.  He will follow-up in 4 months or sooner if any changes.

## 2017-10-27 NOTE — PATIENT INSTRUCTIONS
"You Can Quit Smoking  If you are ready to quit smoking or are thinking about it, congratulations! You have chosen to help yourself be healthier and live longer! There are lots of different ways to quit smoking. Nicotine gum, nicotine patches, a nicotine inhaler, or nicotine nasal spray can help with physical craving. Hypnosis, support groups, and medicines help break the habit of smoking.  TIPS TO GET OFF AND STAY OFF CIGARETTES  · Learn to predict your moods. Do not let a bad situation be your excuse to have a cigarette. Some situations in your life might tempt you to have a cigarette.  · Ask friends and co-workers not to smoke around you.  · Make your home smoke-free.  · Never have \"just one\" cigarette. It leads to wanting another and another. Remind yourself of your decision to quit.  · On a card, make a list of your reasons for not smoking. Read it at least the same number of times a day as you have a cigarette. Tell yourself everyday, \"I do not want to smoke. I choose not to smoke.\"  · Ask someone at home or work to help you with your plan to quit smoking.  · Have something planned after you eat or have a cup of coffee. Take a walk or get other exercise to perk you up. This will help to keep you from overeating.  · Try a relaxation exercise to calm you down and decrease your stress. Remember, you may be tense and nervous the first two weeks after you quit. This will pass.  · Find new activities to keep your hands busy. Play with a pen, coin, or rubber band. Doodle or draw things on paper.  · Brush your teeth right after eating. This will help cut down the craving for the taste of tobacco after meals. You can try mouthwash too.  · Try gum, breath mints, or diet candy to keep something in your mouth.  IF YOU SMOKE AND WANT TO QUIT:  · Do not stock up on cigarettes. Never buy a carton. Wait until one pack is finished before you buy another.  · Never carry cigarettes with you at work or at home.  · Keep cigarettes " "as far away from you as possible. Leave them with someone else.  · Never carry matches or a lighter with you.  · Ask yourself, \"Do I need this cigarette or is this just a reflex?\"  · Bet with someone that you can quit. Put cigarette money in a [employer] bank every morning. If you smoke, you give up the money. If you do not smoke, by the end of the week, you keep the money.  · Keep trying. It takes 21 days to change a habit!  · Talk to your doctor about using medicines to help you quit. These include nicotine replacement gum, lozenges, or skin patches.     This information is not intended to replace advice given to you by your health care provider. Make sure you discuss any questions you have with your health care provider.     Document Released: 10/14/2010 Document Revised: 03/11/2013 Document Reviewed: 05/03/2016  [employer] Interactive Patient Education ©2017 [employer]  Coronary Artery Disease, Male  Coronary artery disease (CAD) is a process in which the blood vessels of the heart (coronary arteries) become narrow or blocked. The narrowing or blockage can lead to decreased blood flow to the heart muscle (angina). Prolonged reduced blood flow can cause a heart attack (myocardial infarction, MI). Because CAD is the leading cause of death in men, it is important to understand what causes this condition and how it is treated.  CAUSES  Atherosclerosis is the cause of CAD. This is the buildup of fat and cholesterol (plaque) on the inside of the arteries. Over time, the plaque may narrow or block the artery, and this will lessen blood flow to the heart. Plaque can also become weak and break off within a coronary artery to form a clot and cause a sudden blockage.  RISK FACTORS  Many risk factors increase your chances of getting CAD, including:  · High cholesterol levels.  · High blood pressure (hypertension).  · Tobacco use.  · Diabetes.  · Age. Men over age 45 are at a greater risk of CAD.  · Gender. Men often develop CAD " earlier in life than women.  · Family history of CAD.  · Obesity.  · Lack of exercise.  · A diet high in saturated fats.  SYMPTOMS   Many people do not experience any symptoms during the early stages of CAD. As the condition progresses, symptoms may include:   · Chest pain.  ¨ The pain can be described as a crushing or squeezing in the chest, or a tightness, pressure, fullness, or heaviness in the chest.  ¨ The pain can last more than a few minutes or can stop and recur.   · Pain in the arms, neck, jaw, or back.  · Unexplained heartburn or indigestion.  · Shortness of breath.  · Nausea.  · Sudden cold sweats.   Less common symptoms of CAD in men can include:  · Fatigue.  · Unexplained feelings of nervousness or anxiety.  · Weakness.  · Diarrhea.  · Sudden light-headedness.  DIAGNOSIS   Tests to diagnose CAD may include:  · ECG (electrocardiogram).  · Exercise stress test. This looks for signs of blockage when the heart is being exercised.  · Pharmacologic stress test. This test looks for signs of blockage when the heart is being stressed with a medicine.  · Blood tests.  · Coronary angiogram. This is a procedure to look at the coronary arteries to see if there is any blockage.  TREATMENT  The treatment of CAD may include the following:  · Healthy behavioral changes to reduce or control risk factors.  · Medicine.  · Coronary stenting. A stent helps to keep an artery open.  · Coronary angioplasty. This procedure widens a narrowed or blocked artery.  · Coronary artery bypass surgery. This will allow your blood to pass the blockage (bypass) to reach your heart.  HOME CARE INSTRUCTIONS  · Take medicines only as directed by your health care provider.  · Do not take the following medicines unless your health care provider approves:    Nonsteroidal anti-inflammatory drugs (NSAIDs), such as ibuprofen, naproxen, or celecoxib.    Vitamin supplements that contain vitamin A, vitamin E, or both.  · Manage other health conditions  such as hypertension and diabetes as directed by your health care provider.  · Follow a heart-healthy diet. A dietitian can help to educate you about healthy food options and changes.  · Use healthy cooking methods such as roasting, grilling, broiling, baking, poaching, steaming, or stir-frying. Talk to a dietitian to learn more about healthy cooking methods.  · Follow an exercise program approved by your health care provider.  · Maintain a healthy weight. Lose weight as approved by your health care provider.  · Plan rest periods when fatigued.  · Learn to manage stress.  · Do not use any tobacco products, including cigarettes, chewing tobacco, or electronic cigarettes. If you need help quitting, ask your health care provider.  · If you drink alcohol, and your health care provider approves, limit your alcohol intake to no more than 1 drink per day. One drink equals 12 ounces of beer, 5 ounces of wine, or 1½ ounces of hard liquor.  · Stop illegal drug use.  · Your health care provider may ask you to monitor your blood pressure. A blood pressure reading consists of a higher number over a lower number, such as 110 over 72, which is written as 110/72. Ideally, your blood pressure should be:    Below 140/90 if you have no other medical conditions.    Below 130/80 if you have diabetes or kidney disease.  · Keep all follow-up visits as directed by your health care provider. This is important.  SEEK IMMEDIATE MEDICAL CARE IF:  · You have pain in your chest, neck, arm, jaw, stomach, or back that lasts more than a few minutes, is recurring, or is unrelieved by taking medicine under your tongue (sublingual nitroglycerin).  · You have profuse sweating without cause.  · You have unexplained:    Heartburn or indigestion.    Shortness of breath or difficulty breathing.    Nausea or vomiting.    Fatigue.    Feelings of nervousness or anxiety.    Weakness.    Diarrhea.  · You have sudden light-headedness or dizziness.  · You  faint.  These symptoms may represent a serious problem that is an emergency. Do not wait to see if the symptoms will go away. Get medical help right away. Call your local emergency services (911 in the [place] Do not drive yourself to the hospital.     This information is not intended to replace advice given to you by your health care provider. Make sure you discuss any questions you have with your health care provider.     Document Released: 07/15/2015 Document Reviewed: 07/15/2015  [employer] Interactive Patient Education ©2017 [employer].

## 2018-04-16 DIAGNOSIS — I25.119 CORONARY ARTERY DISEASE INVOLVING NATIVE CORONARY ARTERY OF NATIVE HEART WITH ANGINA PECTORIS (HCC): ICD-10-CM

## 2018-04-16 RX ORDER — CARVEDILOL 6.25 MG/1
6.25 TABLET ORAL 2 TIMES DAILY WITH MEALS
Qty: 180 TABLET | Refills: 3 | Status: SHIPPED | OUTPATIENT
Start: 2018-04-16 | End: 2018-04-27 | Stop reason: SDUPTHER

## 2018-04-27 ENCOUNTER — OFFICE VISIT (OUTPATIENT)
Dept: CARDIOLOGY | Facility: CLINIC | Age: 73
End: 2018-04-27

## 2018-04-27 VITALS
DIASTOLIC BLOOD PRESSURE: 90 MMHG | HEIGHT: 69 IN | BODY MASS INDEX: 27.11 KG/M2 | WEIGHT: 183 LBS | SYSTOLIC BLOOD PRESSURE: 145 MMHG | OXYGEN SATURATION: 96 % | HEART RATE: 70 BPM

## 2018-04-27 DIAGNOSIS — I42.9 CARDIOMYOPATHY, UNSPECIFIED TYPE (HCC): ICD-10-CM

## 2018-04-27 DIAGNOSIS — R06.02 SHORTNESS OF BREATH: ICD-10-CM

## 2018-04-27 DIAGNOSIS — E78.5 DYSLIPIDEMIA: ICD-10-CM

## 2018-04-27 DIAGNOSIS — I25.119 CORONARY ARTERY DISEASE INVOLVING NATIVE CORONARY ARTERY OF NATIVE HEART WITH ANGINA PECTORIS (HCC): ICD-10-CM

## 2018-04-27 DIAGNOSIS — I10 ESSENTIAL HYPERTENSION: ICD-10-CM

## 2018-04-27 DIAGNOSIS — I25.5 ISCHEMIC CARDIOMYOPATHY: Primary | ICD-10-CM

## 2018-04-27 DIAGNOSIS — I44.1 2ND DEGREE ATRIOVENTRICULAR BLOCK: ICD-10-CM

## 2018-04-27 DIAGNOSIS — F17.200 SMOKING: ICD-10-CM

## 2018-04-27 DIAGNOSIS — Z00.00 HEALTHCARE MAINTENANCE: ICD-10-CM

## 2018-04-27 DIAGNOSIS — I48.0 PAROXYSMAL ATRIAL FIBRILLATION (HCC): ICD-10-CM

## 2018-04-27 DIAGNOSIS — R53.81 MALAISE AND FATIGUE: ICD-10-CM

## 2018-04-27 DIAGNOSIS — I25.10 CORONARY ARTERY DISEASE INVOLVING NATIVE CORONARY ARTERY OF NATIVE HEART WITHOUT ANGINA PECTORIS: ICD-10-CM

## 2018-04-27 DIAGNOSIS — R53.83 MALAISE AND FATIGUE: ICD-10-CM

## 2018-04-27 DIAGNOSIS — R00.1 BRADYCARDIA: ICD-10-CM

## 2018-04-27 PROCEDURE — 99214 OFFICE O/P EST MOD 30 MIN: CPT | Performed by: NURSE PRACTITIONER

## 2018-04-27 PROCEDURE — 93288 INTERROG EVL PM/LDLS PM IP: CPT | Performed by: INTERNAL MEDICINE

## 2018-04-27 PROCEDURE — 93000 ELECTROCARDIOGRAM COMPLETE: CPT | Performed by: NURSE PRACTITIONER

## 2018-04-27 RX ORDER — FUROSEMIDE 40 MG/1
40 TABLET ORAL 2 TIMES DAILY
Qty: 180 TABLET | Refills: 3 | Status: SHIPPED | OUTPATIENT
Start: 2018-04-27 | End: 2019-03-22 | Stop reason: SDUPTHER

## 2018-04-27 RX ORDER — CLOPIDOGREL BISULFATE 75 MG/1
75 TABLET ORAL DAILY
Qty: 90 TABLET | Refills: 3 | Status: SHIPPED | OUTPATIENT
Start: 2018-04-27 | End: 2018-10-26 | Stop reason: SDUPTHER

## 2018-04-27 RX ORDER — AMIODARONE HYDROCHLORIDE 200 MG/1
200 TABLET ORAL DAILY
Qty: 90 TABLET | Refills: 3 | Status: SHIPPED | OUTPATIENT
Start: 2018-04-27 | End: 2018-10-26 | Stop reason: SDUPTHER

## 2018-04-27 RX ORDER — AMLODIPINE BESYLATE 2.5 MG/1
5 TABLET ORAL DAILY
Qty: 180 TABLET | Refills: 3 | Status: SHIPPED | OUTPATIENT
Start: 2018-04-27 | End: 2018-07-16

## 2018-04-27 RX ORDER — CARVEDILOL 6.25 MG/1
6.25 TABLET ORAL 2 TIMES DAILY WITH MEALS
Qty: 180 TABLET | Refills: 3 | Status: SHIPPED | OUTPATIENT
Start: 2018-04-27 | End: 2018-10-26 | Stop reason: SDUPTHER

## 2018-04-27 RX ORDER — SIMVASTATIN 10 MG
10 TABLET ORAL EVERY EVENING
Qty: 90 TABLET | Refills: 3 | Status: SHIPPED | OUTPATIENT
Start: 2018-04-27 | End: 2019-01-07 | Stop reason: SDUPTHER

## 2018-04-27 RX ORDER — LISINOPRIL 10 MG/1
10 TABLET ORAL DAILY
Qty: 90 TABLET | Refills: 3 | Status: SHIPPED | OUTPATIENT
Start: 2018-04-27 | End: 2019-01-07 | Stop reason: SDUPTHER

## 2018-04-27 NOTE — PATIENT INSTRUCTIONS
Steps to Quit Smoking  Smoking tobacco can be bad for your health. It can also affect almost every organ in your body. Smoking puts you and people around you at risk for many serious long-lasting (chronic) diseases. Quitting smoking is hard, but it is one of the best things that you can do for your health. It is never too late to quit.  What are the benefits of quitting smoking?  When you quit smoking, you lower your risk for getting serious diseases and conditions. They can include:  · Lung cancer or lung disease.  · Heart disease.  · Stroke.  · Heart attack.  · Not being able to have children (infertility).  · Weak bones (osteoporosis) and broken bones (fractures).  If you have coughing, wheezing, and shortness of breath, those symptoms may get better when you quit. You may also get sick less often. If you are pregnant, quitting smoking can help to lower your chances of having a baby of low birth weight.  What can I do to help me quit smoking?  Talk with your doctor about what can help you quit smoking. Some things you can do (strategies) include:  · Quitting smoking totally, instead of slowly cutting back how much you smoke over a period of time.  · Going to in-person counseling. You are more likely to quit if you go to many counseling sessions.  · Using resources and support systems, such as:  ¨ Online chats with a counselor.  ¨ Phone quitlines.  ¨ Printed self-help materials.  ¨ Support groups or group counseling.  ¨ Text messaging programs.  ¨ Mobile phone apps or applications.  · Taking medicines. Some of these medicines may have nicotine in them. If you are pregnant or breastfeeding, do not take any medicines to quit smoking unless your doctor says it is okay. Talk with your doctor about counseling or other things that can help you.  Talk with your doctor about using more than one strategy at the same time, such as taking medicines while you are also going to in-person counseling. This can help make quitting  easier.  What things can I do to make it easier to quit?  Quitting smoking might feel very hard at first, but there is a lot that you can do to make it easier. Take these steps:  · Talk to your family and friends. Ask them to support and encourage you.  · Call phone quitlines, reach out to support groups, or work with a counselor.  · Ask people who smoke to not smoke around you.  · Avoid places that make you want (trigger) to smoke, such as:  ¨ Bars.  ¨ Parties.  ¨ Smoke-break areas at work.  · Spend time with people who do not smoke.  · Lower the stress in your life. Stress can make you want to smoke. Try these things to help your stress:  ¨ Getting regular exercise.  ¨ Deep-breathing exercises.  ¨ Yoga.  ¨ Meditating.  ¨ Doing a body scan. To do this, close your eyes, focus on one area of your body at a time from head to toe, and notice which parts of your body are tense. Try to relax the muscles in those areas.  · Download or buy apps on your mobile phone or tablet that can help you stick to your quit plan. There are many free apps, such as QuitGuide from the CDC (Centers for Disease Control and Prevention). You can find more support from smokefree.gov and other websites.  This information is not intended to replace advice given to you by your health care provider. Make sure you discuss any questions you have with your health care provider.  Document Released: 10/14/2010 Document Revised: 08/15/2017 Document Reviewed: 05/03/2016  [employer] Interactive Patient Education © 2017 [employer]    Cardiomyopathy, Adult  Cardiomyopathy is a long-term (chronic) disease of the heart muscle. The disease makes the heart muscle thick, weak, or stiff. As a result, the heart works harder to pump blood. Over time, cardiomyopathy can lead to heart failure.  There are several types of cardiomyopathy:  · Dilated cardiomyopathy. This type causes the ventricles to become weak and stretched out.  · Hypertrophic cardiomyopathy. This  type causes the heart muscle to thicken.  · Restrictive cardiomyopathy. This type causes the heart muscle to become stiff.  · Ischemic cardiomyopathy. This type involves narrowing arteries that cause the walls of the heart to get thinner.  · Peripartum cardiomyopathy. This type occurs during pregnancy or shortly after pregnancy.  What are the causes?  This condition may be caused by:  · A gene that is passed down (inherited) from a family member.  · A medical condition that damages the heart, such as:  ¨ Diabetes.  ¨ High blood pressure.  ¨ Viral infection of the heart.  ¨ Heart attack.  ¨ Coronary heart disease.  · Alcoholism.  · Using illegal drugs or some prescription medicines.  · Pregnancy.  · Your body absorbing and storing too much iron (hemochromatosis).  · Autoimmune diseases, connective tissue diseases, endocrine diseases, and muscle diseases.  · Cancer treatments.  · Buildup of proteins in your organs (amyloidosis), or inflammation in your organs (sarcoidosis).  Often, the cause is not known.  What increases the risk?  This condition is more likely to develop in people who:  · Have a family history of cardiomyopathy or other heart problems.  · Are overweight or obese.  · Use illegal drugs.  · Abuse alcohol.  · Have a medical condition that damages the heart.  What are the signs or symptoms?  Symptoms of this condition include:  · Shortness of breath, especially during activity.  · Fatigue.  · An irregular heartbeat and heart murmurs.  · Dizziness.  · Light-headedness.  · Fainting.  · Chest pain.  · Coughing.  · Swelling in the lower legs, ankles, feet, abdomen, and neck veins.  Often, people with this condition have no symptoms.  How is this diagnosed?  This condition is diagnosed based on:  · Your symptoms and medical history.  · A physical exam.  · Tests.  Tests may include:  · Blood tests.  · Imaging studies of your heart, such as:  ¨ X-rays.  ¨ An echocardiogram.  ¨ An MRI.  · An electrocardiogram  (ECG). This records your heart's electrical activity.  · A test in which you wear a portable device (event monitor) to record your heart's electrical activity while you go about your day.  · A stress test. This monitors your heart's activity while exercising.  · Cardiac catheterization. This procedure checks the blood pressure and blood flow in your heart.  · An angiogram. This is an injection of dye into your arteries before imaging studies are taken.  · Heart tissue biopsy. This removes a sample of heart tissue for examination.  How is this treated?  Treatment for this condition depends on the type of cardiomyopathy you have and the severity of your symptoms. If you do not have symptoms, you may not need treatment. If you need treatment, it may include:  · Lifestyle changes, such as:  ¨ Eating a heart-healthy diet that includes plenty of fruits, vegetables, and whole grains, and cutting down on salt (sodium).  ¨ Maintaining a healthy weight, and losing weight, if needed.  ¨ Getting regular exercise.  ¨ Quitting smoking, if you smoke.  ¨ Avoiding alcohol.  · Medicine to:  ¨ Lower your blood pressure.  ¨ Slow down your heart rate.  ¨ Keep your heart beating in a steady rhythm.  ¨ Clear excess fluids from your body.  ¨ Prevent blood clots.  ¨ Balance minerals (electrolytes) in your body and get rid of extra sodium in your body.  ¨ Reduce inflammation.  ¨ Strengthen your heartbeat.  · Surgery to:  ¨ Repair a defect.  ¨ Remove thickened tissue.  ¨ Destroy tissues in the area of abnormal electrical activity (ablation).  ¨ Implant a device to treat serious heart rhythm problems (implantable cardioverter-defibrillator, or ICD), or a pacemaker.  ¨ Replace your heart (heart transplant) if all other treatments have failed (end stage).  Other treatments may include cardiac resynchronization therapy (CRT) or a left ventricular assist device (LVAD).  Follow these instructions at home:  Lifestyle   · Eat a heart-healthy diet.  Work with your health care provider or a registered dietitian to learn about healthy eating options.  · Maintain a healthy weight.  · Stay physically active. Ask your health care provider to suggest some activities that are good for you.  · Do not use any products that contain nicotine or tobacco, such as cigarettes and e-cigarettes. If you need help quitting, ask your health care provider.  · Limit alcohol intake to no more than one drink per day for nonpregnant women and no more than two drinks per day for men. One drink equals 12 oz of beer, 5 oz of wine, or 1½ oz of hard liquor.  · Try to get at least 7 hours of sleep each night.  · Find healthy ways to manage stress.  General instructions   · Take over-the-counter and prescription medicines only as told by your health care provider. Some medicines can be dangerous for your heart.  · Tell all health care providers, including your dentist, that you have cardiomyopathy. When you visit the dentist or have surgery, ask your health care provider if you need antibiotics before having dental care or before the surgery.  · Ask your health care provider if you should wear a medical identification bracelet. This may be important if you have a pacemaker or a defibrillator.  · Make sure you get all recommended vaccinations and an annual flu shot.  · Work closely with your health care provider to manage any long-lasting (chronic) conditions.  · Keep all follow-up visits as told by your health care provider. This is important, even if you do not have any symptoms. Your health care provider may need to make sure your condition is not getting worse.  How is this prevented?  This condition cannot be prevented. Parents, siblings, and children of people with this condition may be at risk for the condition. It is a good idea for them to get screened for the condition because it is best when cardiomyopathy is found early. Screening is done with an ECG and echocardiogram.  People who  want to start a family may also want to meet with a genetic counselor to discuss the risk of having a child with cardiomyopathy.  Contact a health care provider if:  · Your symptoms get worse.  · You have new symptoms.  Get help right away if:  · You have severe chest pain.  · You have shortness of breath.  · You cough up pink, bubbly material.  · You have sudden sweating.  · You feel nauseous and you vomit.  · You suddenly become light-headed or dizzy.  · You feel your heart beating very quickly.  · It feels like your heart is skipping beats.  These symptoms may represent a serious problem that is an emergency. Do not wait to see if the symptoms will go away. Get medical help right away. Call your local emergency services (911 in the [place] Do not drive yourself to the hospital.  This information is not intended to replace advice given to you by your health care provider. Make sure you discuss any questions you have with your health care provider.  Document Released: 03/02/2006 Document Revised: 08/15/2017 Document Reviewed: 06/19/2017  [employer] Interactive Patient Education © 2017 [employer]

## 2018-04-27 NOTE — PROGRESS NOTES
Subjective   Kandice Nuñez is a 72 y.o. male     Chief Complaint   Patient presents with   • Follow-up     4 month    • Coronary Artery Disease     denies chest pain    • Shortness of Breath     with over exertion        HPI    Problem List:    1. CAD  1.1 [facility] 2012 - LT Main 10-20%; LAD 40-50%; Circ 100%; RCA 20-30% RCA and 30-40%; medical management   1.2 Stress Test 1/11/17 - Anterior ischemia; cannot rule out inferior as well; decreased LVEF; intermediate risk   1.3 [facility] 1/30/17 - Stent to RCA; EF 20-25%  2. Hypertension  3. Atrial Flutter Paroxysmal CHADS 2  3.1 No anticoagulation due to GIB   3.2 Event Monitor 12/15-12/28/16 - Atrial Fib, atrial flutter, Pacemaker tachycardia   4. 2:1 AVB with presyncope/Bradycardia symptomatic   4.1 Dual chamber PPM Guidant 10/8/14  5. Dyslipidemia   6. Shortness of breath  6.1 Echo 3/11/14 - mod LVH; EF 40-50%; mild left ventricular hypokinesis; mild MR; PA 36  6.2 Echo 1/11/17 - mild LVH; EF 35-40%; DD II; mild MR and TR; PA 35-40  7. Smoking Habituation   8. Cardiomyopathy   8.1 [facility] 3/22/17 - EF 40%    Patient is a 72-year-old male who presents today for follow-up with his significant other at his side.  He denies any chest pain, pressure, palpitations, fluttering, presyncope, syncope, orthopnea or PND.  He says he will get dizzy/lightheaded whenever his blood pressure drops.  He says that he does get swelling in his legs but is doing much better.  He says he can't do much without getting short of breath or running out of gas activity wise.  Patient is still smoking one pack a day.  He says that he did get a gastroenterologist and they did do a scope and they have one other area that needs to be addressed however they cannot do in 20 comes off the Plavix.  Which he is actually able to come off Plavix now due to the fact that it has been over years since his stent was placed.    Device check today was good.    Current Outpatient Prescriptions   Medication Sig Dispense  Refill   • albuterol (PROAIR HFA) 108 (90 BASE) MCG/ACT inhaler ProAir  (90 Base) MCG/ACT Inhalation Aerosol Solution; Patient Sig: ProAir  (90 Base) MCG/ACT Inhalation Aerosol Solution INHALE 1 TO 2 PUFFS EVERY 4 TO 6 HOURS AS NEEDED.; 0; 23-Jul-2013; Active     • amiodarone (PACERONE) 200 MG tablet Take 1 tablet by mouth Daily. 90 tablet 3   • amLODIPine (NORVASC) 2.5 MG tablet Take 2 tablets by mouth Daily. 180 tablet 3   • aspirin 81 MG tablet Take 81 mg by mouth Daily.     • carvedilol (COREG) 6.25 MG tablet Take 1 tablet by mouth 2 (Two) Times a Day With Meals. 180 tablet 3   • clonazePAM (KLONOPIN) 1 MG tablet Take 1 mg by mouth 3 (Three) Times a Day As Needed.     • clopidogrel (PLAVIX) 75 MG tablet Take 1 tablet by mouth Daily. 90 tablet 3   • COMBIVENT RESPIMAT  MCG/ACT inhaler Inhale 1 puff Daily.     • furosemide (LASIX) 40 MG tablet Take 1 tablet by mouth 2 (Two) Times a Day. 180 tablet 3   • gabapentin (NEURONTIN) 800 MG tablet Take 800 mg by mouth 3 (Three) Times a Day As Needed.     • ipratropium-albuterol (DUO-NEB) 0.5-2.5 mg/mL nebulizer Ipratropium-Albuterol 0.5-2.5 (3) MG/3ML Inhalation Solution; Patient Sig: Ipratropium-Albuterol 0.5-2.5 (3) MG/3ML Inhalation Solution USE 1 UNIT DOSE IN NEBULIZER EVERY 4 HOURS AS NEEDED.; 0; 23-Jul-2013; Active     • lansoprazole (PREVACID) 30 MG capsule Take 30 mg by mouth Daily.     • lisinopril (PRINIVIL,ZESTRIL) 10 MG tablet Take 1 tablet by mouth Daily. 90 tablet 3   • methocarbamol (ROBAXIN) 750 MG tablet Take 1 tablet by mouth 4 (Four) Times a Day As Needed.     • oxyCODONE (ROXICODONE) 30 MG immediate release tablet Take 1 tablet by mouth 4 (Four) Times a Day.     • polyethylene glycol (MIRALAX) powder Take  by mouth Daily.     • potassium chloride (K-DUR) 10 MEQ CR tablet Take 20 Meq daily and then take additional 20 meq with additional lasix 180 tablet 5   • simvastatin (ZOCOR) 10 MG tablet Take 1 tablet by mouth Every Evening. 90  tablet 3   • SYMBICORT 160-4.5 MCG/ACT inhaler Inhale 2 puffs Take As Directed.     • tamsulosin (FLOMAX) 0.4 MG capsule 24 hr capsule Take 1 capsule by mouth Daily.     • AMITIZA 24 MCG capsule Take 24 mcg by mouth Daily As Needed.       No current facility-administered medications for this visit.        ALLERGIES    Review of patient's allergies indicates no known allergies.    Past Medical History:   Diagnosis Date   • 2nd degree atrioventricular block 9/19/2016   • Acute renal insufficiency    • Anxiety    • Arthritis    • Asthma    • Atrial fibrillation 9/19/2016   • Benign prostatic hypertrophy with urinary obstruction 9/19/2016   • Bradycardia 9/19/2016   • CAD (coronary artery disease), native coronary artery 9/19/2016   • Chest pain 9/19/2016   • Congestive heart failure 9/19/2016   • COPD (chronic obstructive pulmonary disease)    • Degeneration of cervical intervertebral disc    • Depression    • Dyslipidemia 9/19/2016   • Hiatal hernia    • Hyperlipidemia    • Hypertension    • Hypogonadism male 9/19/2016   • Incomplete emptying of bladder 9/19/2016   • Male erectile disorder of organic origin 9/19/2016   • Myocardial infarction    • Sleep apnea    • Thrombocytopenia 9/19/2016   • Urinary hesitancy 9/19/2016       Social History     Social History   • Marital status: Single     Spouse name: N/A   • Number of children: N/A   • Years of education: N/A     Occupational History   • Not on file.     Social History Main Topics   • Smoking status: Current Every Day Smoker     Packs/day: 0.50     Types: Cigarettes   • Smokeless tobacco: Never Used   • Alcohol use No   • Drug use: No   • Sexual activity: Defer     Other Topics Concern   • Not on file     Social History Narrative   • No narrative on file       Family History   Problem Relation Age of Onset   • Diabetes Son    • No Known Problems Mother    • No Known Problems Father        Review of Systems   Constitutional: Positive for activity change (limited  "due to chronic back pain ) and fatigue. Negative for appetite change, chills, diaphoresis and fever.   HENT: Positive for congestion and postnasal drip. Negative for dental problem, drooling, ear discharge, ear pain, facial swelling, hearing loss, mouth sores, nosebleeds, rhinorrhea, sinus pain, sinus pressure, sneezing, sore throat, tinnitus, trouble swallowing and voice change.    Eyes: Positive for visual disturbance (wears glasses).   Respiratory: Positive for shortness of breath (with exertion, can't go far without running out of gas). Negative for cough, choking, chest tightness, wheezing and stridor.    Cardiovascular: Positive for leg swelling (doing pretty good ). Negative for chest pain and palpitations.   Gastrointestinal: Positive for constipation. Negative for abdominal pain, blood in stool (no melena, no hematuria, no hematemesis., no hematochezia ), diarrhea, nausea and vomiting.   Endocrine: Negative for cold intolerance and heat intolerance.   Genitourinary: Negative for difficulty urinating, frequency, hematuria and urgency.   Musculoskeletal: Positive for arthralgias, back pain and neck pain.   Skin: Negative for color change, pallor, rash and wound.   Allergic/Immunologic: Positive for environmental allergies.   Neurological: Positive for dizziness (when blood pressure drops ) and light-headedness (when blood pressure drops ). Negative for tremors, seizures, syncope, facial asymmetry, speech difficulty, weakness, numbness and headaches.   Hematological: Negative.  Negative for adenopathy. Does not bruise/bleed easily.   Psychiatric/Behavioral: Negative.        Objective   /90 (BP Location: Left arm, Patient Position: Sitting)   Pulse 70   Ht 175.3 cm (69\")   Wt 83 kg (183 lb)   SpO2 96%   BMI 27.02 kg/m²   Vitals:    04/27/18 0949   BP: 145/90   BP Location: Left arm   Patient Position: Sitting   Pulse: 70   SpO2: 96%   Weight: 83 kg (183 lb)   Height: 175.3 cm (69\")      Lab Results " (most recent)     None        Physical Exam   Constitutional: He is oriented to person, place, and time. Vital signs are normal. He appears well-developed and well-nourished. He is active and cooperative.   HENT:   Head: Normocephalic.   Right Ear: Decreased hearing is noted.   Left Ear: Decreased hearing is noted.   Mouth/Throat: Abnormal dentition (edentulous ).   Eyes: Lids are normal.   Wears glasses    Neck: Normal carotid pulses, no hepatojugular reflux and no JVD present. Carotid bruit is not present.   Cardiovascular: Normal rate, regular rhythm and normal heart sounds.    Pulses:       Radial pulses are 2+ on the right side, and 2+ on the left side.        Dorsalis pedis pulses are 2+ on the right side, and 2+ on the left side.        Posterior tibial pulses are 2+ on the right side, and 2+ on the left side.   No edema BLE.    Pulmonary/Chest: Effort normal and breath sounds normal.   Abdominal: Normal appearance and bowel sounds are normal.   Neurological: He is alert and oriented to person, place, and time.   Skin: Skin is warm, dry and intact.   PPM STEPH   Psychiatric: He has a normal mood and affect. His speech is normal and behavior is normal. Judgment and thought content normal. Cognition and memory are normal.       Procedure     ECG 12 Lead  Date/Time: 4/27/2018 10:24 AM  Performed by: KELLI TERAN  Authorized by: KELLI TERAN   Comparison: compared with previous ECG from 9/9/2014  Rhythm: paced  Rate: normal  BPM: 70  Clinical impression: abnormal ECG                 Assessment/Plan      Diagnosis Plan   1. Ischemic cardiomyopathy  Adult Transthoracic Echo Complete W/ Cont if Necessary Per Protocol   2. Essential hypertension  lisinopril (PRINIVIL,ZESTRIL) 10 MG tablet    amLODIPine (NORVASC) 2.5 MG tablet    Comprehensive Metabolic Panel    Adult Transthoracic Echo Complete W/ Cont if Necessary Per Protocol   3. Coronary artery disease involving native coronary artery of native heart without  angina pectoris  Lipid Panel   4. Paroxysmal atrial fibrillation  amiodarone (PACERONE) 200 MG tablet    TSH    D-dimer, Quantitative   5. Dyslipidemia  simvastatin (ZOCOR) 10 MG tablet    Lipid Panel   6. Smoking     7. Coronary artery disease involving native coronary artery of native heart with angina pectoris  carvedilol (COREG) 6.25 MG tablet    clopidogrel (PLAVIX) 75 MG tablet   8. Cardiomyopathy, unspecified type  furosemide (LASIX) 40 MG tablet   9. Healthcare maintenance  CBC & Differential    TSH    Comprehensive Metabolic Panel    Lipid Panel   10. Malaise and fatigue  CBC & Differential    Adult Transthoracic Echo Complete W/ Cont if Necessary Per Protocol   11. Shortness of breath  Adult Transthoracic Echo Complete W/ Cont if Necessary Per Protocol    D-dimer, Quantitative       Return in about 11 weeks (around 7/13/2018).    CAD - patient is on ASA, beta and statin.  Cardiomyopathy-patient is on Beta, Ace and Diuretic.  HTN - doing well, up today, but he is stressed out.  Afib - on Amio and ASA, needing clearance from GI re: anticoagulation.  Smoking - encouraged on cessation.  CAD/Cardiomyopathy/HTN/Dyslipidemia/shortness of breath/fatigue - will have echo.  He will get lipids, CMP, TSH, D-Dimer and CBC.  He will continue his medication regimen for now.  He will follow-up in 11 weeks or sooner if any changes.    Will D/C Plavix at next appt so he can have repeat Colon and then hopefully get him on anticoagulation.        I advised the patient of the risks in continuing to use tobacco, and I provided this patient with smoking cessation educational materials.    During this visit, I spent <3  minutes counseling the patient regarding smoking cessation.    Patient's Body mass index is 27.02 kg/m². BMI is within normal parameters. No follow-up required.      Electronically signed by:

## 2018-05-16 ENCOUNTER — APPOINTMENT (OUTPATIENT)
Dept: CARDIOLOGY | Facility: HOSPITAL | Age: 73
End: 2018-05-16

## 2018-05-24 ENCOUNTER — HOSPITAL ENCOUNTER (OUTPATIENT)
Dept: CARDIOLOGY | Facility: HOSPITAL | Age: 73
Discharge: HOME | End: 2018-05-24
Admitting: NURSE PRACTITIONER

## 2018-05-24 ENCOUNTER — OUTSIDE FACILITY SERVICE (OUTPATIENT)
Dept: CARDIOLOGY | Facility: CLINIC | Age: 73
End: 2018-05-24

## 2018-05-24 DIAGNOSIS — R06.02 SHORTNESS OF BREATH: ICD-10-CM

## 2018-05-24 DIAGNOSIS — I10 ESSENTIAL HYPERTENSION: ICD-10-CM

## 2018-05-24 DIAGNOSIS — R53.83 MALAISE AND FATIGUE: ICD-10-CM

## 2018-05-24 DIAGNOSIS — I25.5 ISCHEMIC CARDIOMYOPATHY: ICD-10-CM

## 2018-05-24 DIAGNOSIS — R53.81 MALAISE AND FATIGUE: ICD-10-CM

## 2018-05-24 LAB
MAXIMAL PREDICTED HEART RATE: 148 BPM
STRESS TARGET HR: 126 BPM

## 2018-05-24 PROCEDURE — 93306 TTE W/DOPPLER COMPLETE: CPT | Performed by: INTERNAL MEDICINE

## 2018-05-24 PROCEDURE — 93306 TTE W/DOPPLER COMPLETE: CPT

## 2018-05-29 ENCOUNTER — TELEPHONE (OUTPATIENT)
Dept: CARDIOLOGY | Facility: CLINIC | Age: 73
End: 2018-05-29

## 2018-05-29 NOTE — TELEPHONE ENCOUNTER
Attempted to reach patient re: Echo results. No answer. Will continue to attempt to reach patient.

## 2018-06-04 ENCOUNTER — TELEPHONE (OUTPATIENT)
Dept: CARDIOLOGY | Facility: CLINIC | Age: 73
End: 2018-06-04

## 2018-07-03 ENCOUNTER — OFFICE VISIT (OUTPATIENT)
Dept: CARDIOLOGY | Facility: CLINIC | Age: 73
End: 2018-07-03

## 2018-07-03 ENCOUNTER — LAB (OUTPATIENT)
Dept: LAB | Facility: HOSPITAL | Age: 73
End: 2018-07-03

## 2018-07-03 VITALS
SYSTOLIC BLOOD PRESSURE: 98 MMHG | OXYGEN SATURATION: 93 % | BODY MASS INDEX: 26.19 KG/M2 | WEIGHT: 176.8 LBS | HEIGHT: 69 IN | DIASTOLIC BLOOD PRESSURE: 60 MMHG | HEART RATE: 60 BPM

## 2018-07-03 DIAGNOSIS — I25.5 ISCHEMIC CARDIOMYOPATHY: ICD-10-CM

## 2018-07-03 DIAGNOSIS — R07.9 CHEST PAIN, UNSPECIFIED TYPE: ICD-10-CM

## 2018-07-03 DIAGNOSIS — J06.9 UPPER RESPIRATORY TRACT INFECTION, UNSPECIFIED TYPE: ICD-10-CM

## 2018-07-03 DIAGNOSIS — E78.5 DYSLIPIDEMIA: ICD-10-CM

## 2018-07-03 DIAGNOSIS — I48.0 PAROXYSMAL ATRIAL FIBRILLATION (HCC): ICD-10-CM

## 2018-07-03 DIAGNOSIS — I25.119 CORONARY ARTERY DISEASE INVOLVING NATIVE CORONARY ARTERY OF NATIVE HEART WITH ANGINA PECTORIS (HCC): Primary | ICD-10-CM

## 2018-07-03 DIAGNOSIS — I10 ESSENTIAL HYPERTENSION: ICD-10-CM

## 2018-07-03 DIAGNOSIS — R06.02 SHORTNESS OF BREATH: ICD-10-CM

## 2018-07-03 DIAGNOSIS — Z95.0 PACEMAKER: ICD-10-CM

## 2018-07-03 DIAGNOSIS — F17.200 SMOKING: ICD-10-CM

## 2018-07-03 PROCEDURE — 99213 OFFICE O/P EST LOW 20 MIN: CPT | Performed by: NURSE PRACTITIONER

## 2018-07-03 PROCEDURE — 83880 ASSAY OF NATRIURETIC PEPTIDE: CPT | Performed by: NURSE PRACTITIONER

## 2018-07-03 PROCEDURE — 85379 FIBRIN DEGRADATION QUANT: CPT | Performed by: NURSE PRACTITIONER

## 2018-07-03 PROCEDURE — 36415 COLL VENOUS BLD VENIPUNCTURE: CPT

## 2018-07-03 PROCEDURE — 93000 ELECTROCARDIOGRAM COMPLETE: CPT | Performed by: NURSE PRACTITIONER

## 2018-07-03 RX ORDER — AMOXICILLIN AND CLAVULANATE POTASSIUM 875; 125 MG/1; MG/1
1 TABLET, FILM COATED ORAL EVERY 12 HOURS SCHEDULED
Qty: 10 TABLET | Refills: 0 | Status: SHIPPED | OUTPATIENT
Start: 2018-07-03 | End: 2018-10-26

## 2018-07-03 NOTE — PATIENT INSTRUCTIONS
Steps to Quit Smoking  Smoking tobacco can be bad for your health. It can also affect almost every organ in your body. Smoking puts you and people around you at risk for many serious long-lasting (chronic) diseases. Quitting smoking is hard, but it is one of the best things that you can do for your health. It is never too late to quit.  What are the benefits of quitting smoking?  When you quit smoking, you lower your risk for getting serious diseases and conditions. They can include:  · Lung cancer or lung disease.  · Heart disease.  · Stroke.  · Heart attack.  · Not being able to have children (infertility).  · Weak bones (osteoporosis) and broken bones (fractures).    If you have coughing, wheezing, and shortness of breath, those symptoms may get better when you quit. You may also get sick less often. If you are pregnant, quitting smoking can help to lower your chances of having a baby of low birth weight.  What can I do to help me quit smoking?  Talk with your doctor about what can help you quit smoking. Some things you can do (strategies) include:  · Quitting smoking totally, instead of slowly cutting back how much you smoke over a period of time.  · Going to in-person counseling. You are more likely to quit if you go to many counseling sessions.  · Using resources and support systems, such as:  ? Online chats with a counselor.  ? Phone quitlines.  ? Printed self-help materials.  ? Support groups or group counseling.  ? Text messaging programs.  ? Mobile phone apps or applications.  · Taking medicines. Some of these medicines may have nicotine in them. If you are pregnant or breastfeeding, do not take any medicines to quit smoking unless your doctor says it is okay. Talk with your doctor about counseling or other things that can help you.    Talk with your doctor about using more than one strategy at the same time, such as taking medicines while you are also going to in-person counseling. This can help make  quitting easier.  What things can I do to make it easier to quit?  Quitting smoking might feel very hard at first, but there is a lot that you can do to make it easier. Take these steps:  · Talk to your family and friends. Ask them to support and encourage you.  · Call phone quitlines, reach out to support groups, or work with a counselor.  · Ask people who smoke to not smoke around you.  · Avoid places that make you want (trigger) to smoke, such as:  ? Bars.  ? Parties.  ? Smoke-break areas at work.  · Spend time with people who do not smoke.  · Lower the stress in your life. Stress can make you want to smoke. Try these things to help your stress:  ? Getting regular exercise.  ? Deep-breathing exercises.  ? Yoga.  ? Meditating.  ? Doing a body scan. To do this, close your eyes, focus on one area of your body at a time from head to toe, and notice which parts of your body are tense. Try to relax the muscles in those areas.  · Download or buy apps on your mobile phone or tablet that can help you stick to your quit plan. There are many free apps, such as QuitGuide from the CDC (Centers for Disease Control and Prevention). You can find more support from smokefree.gov and other websites.    This information is not intended to replace advice given to you by your health care provider. Make sure you discuss any questions you have with your health care provider.  Document Released: 10/14/2010 Document Revised: 08/15/2017 Document Reviewed: 05/03/2016  [employer] Interactive Patient Education © 2018 [employer]  Fat and Cholesterol Restricted Diet  Getting too much fat and cholesterol in your diet may cause health problems. Following this diet helps keep your fat and cholesterol at normal levels. This can keep you from getting sick.  What types of fat should I choose?  · Choose monosaturated and polyunsaturated fats. These are found in foods such as olive oil, canola oil, flaxseeds, walnuts, almonds, and seeds.  · Eat more  "omega-3 fats. Good choices include salmon, mackerel, sardines, tuna, flaxseed oil, and ground flaxseeds.  · Limit saturated fats. These are in animal products such as meats, butter, and cream. They can also be in plant products such as palm oil, palm kernel oil, and coconut oil.  · Avoid foods with partially hydrogenated oils in them. These contain trans fats. Examples of foods that have trans fats are stick margarine, some tub margarines, cookies, crackers, and other baked goods.  What general guidelines do I need to follow?  · Check food labels. Look for the words \"trans fat\" and \"saturated fat.\"  · When preparing a meal:  ? Fill half of your plate with vegetables and green salads.  ? Fill one fourth of your plate with whole grains. Look for the word \"whole\" as the first word in the ingredient list.  ? Fill one fourth of your plate with lean protein foods.  · Eat more foods that have fiber, like apples, carrots, beans, peas, and barley.  · Eat more home-cooked foods. Eat less at restaurants and buffets.  · Limit or avoid alcohol.  · Limit foods high in starch and sugar.  · Limit fried foods.  · Cook foods without frying them. Baking, boiling, grilling, and broiling are all great options.  · Lose weight if you are overweight. Losing even a small amount of weight can help your overall health. It can also help prevent diseases such as diabetes and heart disease.  What foods can I eat?  Grains  Whole grains, such as whole wheat or whole grain breads, crackers, cereals, and pasta. Unsweetened oatmeal, bulgur, barley, quinoa, or brown rice. Corn or whole wheat flour tortillas.  Vegetables  Fresh or frozen vegetables (raw, steamed, roasted, or grilled). Green salads.  Fruits  All fresh, canned (in natural juice), or frozen fruits.  Meat and Other Protein Products  Ground beef (85% or leaner), grass-fed beef, or beef trimmed of fat. Skinless chicken or turkey. Ground chicken or turkey. Pork trimmed of fat. All fish and " seafood. Eggs. Dried beans, peas, or lentils. Unsalted nuts or seeds. Unsalted canned or dry beans.  Dairy  Low-fat dairy products, such as skim or 1% milk, 2% or reduced-fat cheeses, low-fat ricotta or cottage cheese, or plain low-fat yogurt.  Fats and Oils  Tub margarines without trans fats. Light or reduced-fat mayonnaise and salad dressings. Avocado. Olive, canola, sesame, or safflower oils. Natural peanut or almond butter (choose ones without added sugar and oil).  The items listed above may not be a complete list of recommended foods or beverages. Contact your dietitian for more options.  What foods are not recommended?  Grains  White bread. White pasta. White rice. Cornbread. Bagels, pastries, and croissants. Crackers that contain trans fat.  Vegetables  White potatoes. Corn. Creamed or fried vegetables. Vegetables in a cheese sauce.  Fruits  Dried fruits. Canned fruit in light or heavy syrup. Fruit juice.  Meat and Other Protein Products  Fatty cuts of meat. Ribs, chicken wings, collins, sausage, bologna, salami, chitterlings, fatback, hot dogs, bratwurst, and packaged luncheon meats. Liver and organ meats.  Dairy  Whole or 2% milk, cream, half-and-half, and cream cheese. Whole milk cheeses. Whole-fat or sweetened yogurt. Full-fat cheeses. Nondairy creamers and whipped toppings. Processed cheese, cheese spreads, or cheese curds.  Sweets and Desserts  Corn syrup, sugars, honey, and molasses. Candy. Jam and jelly. Syrup. Sweetened cereals. Cookies, pies, cakes, donuts, muffins, and ice cream.  Fats and Oils  Butter, stick margarine, lard, shortening, ghee, or collins fat. Coconut, palm kernel, or palm oils.  Beverages  Alcohol. Sweetened drinks (such as sodas, lemonade, and fruit drinks or punches).  The items listed above may not be a complete list of foods and beverages to avoid. Contact your dietitian for more information.  This information is not intended to replace advice given to you by your health care  provider. Make sure you discuss any questions you have with your health care provider.  Document Released: 06/18/2013 Document Revised: 08/24/2017 Document Reviewed: 03/19/2015  [employer] Interactive Patient Education © 2018 [employer]  BMI for Adults  Body mass index (BMI) is a number that is calculated from a person's weight and height. In most adults, the number is used to find how much of an adult's weight is made up of fat. BMI is not as accurate as a direct measure of body fat.  How is BMI calculated?  BMI is calculated by dividing weight in kilograms by height in meters squared. It can also be calculated by dividing weight in pounds by height in inches squared, then multiplying the resulting number by 703. Charts are available to help you find your BMI quickly and easily without doing this calculation.  How is BMI interpreted?  Health care professionals use BMI charts to identify whether an adult is underweight, at a normal weight, or overweight based on the following guidelines:  · Underweight: BMI less than 18.5.  · Normal weight: BMI between 18.5 and 24.9.  · Overweight: BMI between 25 and 29.9.  · Obese: BMI of 30 and above.    BMI is usually interpreted the same for males and females.  Weight includes both fat and muscle, so someone with a muscular build, such as an athlete, may have a BMI that is higher than 24.9. In cases like these, BMI may not accurately depict body fat. To determine if excess body fat is the cause of a BMI of 25 or higher, further assessments may need to be done by a health care provider.  Why is BMI a useful tool?  BMI is used to identify a possible weight problem that may be related to a medical problem or may increase the risk for medical problems. BMI can also be used to promote changes to reach a healthy weight.  This information is not intended to replace advice given to you by your health care provider. Make sure you discuss any questions you have with your health care  provider.  Document Released: 08/29/2005 Document Revised: 04/27/2017 Document Reviewed: 05/15/2015  [employer] Interactive Patient Education © 2018 [employer]

## 2018-07-03 NOTE — PROGRESS NOTES
Chito Nuñez is a 73 y.o. male     Chief Complaint   Patient presents with   • Follow-up     11 week echo follow up    • Coronary Artery Disease   • Atrial Fibrillation   • Cardiomyopathy     ischemic        HPI    Problem List:    1. CAD  1.1 [facility] 2012 - LT Main 10-20%; LAD 40-50%; Circ 100%; RCA 20-30% RCA and 30-40%; medical management   1.2 Stress Test 1/11/17 - Anterior ischemia; cannot rule out inferior as well; decreased LVEF; intermediate risk   1.3 [facility] 1/30/17 - Stent to RCA; EF 20-25%  2. Hypertension  3. Atrial Flutter Paroxysmal CHADS 2  3.1 No anticoagulation due to GIB   3.2 Event Monitor 12/15-12/28/16 - Atrial Fib, atrial flutter, Pacemaker tachycardia   4. 2:1 AVB with presyncope/Bradycardia symptomatic   4.1 Dual chamber PPM Guidant 10/8/14  5. Dyslipidemia   6. Shortness of breath  6.1 Echo 3/11/14 - mod LVH; EF 40-50%; mild left ventricular hypokinesis; mild MR; PA 36  6.2 Echo 1/11/17 - mild LVH; EF 35-40%; DD II; mild MR and TR; PA 35-40  6.3 Echo 5/24/18-mild LVH, EF 35-40%, diastolic dysfunction 1, trace MR, mild dilation of aortic root  7. Smoking Habituation   8. Cardiomyopathy   8.1 MUGA 3/22/17 - EF 40%    Patient is a 73-year-old male who presents today for follow-up with his girlfriend side.  Patient said that he had one episode of chest heaviness and it occurred last night.  He says it was after he forgot to take his medications and then took extra accidentally.  He says he has not really had any other episodes.  He also thinks is possibly due to the fact that he has upper respiratory infection.  Any further cardiac workup is warranted.  He denies any palpitations, fluttering, presyncope, syncope, orthopnea, PND or edema.  He says yesterday that he forgot to take his medicines until late and he felt really weak and lightheaded.  He says that he accidentally take an extra cholesterol medicine and asked her blood pressure medicine.  He says he really only notices shortness  of breath with the heat.  PCP monitors his cholesterol.  Patient has one more large polyp that they want removed from his stomach.  I did advise he is able to come off of his Plavix at this time however we still need to get him started on some form of anticoagulant for his A. fib.  He has a follow-up with Dr. Parham curing the near future.  Patient is smoking a pack a day.    Current Outpatient Prescriptions   Medication Sig Dispense Refill   • albuterol (PROAIR HFA) 108 (90 BASE) MCG/ACT inhaler ProAir  (90 Base) MCG/ACT Inhalation Aerosol Solution; Patient Sig: ProAir  (90 Base) MCG/ACT Inhalation Aerosol Solution INHALE 1 TO 2 PUFFS EVERY 4 TO 6 HOURS AS NEEDED.; 0; 23-Jul-2013; Active     • amiodarone (PACERONE) 200 MG tablet Take 1 tablet by mouth Daily. 90 tablet 3   • amLODIPine (NORVASC) 2.5 MG tablet Take 2 tablets by mouth Daily. 180 tablet 3   • aspirin 81 MG tablet Take 81 mg by mouth Daily.     • carvedilol (COREG) 6.25 MG tablet Take 1 tablet by mouth 2 (Two) Times a Day With Meals. 180 tablet 3   • clonazePAM (KLONOPIN) 1 MG tablet Take 1 mg by mouth 3 (Three) Times a Day As Needed.     • clopidogrel (PLAVIX) 75 MG tablet Take 1 tablet by mouth Daily. 90 tablet 3   • furosemide (LASIX) 40 MG tablet Take 1 tablet by mouth 2 (Two) Times a Day. 180 tablet 3   • gabapentin (NEURONTIN) 800 MG tablet Take 800 mg by mouth 3 (Three) Times a Day As Needed.     • ipratropium-albuterol (DUO-NEB) 0.5-2.5 mg/mL nebulizer Ipratropium-Albuterol 0.5-2.5 (3) MG/3ML Inhalation Solution; Patient Sig: Ipratropium-Albuterol 0.5-2.5 (3) MG/3ML Inhalation Solution USE 1 UNIT DOSE IN NEBULIZER EVERY 4 HOURS AS NEEDED.; 0; 23-Jul-2013; Active     • lansoprazole (PREVACID) 30 MG capsule Take 30 mg by mouth Daily.     • lisinopril (PRINIVIL,ZESTRIL) 10 MG tablet Take 1 tablet by mouth Daily. 90 tablet 3   • methocarbamol (ROBAXIN) 750 MG tablet Take 1 tablet by mouth 4 (Four) Times a Day As Needed.     • oxyCODONE  (ROXICODONE) 30 MG immediate release tablet Take 1 tablet by mouth 4 (Four) Times a Day.     • polyethylene glycol (MIRALAX) powder Take  by mouth Daily.     • potassium chloride (K-DUR) 10 MEQ CR tablet Take 20 Meq daily and then take additional 20 meq with additional lasix 180 tablet 5   • simvastatin (ZOCOR) 10 MG tablet Take 1 tablet by mouth Every Evening. 90 tablet 3   • SYMBICORT 160-4.5 MCG/ACT inhaler Inhale 2 puffs Take As Directed.     • tamsulosin (FLOMAX) 0.4 MG capsule 24 hr capsule Take 1 capsule by mouth Daily.     • amoxicillin-clavulanate (AUGMENTIN) 875-125 MG per tablet Take 1 tablet by mouth Every 12 (Twelve) Hours. 10 tablet 0   • COMBIVENT RESPIMAT  MCG/ACT inhaler Inhale 1 puff Daily.       No current facility-administered medications for this visit.        ALLERGIES    Patient has no known allergies.    Past Medical History:   Diagnosis Date   • 2nd degree atrioventricular block 9/19/2016   • Acute renal insufficiency    • Anxiety    • Arthritis    • Asthma    • Atrial fibrillation (CMS/HCC) 9/19/2016   • Benign prostatic hypertrophy with urinary obstruction 9/19/2016   • Bradycardia 9/19/2016   • CAD (coronary artery disease), native coronary artery 9/19/2016   • Chest pain 9/19/2016   • Congestive heart failure (CMS/HCC) 9/19/2016   • COPD (chronic obstructive pulmonary disease) (CMS/[facility])    • Degeneration of cervical intervertebral disc    • Depression    • Dyslipidemia 9/19/2016   • Hiatal hernia    • Hyperlipidemia    • Hypertension    • Hypogonadism male 9/19/2016   • Incomplete emptying of bladder 9/19/2016   • Male erectile disorder of organic origin 9/19/2016   • Myocardial infarction    • Sleep apnea    • Thrombocytopenia (CMS/HCC) 9/19/2016   • Urinary hesitancy 9/19/2016       Social History     Social History   • Marital status: Single     Spouse name: N/A   • Number of children: N/A   • Years of education: N/A     Occupational History   • Not on file.     Social History  Main Topics   • Smoking status: Current Every Day Smoker     Packs/day: 0.50     Types: Cigarettes   • Smokeless tobacco: Never Used   • Alcohol use No   • Drug use: No   • Sexual activity: Defer     Other Topics Concern   • Not on file     Social History Narrative   • No narrative on file       Family History   Problem Relation Age of Onset   • Diabetes Son    • No Known Problems Mother    • No Known Problems Father        Review of Systems   Constitutional: Positive for fatigue. Negative for chills, diaphoresis and fever.   HENT: Positive for congestion, dental problem (edentulous ), postnasal drip, rhinorrhea, sinus pain and sinus pressure. Negative for tinnitus, trouble swallowing and voice change.    Eyes: Positive for visual disturbance (wears glasses).   Respiratory: Positive for cough, shortness of breath (more in the heat ) and wheezing. Negative for choking and chest tightness.    Cardiovascular: Positive for chest pain (heaviness, intermittent at night when he lays down; did not take his meds yesterday til 5:30 pm and thinks this is why  ). Negative for palpitations (denies afib symptoms) and leg swelling.   Gastrointestinal: Positive for constipation (uses mirlax ). Negative for abdominal pain, blood in stool (no melena, no hematuria, no hematemesis, no hematochezia ), diarrhea, nausea and vomiting.   Endocrine: Negative for cold intolerance and heat intolerance.   Genitourinary: Positive for difficulty urinating. Negative for frequency, hematuria and urgency.   Musculoskeletal: Positive for arthralgias, back pain (chronic), neck pain and neck stiffness.   Skin: Negative for color change, pallor, rash and wound.   Allergic/Immunologic: Positive for environmental allergies (seasonal ).   Neurological: Positive for weakness (generalized; after he forgot to take his meds yesterday) and light-headedness (rare; after he forget his meds yesterday ). Negative for dizziness, tremors, seizures, syncope, facial  "asymmetry, speech difficulty, numbness and headaches.   Hematological: Negative for adenopathy. Does not bruise/bleed easily.   Psychiatric/Behavioral: Negative.        Objective   BP 98/60 (BP Location: Left arm, Patient Position: Sitting)   Pulse 60   Ht 175.3 cm (69\")   Wt 80.2 kg (176 lb 12.8 oz)   SpO2 93%   BMI 26.11 kg/m²   Vitals:    07/03/18 1416   BP: 98/60   BP Location: Left arm   Patient Position: Sitting   Pulse: 60   SpO2: 93%   Weight: 80.2 kg (176 lb 12.8 oz)   Height: 175.3 cm (69\")      Lab Results (most recent)     None        Physical Exam   Constitutional: He is oriented to person, place, and time. Vital signs are normal. He appears well-developed and well-nourished. He is active and cooperative.   HENT:   Head: Normocephalic.   Mouth/Throat: Abnormal dentition (edentulous ).   Eyes: Lids are normal.   Wears glasses    Neck: Normal carotid pulses, no hepatojugular reflux and no JVD present. Carotid bruit is not present.   Cardiovascular: Normal rate, regular rhythm and normal heart sounds.    Pulses:       Radial pulses are 2+ on the right side, and 2+ on the left side.        Dorsalis pedis pulses are 2+ on the right side, and 2+ on the left side.        Posterior tibial pulses are 2+ on the right side, and 2+ on the left side.   No edema BLE.    Pulmonary/Chest: Effort normal. He has wheezes in the right lower field and the left upper field.   Abdominal: Normal appearance and bowel sounds are normal.   Neurological: He is alert and oriented to person, place, and time.   Skin: Skin is warm, dry and intact. Bruising noted.   PPM STEPH    Psychiatric: He has a normal mood and affect. His speech is normal and behavior is normal. Judgment and thought content normal. Cognition and memory are normal.       Procedure     ECG 12 Lead  Date/Time: 7/3/2018 2:44 PM  Performed by: KELLI TERAN  Authorized by: KELLI TERAN   Comparison: not compared with previous ECG   Rhythm: paced  Rate: " normal  BPM: 70  QRS axis: extreme  Clinical impression: abnormal ECG                 Assessment/Plan       Diagnosis Plan   1. Coronary artery disease involving native coronary artery of native heart with angina pectoris (CMS/HCC)     2. Ischemic cardiomyopathy     3. Essential hypertension     4. Paroxysmal atrial fibrillation (CMS/HCC)     5. Dyslipidemia     6. Chest pain, unspecified type  proBNP    D-dimer, Quantitative   7. Pacemaker     8. Smoking     9. Upper respiratory tract infection, unspecified type  amoxicillin-clavulanate (AUGMENTIN) 875-125 MG per tablet   10. Shortness of breath  proBNP    D-dimer, Quantitative       Return in about 3 months (around 10/3/2018), or same day as PPM Check .       CAD-patient is on aspirin, beta and statin.  Ischemic cardiomyopathy-patient is on beta, Ace and diuretic.  Proximal A. fib-patient is on amiodarone and metoprolol.  He is currently only on Plavix and aspirin.  Dyslipidemia-patient is on simvastatin and monitor by PCP.  Chest pain/shortness of breath-patient will get a BNP and d-dimer.  Smoking-patient encouraged on cessation.  URI-patient was sent to an antibiotic.  He will continue his medication regimen.  He will follow-up in 3 months or sooner if any changes.    I advised Kandice of the risks of continuing to use tobacco, and I provided him with tobacco cessation educational materials in the After Visit Summary.     During this visit, I spent <3  minutes counseling the patient regarding tobacco cessation.    Patient's Body mass index is 26.11 kg/m². BMI is within normal parameters. No follow-up required.      Electronically signed by:

## 2018-07-04 LAB — D DIMER PPP FEU-MCNC: 0.62 MCGFEU/ML (ref 0–0.5)

## 2018-07-05 ENCOUNTER — TELEPHONE (OUTPATIENT)
Dept: CARDIOLOGY | Facility: CLINIC | Age: 73
End: 2018-07-05

## 2018-07-05 DIAGNOSIS — R06.02 SHORTNESS OF BREATH: Primary | ICD-10-CM

## 2018-07-05 DIAGNOSIS — R07.2 PRECORDIAL PAIN: ICD-10-CM

## 2018-07-05 DIAGNOSIS — R79.89 ELEVATED D-DIMER: ICD-10-CM

## 2018-07-05 NOTE — TELEPHONE ENCOUNTER
I have called patient and made patient aware of abnroaml d-dimer. patientis willing to proceed with CTA of chest and wants that done in [place] if able as that is where he lives. Patient is usure of when his next apt. With Dr. Parham . I have attempted to contact Dr. Parham's office twice with no answer an in in regards to below. I informed patient that SANTOSH Denise will get scheduled and give him a call with date and time. I am awaiting for Dr. Conteh's office to return call.     ----- Message from MARCELA Escobedo sent at 7/5/2018  7:21 AM EDT -----  Will you advise patient and set up CTA chest due to afib and not on anticoagulation.  Also will you call Dr. Cooks office and find out when patient's next appt is.  Patient had labs with them as well, please have them fax.  Can you also advised he was referred to them to see if he is acceptable risk for anticoagulation, but I have not received anything saying I should or should not start on anticoagulation.    Thanks!

## 2018-07-05 NOTE — TELEPHONE ENCOUNTER
Spoke with Karl, she will put a note in his chart to make sure they start labeling his medications with indications/diagnosis.

## 2018-07-06 LAB — NT-PROBNP SERPL-MCNC: 1606 PG/ML (ref 0–376)

## 2018-07-11 ENCOUNTER — HOSPITAL ENCOUNTER (OUTPATIENT)
Dept: CT IMAGING | Facility: HOSPITAL | Age: 73
Discharge: HOME | End: 2018-07-11
Admitting: NURSE PRACTITIONER

## 2018-07-11 ENCOUNTER — LAB (OUTPATIENT)
Dept: LAB | Facility: HOSPITAL | Age: 73
End: 2018-07-11

## 2018-07-11 DIAGNOSIS — R07.9 CHEST PAIN, UNSPECIFIED TYPE: ICD-10-CM

## 2018-07-11 DIAGNOSIS — I48.0 PAROXYSMAL ATRIAL FIBRILLATION (HCC): ICD-10-CM

## 2018-07-11 DIAGNOSIS — R53.81 MALAISE AND FATIGUE: ICD-10-CM

## 2018-07-11 DIAGNOSIS — R06.02 SHORTNESS OF BREATH: ICD-10-CM

## 2018-07-11 DIAGNOSIS — R53.83 MALAISE AND FATIGUE: ICD-10-CM

## 2018-07-11 DIAGNOSIS — I25.10 CORONARY ARTERY DISEASE INVOLVING NATIVE CORONARY ARTERY OF NATIVE HEART WITHOUT ANGINA PECTORIS: ICD-10-CM

## 2018-07-11 DIAGNOSIS — I10 ESSENTIAL HYPERTENSION: ICD-10-CM

## 2018-07-11 DIAGNOSIS — R79.89 ELEVATED D-DIMER: Primary | ICD-10-CM

## 2018-07-11 DIAGNOSIS — E78.5 DYSLIPIDEMIA: ICD-10-CM

## 2018-07-11 DIAGNOSIS — Z00.00 HEALTHCARE MAINTENANCE: ICD-10-CM

## 2018-07-11 LAB
ALBUMIN SERPL-MCNC: 4 G/DL (ref 3.4–4.8)
ALBUMIN/GLOB SERPL: 1.5 G/DL (ref 1.5–2.5)
ALP SERPL-CCNC: 106 U/L (ref 40–129)
ALT SERPL W P-5'-P-CCNC: 8 U/L (ref 10–44)
ANION GAP SERPL CALCULATED.3IONS-SCNC: 2.9 MMOL/L (ref 3.6–11.2)
AST SERPL-CCNC: 17 U/L (ref 10–34)
BASOPHILS # BLD AUTO: 0.06 10*3/MM3 (ref 0–0.3)
BASOPHILS NFR BLD AUTO: 0.9 % (ref 0–2)
BILIRUB SERPL-MCNC: 0.3 MG/DL (ref 0.2–1.8)
BUN BLD-MCNC: 19 MG/DL (ref 7–21)
BUN/CREAT SERPL: 17.1 (ref 7–25)
CALCIUM SPEC-SCNC: 8.7 MG/DL (ref 7.7–10)
CHLORIDE SERPL-SCNC: 102 MMOL/L (ref 99–112)
CHOLEST SERPL-MCNC: 119 MG/DL (ref 0–200)
CO2 SERPL-SCNC: 33.1 MMOL/L (ref 24.3–31.9)
CREAT BLD-MCNC: 1.11 MG/DL (ref 0.43–1.29)
D DIMER PPP FEU-MCNC: 0.6 MCGFEU/ML (ref 0–0.5)
DEPRECATED RDW RBC AUTO: 45 FL (ref 37–54)
EOSINOPHIL # BLD AUTO: 0.34 10*3/MM3 (ref 0–0.7)
EOSINOPHIL NFR BLD AUTO: 5 % (ref 0–7)
ERYTHROCYTE [DISTWIDTH] IN BLOOD BY AUTOMATED COUNT: 17.1 % (ref 11.5–14.5)
GFR SERPL CREATININE-BSD FRML MDRD: 65 ML/MIN/1.73
GLOBULIN UR ELPH-MCNC: 2.7 GM/DL
GLUCOSE BLD-MCNC: 93 MG/DL (ref 70–110)
HCT VFR BLD AUTO: 40.1 % (ref 42–52)
HDLC SERPL-MCNC: 48 MG/DL (ref 60–100)
HGB BLD-MCNC: 12.1 G/DL (ref 14–18)
IMM GRANULOCYTES # BLD: 0.02 10*3/MM3 (ref 0–0.03)
IMM GRANULOCYTES NFR BLD: 0.3 % (ref 0–0.5)
LDLC SERPL CALC-MCNC: 58 MG/DL (ref 0–100)
LDLC/HDLC SERPL: 1.22 {RATIO}
LYMPHOCYTES # BLD AUTO: 1.68 10*3/MM3 (ref 1–3)
LYMPHOCYTES NFR BLD AUTO: 24.8 % (ref 16–46)
MCH RBC QN AUTO: 22.6 PG (ref 27–33)
MCHC RBC AUTO-ENTMCNC: 30.2 G/DL (ref 33–37)
MCV RBC AUTO: 75 FL (ref 80–94)
MONOCYTES # BLD AUTO: 0.99 10*3/MM3 (ref 0.1–0.9)
MONOCYTES NFR BLD AUTO: 14.6 % (ref 0–12)
NEUTROPHILS # BLD AUTO: 3.69 10*3/MM3 (ref 1.4–6.5)
NEUTROPHILS NFR BLD AUTO: 54.4 % (ref 40–75)
OSMOLALITY SERPL CALC.SUM OF ELEC: 277.6 MOSM/KG (ref 273–305)
PLATELET # BLD AUTO: 252 10*3/MM3 (ref 130–400)
PMV BLD AUTO: 11.1 FL (ref 6–10)
POTASSIUM BLD-SCNC: 4.9 MMOL/L (ref 3.5–5.3)
PROT SERPL-MCNC: 6.7 G/DL (ref 6–8)
RBC # BLD AUTO: 5.35 10*6/MM3 (ref 4.7–6.1)
SODIUM BLD-SCNC: 138 MMOL/L (ref 135–153)
TRIGL SERPL-MCNC: 63 MG/DL (ref 0–150)
TSH SERPL DL<=0.05 MIU/L-ACNC: 7.29 MIU/ML (ref 0.55–4.78)
VLDLC SERPL-MCNC: 12.6 MG/DL
WBC NRBC COR # BLD: 6.78 10*3/MM3 (ref 4.5–12.5)

## 2018-07-11 PROCEDURE — 82565 ASSAY OF CREATININE: CPT

## 2018-07-11 PROCEDURE — 80061 LIPID PANEL: CPT

## 2018-07-11 PROCEDURE — 36415 COLL VENOUS BLD VENIPUNCTURE: CPT

## 2018-07-11 PROCEDURE — 85025 COMPLETE CBC W/AUTO DIFF WBC: CPT

## 2018-07-11 PROCEDURE — 84443 ASSAY THYROID STIM HORMONE: CPT

## 2018-07-11 PROCEDURE — 71275 CT ANGIOGRAPHY CHEST: CPT

## 2018-07-11 PROCEDURE — 85379 FIBRIN DEGRADATION QUANT: CPT

## 2018-07-11 PROCEDURE — 0 IOPAMIDOL PER 1 ML: Performed by: NURSE PRACTITIONER

## 2018-07-11 PROCEDURE — 80053 COMPREHEN METABOLIC PANEL: CPT

## 2018-07-11 PROCEDURE — 71275 CT ANGIOGRAPHY CHEST: CPT | Performed by: RADIOLOGY

## 2018-07-11 RX ADMIN — IOPAMIDOL 100 ML: 755 INJECTION, SOLUTION INTRAVENOUS at 12:00

## 2018-07-16 ENCOUNTER — TELEPHONE (OUTPATIENT)
Dept: CARDIOLOGY | Facility: CLINIC | Age: 73
End: 2018-07-16

## 2018-07-16 DIAGNOSIS — R60.9 EDEMA, UNSPECIFIED TYPE: Primary | ICD-10-CM

## 2018-07-16 DIAGNOSIS — D64.9 LOW HEMOGLOBIN AND LOW HEMATOCRIT: ICD-10-CM

## 2018-07-16 RX ORDER — FERROUS SULFATE 325(65) MG
325 TABLET ORAL
Qty: 30 TABLET | Refills: 6 | Status: SHIPPED | OUTPATIENT
Start: 2018-07-16 | End: 2018-10-26

## 2018-07-16 RX ORDER — METOLAZONE 2.5 MG/1
2.5 TABLET ORAL DAILY
Qty: 3 TABLET | Refills: 0 | Status: SHIPPED | OUTPATIENT
Start: 2018-07-16 | End: 2018-10-11 | Stop reason: SDUPTHER

## 2018-07-16 NOTE — TELEPHONE ENCOUNTER
----- Message from Pamela Lofton MA sent at 7/11/2018 11:58 AM EDT -----      ----- Message -----  From: MARCELA Escobedo  Sent: 7/9/2018   7:25 AM  To: Pamela Lofton MA    Have patient stop Norvasc.  Have him take 2.5 of metolazone for 3 days 30 min prior to to his first dose of lasix.  And confirm he is taking his lasix as prescribed.  Thanks!    Spoke with patient and wife re: lab results reviewed with patient as well as CT Scan results. Instructed to stop Norvasc and will take Metolazone for next 3 days. Instructed re: H&H. Patient requests [facility] send in Iron tablets for him to take. Per Radha may send in Iron as well.

## 2018-07-19 ENCOUNTER — APPOINTMENT (OUTPATIENT)
Dept: CT IMAGING | Facility: HOSPITAL | Age: 73
End: 2018-07-19

## 2018-07-20 ENCOUNTER — TELEPHONE (OUTPATIENT)
Dept: CARDIOLOGY | Facility: CLINIC | Age: 73
End: 2018-07-20

## 2018-07-20 NOTE — TELEPHONE ENCOUNTER
Spoke with nurse, they will call patient to try and get patient back in.  He has not been in to see them in a year, but is due back in Aug.  They will call to make sure he gets back in.  Until then they cannot advise re: anticoagulation.

## 2018-07-31 LAB — CREAT BLDA-MCNC: 1.2 MG/DL (ref 0.6–1.3)

## 2018-10-11 ENCOUNTER — TELEPHONE (OUTPATIENT)
Dept: CARDIOLOGY | Facility: CLINIC | Age: 73
End: 2018-10-11

## 2018-10-11 ENCOUNTER — RESULTS ENCOUNTER (OUTPATIENT)
Dept: CARDIOLOGY | Facility: CLINIC | Age: 73
End: 2018-10-11

## 2018-10-11 DIAGNOSIS — R06.02 SHORTNESS OF BREATH: ICD-10-CM

## 2018-10-11 DIAGNOSIS — R06.02 SHORTNESS OF BREATH: Primary | ICD-10-CM

## 2018-10-11 DIAGNOSIS — R60.9 EDEMA, UNSPECIFIED TYPE: ICD-10-CM

## 2018-10-11 RX ORDER — METOLAZONE 2.5 MG/1
2.5 TABLET ORAL DAILY
Qty: 3 TABLET | Refills: 0 | Status: SHIPPED | OUTPATIENT
Start: 2018-10-11 | End: 2018-10-26

## 2018-10-11 NOTE — TELEPHONE ENCOUNTER
----- Message from MARCELA Escobedo sent at 10/9/2018 12:40 PM EDT -----  Can you see if he is taking his Lasix 40 mg BID, if so have him take his metolazone for the next 3 days 30  Min prior to lasix.  Have him go today and get BMP, Mg and BNP.      10/9/18 4:40 Left Message. MALKA DURAN  10/10/18 2:30 Left Message. MALKA DURAN    Spoke with patient. Re: above orders received from [facility]. Patient states he takes his Lasix 40 mg twice a day and will take Metolazone for the next 3 days. He requests script be sent in for Metolazone, script sent. Patient states he will get labs drawn the next time he is in [place], he is unable to have them drawn where he lives.

## 2018-10-26 ENCOUNTER — OFFICE VISIT (OUTPATIENT)
Dept: CARDIOLOGY | Facility: CLINIC | Age: 73
End: 2018-10-26

## 2018-10-26 VITALS
BODY MASS INDEX: 27.76 KG/M2 | HEIGHT: 69 IN | WEIGHT: 187.4 LBS | SYSTOLIC BLOOD PRESSURE: 138 MMHG | HEART RATE: 72 BPM | DIASTOLIC BLOOD PRESSURE: 77 MMHG | OXYGEN SATURATION: 93 %

## 2018-10-26 DIAGNOSIS — E78.5 DYSLIPIDEMIA: ICD-10-CM

## 2018-10-26 DIAGNOSIS — I25.119 CORONARY ARTERY DISEASE INVOLVING NATIVE CORONARY ARTERY OF NATIVE HEART WITH ANGINA PECTORIS (HCC): ICD-10-CM

## 2018-10-26 DIAGNOSIS — N42.1 CONGESTION AND HEMORRHAGE OF PROSTATE: ICD-10-CM

## 2018-10-26 DIAGNOSIS — R05.9 COUGH: ICD-10-CM

## 2018-10-26 DIAGNOSIS — Z95.0 PACEMAKER: ICD-10-CM

## 2018-10-26 DIAGNOSIS — I25.5 ISCHEMIC CARDIOMYOPATHY: ICD-10-CM

## 2018-10-26 DIAGNOSIS — I25.5 ISCHEMIC CARDIOMYOPATHY: Primary | ICD-10-CM

## 2018-10-26 DIAGNOSIS — Z00.00 HEALTHCARE MAINTENANCE: ICD-10-CM

## 2018-10-26 DIAGNOSIS — J06.9 UPPER RESPIRATORY TRACT INFECTION, UNSPECIFIED TYPE: ICD-10-CM

## 2018-10-26 DIAGNOSIS — F17.200 SMOKING: ICD-10-CM

## 2018-10-26 DIAGNOSIS — I10 ESSENTIAL HYPERTENSION: ICD-10-CM

## 2018-10-26 DIAGNOSIS — R06.02 SHORTNESS OF BREATH: ICD-10-CM

## 2018-10-26 DIAGNOSIS — I48.0 PAROXYSMAL ATRIAL FIBRILLATION (HCC): Primary | ICD-10-CM

## 2018-10-26 PROCEDURE — 93288 INTERROG EVL PM/LDLS PM IP: CPT | Performed by: INTERNAL MEDICINE

## 2018-10-26 PROCEDURE — 99214 OFFICE O/P EST MOD 30 MIN: CPT | Performed by: NURSE PRACTITIONER

## 2018-10-26 RX ORDER — LEVOFLOXACIN 500 MG/1
500 TABLET, FILM COATED ORAL DAILY
Qty: 10 TABLET | Refills: 0 | Status: SHIPPED | OUTPATIENT
Start: 2018-10-26 | End: 2019-03-22

## 2018-10-26 RX ORDER — METHYLPREDNISOLONE 4 MG/1
TABLET ORAL
Qty: 21 EACH | Refills: 0 | Status: SHIPPED | OUTPATIENT
Start: 2018-10-26 | End: 2019-03-22

## 2018-10-26 RX ORDER — CARVEDILOL 6.25 MG/1
6.25 TABLET ORAL 2 TIMES DAILY WITH MEALS
Qty: 180 TABLET | Refills: 3 | Status: SHIPPED | OUTPATIENT
Start: 2018-10-26 | End: 2019-01-07 | Stop reason: SDUPTHER

## 2018-10-26 RX ORDER — POTASSIUM CHLORIDE 750 MG/1
TABLET, FILM COATED, EXTENDED RELEASE ORAL
Qty: 180 TABLET | Refills: 5 | Status: SHIPPED | OUTPATIENT
Start: 2018-10-26 | End: 2019-03-22 | Stop reason: SDUPTHER

## 2018-10-26 RX ORDER — CLOPIDOGREL BISULFATE 75 MG/1
75 TABLET ORAL DAILY
Qty: 90 TABLET | Refills: 3 | Status: SHIPPED | OUTPATIENT
Start: 2018-10-26 | End: 2019-01-07 | Stop reason: SDUPTHER

## 2018-10-26 RX ORDER — NICOTINE 21 MG/24HR
1 PATCH, TRANSDERMAL 24 HOURS TRANSDERMAL EVERY 24 HOURS
Qty: 28 EACH | Refills: 3 | Status: SHIPPED | OUTPATIENT
Start: 2018-10-26 | End: 2019-03-22

## 2018-10-26 RX ORDER — AMIODARONE HYDROCHLORIDE 200 MG/1
200 TABLET ORAL DAILY
Qty: 90 TABLET | Refills: 3 | Status: SHIPPED | OUTPATIENT
Start: 2018-10-26 | End: 2019-01-07 | Stop reason: SDUPTHER

## 2018-10-26 NOTE — PATIENT INSTRUCTIONS
Steps to Quit Smoking  Smoking tobacco can be bad for your health. It can also affect almost every organ in your body. Smoking puts you and people around you at risk for many serious long-lasting (chronic) diseases. Quitting smoking is hard, but it is one of the best things that you can do for your health. It is never too late to quit.  What are the benefits of quitting smoking?  When you quit smoking, you lower your risk for getting serious diseases and conditions. They can include:  · Lung cancer or lung disease.  · Heart disease.  · Stroke.  · Heart attack.  · Not being able to have children (infertility).  · Weak bones (osteoporosis) and broken bones (fractures).    If you have coughing, wheezing, and shortness of breath, those symptoms may get better when you quit. You may also get sick less often. If you are pregnant, quitting smoking can help to lower your chances of having a baby of low birth weight.  What can I do to help me quit smoking?  Talk with your doctor about what can help you quit smoking. Some things you can do (strategies) include:  · Quitting smoking totally, instead of slowly cutting back how much you smoke over a period of time.  · Going to in-person counseling. You are more likely to quit if you go to many counseling sessions.  · Using resources and support systems, such as:  ? Online chats with a counselor.  ? Phone quitlines.  ? Printed self-help materials.  ? Support groups or group counseling.  ? Text messaging programs.  ? Mobile phone apps or applications.  · Taking medicines. Some of these medicines may have nicotine in them. If you are pregnant or breastfeeding, do not take any medicines to quit smoking unless your doctor says it is okay. Talk with your doctor about counseling or other things that can help you.    Talk with your doctor about using more than one strategy at the same time, such as taking medicines while you are also going to in-person counseling. This can help make  quitting easier.  What things can I do to make it easier to quit?  Quitting smoking might feel very hard at first, but there is a lot that you can do to make it easier. Take these steps:  · Talk to your family and friends. Ask them to support and encourage you.  · Call phone quitlines, reach out to support groups, or work with a counselor.  · Ask people who smoke to not smoke around you.  · Avoid places that make you want (trigger) to smoke, such as:  ? Bars.  ? Parties.  ? Smoke-break areas at work.  · Spend time with people who do not smoke.  · Lower the stress in your life. Stress can make you want to smoke. Try these things to help your stress:  ? Getting regular exercise.  ? Deep-breathing exercises.  ? Yoga.  ? Meditating.  ? Doing a body scan. To do this, close your eyes, focus on one area of your body at a time from head to toe, and notice which parts of your body are tense. Try to relax the muscles in those areas.  · Download or buy apps on your mobile phone or tablet that can help you stick to your quit plan. There are many free apps, such as QuitGuide from the CDC (Centers for Disease Control and Prevention). You can find more support from smokefree.gov and other websites.    This information is not intended to replace advice given to you by your health care provider. Make sure you discuss any questions you have with your health care provider.  Document Released: 10/14/2010 Document Revised: 08/15/2017 Document Reviewed: 05/03/2016  [employer] Interactive Patient Education © 2018 [employer]  Fat and Cholesterol Restricted Diet  Getting too much fat and cholesterol in your diet may cause health problems. Following this diet helps keep your fat and cholesterol at normal levels. This can keep you from getting sick.  What types of fat should I choose?  · Choose monosaturated and polyunsaturated fats. These are found in foods such as olive oil, canola oil, flaxseeds, walnuts, almonds, and seeds.  · Eat more  "omega-3 fats. Good choices include salmon, mackerel, sardines, tuna, flaxseed oil, and ground flaxseeds.  · Limit saturated fats. These are in animal products such as meats, butter, and cream. They can also be in plant products such as palm oil, palm kernel oil, and coconut oil.  · Avoid foods with partially hydrogenated oils in them. These contain trans fats. Examples of foods that have trans fats are stick margarine, some tub margarines, cookies, crackers, and other baked goods.  What general guidelines do I need to follow?  · Check food labels. Look for the words \"trans fat\" and \"saturated fat.\"  · When preparing a meal:  ? Fill half of your plate with vegetables and green salads.  ? Fill one fourth of your plate with whole grains. Look for the word \"whole\" as the first word in the ingredient list.  ? Fill one fourth of your plate with lean protein foods.  · Eat more foods that have fiber, like apples, carrots, beans, peas, and barley.  · Eat more home-cooked foods. Eat less at restaurants and buffets.  · Limit or avoid alcohol.  · Limit foods high in starch and sugar.  · Limit fried foods.  · Cook foods without frying them. Baking, boiling, grilling, and broiling are all great options.  · Lose weight if you are overweight. Losing even a small amount of weight can help your overall health. It can also help prevent diseases such as diabetes and heart disease.  What foods can I eat?  Grains  Whole grains, such as whole wheat or whole grain breads, crackers, cereals, and pasta. Unsweetened oatmeal, bulgur, barley, quinoa, or brown rice. Corn or whole wheat flour tortillas.  Vegetables  Fresh or frozen vegetables (raw, steamed, roasted, or grilled). Green salads.  Fruits  All fresh, canned (in natural juice), or frozen fruits.  Meat and Other Protein Products  Ground beef (85% or leaner), grass-fed beef, or beef trimmed of fat. Skinless chicken or turkey. Ground chicken or turkey. Pork trimmed of fat. All fish and " seafood. Eggs. Dried beans, peas, or lentils. Unsalted nuts or seeds. Unsalted canned or dry beans.  Dairy  Low-fat dairy products, such as skim or 1% milk, 2% or reduced-fat cheeses, low-fat ricotta or cottage cheese, or plain low-fat yogurt.  Fats and Oils  Tub margarines without trans fats. Light or reduced-fat mayonnaise and salad dressings. Avocado. Olive, canola, sesame, or safflower oils. Natural peanut or almond butter (choose ones without added sugar and oil).  The items listed above may not be a complete list of recommended foods or beverages. Contact your dietitian for more options.  What foods are not recommended?  Grains  White bread. White pasta. White rice. Cornbread. Bagels, pastries, and croissants. Crackers that contain trans fat.  Vegetables  White potatoes. Corn. Creamed or fried vegetables. Vegetables in a cheese sauce.  Fruits  Dried fruits. Canned fruit in light or heavy syrup. Fruit juice.  Meat and Other Protein Products  Fatty cuts of meat. Ribs, chicken wings, collins, sausage, bologna, salami, chitterlings, fatback, hot dogs, bratwurst, and packaged luncheon meats. Liver and organ meats.  Dairy  Whole or 2% milk, cream, half-and-half, and cream cheese. Whole milk cheeses. Whole-fat or sweetened yogurt. Full-fat cheeses. Nondairy creamers and whipped toppings. Processed cheese, cheese spreads, or cheese curds.  Sweets and Desserts  Corn syrup, sugars, honey, and molasses. Candy. Jam and jelly. Syrup. Sweetened cereals. Cookies, pies, cakes, donuts, muffins, and ice cream.  Fats and Oils  Butter, stick margarine, lard, shortening, ghee, or collins fat. Coconut, palm kernel, or palm oils.  Beverages  Alcohol. Sweetened drinks (such as sodas, lemonade, and fruit drinks or punches).  The items listed above may not be a complete list of foods and beverages to avoid. Contact your dietitian for more information.  This information is not intended to replace advice given to you by your health care  provider. Make sure you discuss any questions you have with your health care provider.  Document Released: 06/18/2013 Document Revised: 08/24/2017 Document Reviewed: 03/19/2015  [employer] Interactive Patient Education © 2018 [employer]  BMI for Adults  Body mass index (BMI) is a number that is calculated from a person's weight and height. In most adults, the number is used to find how much of an adult's weight is made up of fat. BMI is not as accurate as a direct measure of body fat.  How is BMI calculated?  BMI is calculated by dividing weight in kilograms by height in meters squared. It can also be calculated by dividing weight in pounds by height in inches squared, then multiplying the resulting number by 703. Charts are available to help you find your BMI quickly and easily without doing this calculation.  How is BMI interpreted?  Health care professionals use BMI charts to identify whether an adult is underweight, at a normal weight, or overweight based on the following guidelines:  · Underweight: BMI less than 18.5.  · Normal weight: BMI between 18.5 and 24.9.  · Overweight: BMI between 25 and 29.9.  · Obese: BMI of 30 and above.    BMI is usually interpreted the same for males and females.  Weight includes both fat and muscle, so someone with a muscular build, such as an athlete, may have a BMI that is higher than 24.9. In cases like these, BMI may not accurately depict body fat. To determine if excess body fat is the cause of a BMI of 25 or higher, further assessments may need to be done by a health care provider.  Why is BMI a useful tool?  BMI is used to identify a possible weight problem that may be related to a medical problem or may increase the risk for medical problems. BMI can also be used to promote changes to reach a healthy weight.  This information is not intended to replace advice given to you by your health care provider. Make sure you discuss any questions you have with your health care  provider.  Document Released: 08/29/2005 Document Revised: 04/27/2017 Document Reviewed: 05/15/2015  [employer] Interactive Patient Education © 2018 [employer]

## 2018-10-26 NOTE — PROGRESS NOTES
Subjective   Kandice Nuñez is a 73 y.o. male     Chief Complaint   Patient presents with   • Follow-up     Pt in for 3 month follow up appt   • Coronary Artery Disease       HPI    Problem List:    1. CAD  1.1 [facility] 2012 - LT Main 10-20%; LAD 40-50%; Circ 100%; RCA 20-30% RCA and 30-40%; medical management   1.2 Stress Test 1/11/17 - Anterior ischemia; cannot rule out inferior as well; decreased LVEF; intermediate risk   1.3 [facility] 1/30/17 - Stent to RCA; EF 20-25%  2. Hypertension  3. Atrial Flutter Paroxysmal CHADS 2  3.1 No anticoagulation due to GIB   3.2 Event Monitor 12/15-12/28/16 - Atrial Fib, atrial flutter, Pacemaker tachycardia   4. 2:1 AVB with presyncope/Bradycardia symptomatic   4.1 Dual chamber PPM Guidant 10/8/14  5. Dyslipidemia   6. Shortness of breath  6.1 Echo 3/11/14 - mod LVH; EF 40-50%; mild left ventricular hypokinesis; mild MR; PA 36  6.2 Echo 1/11/17 - mild LVH; EF 35-40%; DD II; mild MR and TR; PA 35-40  6.3 Echo 5/24/18-mild LVH, EF 35-40%, diastolic dysfunction 1, trace MR, mild dilation of aortic root  7. Smoking Habituation   8. Cardiomyopathy   8.1 [facility]GA 3/22/17 - EF 40%    Patient is a 73-year-old male who presents today for follow-up with his girlfriend at his side.  He denies any chest pain, pressure, palpitations, fluttering, presyncope, syncope, orthopnea, PND or edema.  She says he does get dizzy with position change.  He says that he has had a productive cough for over a week now with congestion and shortness of breath that has increased.  He says it is much better today since he took the extra 3 pills I sent in for him.  However he does some like he possibly has some pneumonia.  He says his sputum is yellow that he is coughing up.  He is still smoking about a pack a day.  His O2 sat walking back was only 88% once he sat down we get it up to 93% room air.  Patient never followed up with GI suite were never given clearance for him to go ahead and start anticoagulation for his A. fib  says this point he is still only on aspirin and Plavix.    Current Outpatient Prescriptions   Medication Sig Dispense Refill   • albuterol (PROAIR HFA) 108 (90 BASE) MCG/ACT inhaler ProAir  (90 Base) MCG/ACT Inhalation Aerosol Solution; Patient Sig: ProAir  (90 Base) MCG/ACT Inhalation Aerosol Solution INHALE 1 TO 2 PUFFS EVERY 4 TO 6 HOURS AS NEEDED.; 0; 23-Jul-2013; Active     • amiodarone (PACERONE) 200 MG tablet Take 1 tablet by mouth Daily. 90 tablet 3   • aspirin 81 MG tablet Take 81 mg by mouth Daily.     • carvedilol (COREG) 6.25 MG tablet Take 1 tablet by mouth 2 (Two) Times a Day With Meals. 180 tablet 3   • clonazePAM (KLONOPIN) 1 MG tablet Take 1 mg by mouth 3 (Three) Times a Day As Needed.     • clopidogrel (PLAVIX) 75 MG tablet Take 1 tablet by mouth Daily. 90 tablet 3   • COMBIVENT RESPIMAT  MCG/ACT inhaler Inhale 1 puff Daily.     • furosemide (LASIX) 40 MG tablet Take 1 tablet by mouth 2 (Two) Times a Day. 180 tablet 3   • gabapentin (NEURONTIN) 800 MG tablet Take 800 mg by mouth 3 (Three) Times a Day As Needed.     • ipratropium-albuterol (DUO-NEB) 0.5-2.5 mg/mL nebulizer Ipratropium-Albuterol 0.5-2.5 (3) MG/3ML Inhalation Solution; Patient Sig: Ipratropium-Albuterol 0.5-2.5 (3) MG/3ML Inhalation Solution USE 1 UNIT DOSE IN NEBULIZER EVERY 4 HOURS AS NEEDED.; 0; 23-Jul-2013; Active     • lansoprazole (PREVACID) 30 MG capsule Take 30 mg by mouth Daily.     • lisinopril (PRINIVIL,ZESTRIL) 10 MG tablet Take 1 tablet by mouth Daily. 90 tablet 3   • methocarbamol (ROBAXIN) 750 MG tablet Take 1 tablet by mouth 4 (Four) Times a Day As Needed.     • oxyCODONE (ROXICODONE) 30 MG immediate release tablet Take 1 tablet by mouth 4 (Four) Times a Day.     • polyethylene glycol (MIRALAX) powder Take  by mouth Daily.     • potassium chloride (K-DUR) 10 MEQ CR tablet Take 20 Meq daily and then take additional 20 meq with additional lasix 180 tablet 5   • simvastatin (ZOCOR) 10 MG tablet Take 1  tablet by mouth Every Evening. 90 tablet 3   • SYMBICORT 160-4.5 MCG/ACT inhaler Inhale 2 puffs Take As Directed.     • tamsulosin (FLOMAX) 0.4 MG capsule 24 hr capsule Take 1 capsule by mouth Daily.     • levoFLOXacin (LEVAQUIN) 500 MG tablet Take 1 tablet by mouth Daily. 10 tablet 0   • MethylPREDNISolone (MEDROL, MACK,) 4 MG tablet Take as directed on package instructions. 21 each 0   • nicotine (NICODERM CQ) 21 MG/24HR patch Place 1 patch on the skin as directed by provider Daily. 28 each 3     No current facility-administered medications for this visit.        ALLERGIES    Patient has no known allergies.    Past Medical History:   Diagnosis Date   • 2nd degree atrioventricular block 9/19/2016   • Acute renal insufficiency    • Anxiety    • Arthritis    • Asthma    • Atrial fibrillation (CMS/HCC) 9/19/2016   • Benign prostatic hypertrophy with urinary obstruction 9/19/2016   • Bradycardia 9/19/2016   • CAD (coronary artery disease), native coronary artery 9/19/2016   • Chest pain 9/19/2016   • Congestive heart failure (CMS/HCC) 9/19/2016   • COPD (chronic obstructive pulmonary disease) (CMS/[facility])    • Degeneration of cervical intervertebral disc    • Depression    • Dyslipidemia 9/19/2016   • Hiatal hernia    • Hyperlipidemia    • Hypertension    • Hypogonadism male 9/19/2016   • Incomplete emptying of bladder 9/19/2016   • Male erectile disorder of organic origin 9/19/2016   • Myocardial infarction (CMS/[facility])    • Sleep apnea    • Thrombocytopenia (CMS/[facility]) 9/19/2016   • Urinary hesitancy 9/19/2016       Social History     Social History   • Marital status: Single     Spouse name: N/A   • Number of children: N/A   • Years of education: N/A     Occupational History   • Not on file.     Social History Main Topics   • Smoking status: Current Every Day Smoker     Packs/day: 0.50     Types: Cigarettes   • Smokeless tobacco: Never Used   • Alcohol use No   • Drug use: No   • Sexual activity: Defer     Other Topics  Concern   • Not on file     Social History Narrative   • No narrative on file       Family History   Problem Relation Age of Onset   • Diabetes Son    • No Known Problems Mother    • No Known Problems Father        Review of Systems   Constitutional: Positive for fatigue (Pt still gets fatigued, but states he's doing better than he was).   HENT: Positive for congestion (Pt states that he's been sick since late 9/2018), rhinorrhea and sore throat.    Eyes: Positive for visual disturbance (glasses daily).   Respiratory: Positive for cough (Productive- thick yellow sputum) and shortness of breath (SoB w/ and w/o activity). Negative for chest tightness.    Cardiovascular: Negative for chest pain, palpitations and leg swelling.   Gastrointestinal: Positive for constipation and diarrhea. Negative for abdominal pain, blood in stool, nausea (Pt states that he had to stop taking iron pills because they were causing N/V anhd abd pain. ) and vomiting.   Endocrine: Positive for cold intolerance. Negative for heat intolerance.   Genitourinary: Positive for frequency (when he takes his Lasix, he has urinary frequency. ). Negative for difficulty urinating, dysuria, hematuria and urgency.   Musculoskeletal: Positive for arthralgias, back pain and neck pain.   Skin: Positive for wound (Left wrist wound from dog). Negative for rash.   Allergic/Immunologic: Positive for environmental allergies (seasonal). Negative for food allergies.   Neurological: Positive for dizziness (occasionally w/position changes), weakness (generalized) and numbness (from back injury- fingers and toes). Negative for syncope, light-headedness and headaches.   Hematological: Bruises/bleeds easily.   Psychiatric/Behavioral: Positive for sleep disturbance (Pt states that a few weeks ago he woke up SoA). Negative for agitation and confusion. The patient is nervous/anxious.        Objective   /77 (BP Location: Left arm, Patient Position: Sitting)   Pulse 72   " Ht 175.3 cm (69\")   Wt 85 kg (187 lb 6.4 oz)   SpO2 93%   BMI 27.67 kg/m²   Vitals:    10/26/18 1022 10/26/18 1037   BP: 138/77    BP Location: Left arm    Patient Position: Sitting    Pulse: 72    SpO2: (!) 88% 93%   Weight: 85 kg (187 lb 6.4 oz)    Height: 175.3 cm (69\")       Lab Results (most recent)     None        Physical Exam   Constitutional: He is oriented to person, place, and time. Vital signs are normal. He appears well-developed and well-nourished. He is active and cooperative.   HENT:   Head: Normocephalic.   Mouth/Throat: He has dentures (upper and lower ).   Eyes: Lids are normal.   Wears glasses    Neck: Normal carotid pulses, no hepatojugular reflux and no JVD present. Carotid bruit is not present.   Cardiovascular: Normal rate, regular rhythm and normal heart sounds.    Pulses:       Radial pulses are 2+ on the right side, and 2+ on the left side.        Dorsalis pedis pulses are 2+ on the right side, and 2+ on the left side.        Posterior tibial pulses are 2+ on the right side, and 2+ on the left side.   No edema BLE.    Pulmonary/Chest: Tachypnea noted. He has rales in the right lower field and the left lower field.   Abdominal: Normal appearance and bowel sounds are normal.   Neurological: He is alert and oriented to person, place, and time.   Skin: Skin is warm, dry and intact.   AICD STEPH    Psychiatric: He has a normal mood and affect. His speech is normal and behavior is normal. Judgment and thought content normal. Cognition and memory are normal.       Procedure   Procedures         Assessment/Plan      Diagnosis Plan   1. Paroxysmal atrial fibrillation (CMS/HCC)  Magnesium    amiodarone (PACERONE) 200 MG tablet   2. Coronary artery disease involving native coronary artery of native heart with angina pectoris (CMS/HCC)  clopidogrel (PLAVIX) 75 MG tablet    carvedilol (COREG) 6.25 MG tablet   3. Essential hypertension  Basic Metabolic Panel    Magnesium   4. Ischemic cardiomyopathy  " potassium chloride (K-DUR) 10 MEQ CR tablet   5. Pacemaker     6. Smoking  nicotine (NICODERM CQ) 21 MG/24HR patch   7. Dyslipidemia     8. Upper respiratory tract infection, unspecified type  XR Chest PA & Lateral    CBC & Differential    levoFLOXacin (LEVAQUIN) 500 MG tablet    XR Chest PA & Lateral    MethylPREDNISolone (MEDROL, MACK,) 4 MG tablet   9. Shortness of breath  XR Chest PA & Lateral    CBC & Differential    XR Chest PA & Lateral   10. Cough  XR Chest PA & Lateral    CBC & Differential    XR Chest PA & Lateral    MethylPREDNISolone (MEDROL, MACK,) 4 MG tablet   11. Congestion and hemorrhage of prostate  XR Chest PA & Lateral    CBC & Differential    XR Chest PA & Lateral    MethylPREDNISolone (MEDROL, MACK,) 4 MG tablet   12. Healthcare maintenance  Basic Metabolic Panel    Magnesium    CBC & Differential       Return in about 3 months (around 1/26/2019), or with AICD check .    A. fib-patient is on amiodarone, beta, aspirin and Plavix.  CAD-patient is on aspirin, statin and beta.  Hypertension-patient doing well.  Ischemic cardiomyopathy-patient is on Lasix, beta and ace.  Dyslipidemia-patient is on simvastatin and monitor by PCP.  Permit pacemaker-check today with no episodes.  Smoking-patient will start NicoDerm patches.  URI/shortness of breath/cough last congestion-patient will get a chest x-ray PA and lateral, he will start Levaquin daily.  He will start a prednisone Dosepak.  He will get a BMP, magnesium and CBC.  He will continue his medication regimen otherwise.  He will follow-up in 3 months or sooner if any changes.       I advised Kandice of the risks of continuing to use tobacco, and I provided him with tobacco cessation educational materials in the After Visit Summary.     During this visit, I spent <3 minutes counseling the patient regarding tobacco cessation.    Patient's Body mass index is 27.67 kg/m². BMI is above normal parameters. Recommendations include: educational  material.      Electronically signed by:

## 2018-10-29 ENCOUNTER — TELEPHONE (OUTPATIENT)
Dept: CARDIOLOGY | Facility: CLINIC | Age: 73
End: 2018-10-29

## 2018-10-29 NOTE — TELEPHONE ENCOUNTER
Informed pt of normal chest x-ray results and to please keep his appointment in Jan-2019. Pt verbalized understanding and denied having any questions or concerns.

## 2018-10-29 NOTE — TELEPHONE ENCOUNTER
----- Message from MARCELA Escobedo sent at 10/29/2018  1:25 PM EDT -----  Please advise patient.  No changes in meds.  ThankS.

## 2018-10-30 ENCOUNTER — TELEPHONE (OUTPATIENT)
Dept: CARDIOLOGY | Facility: CLINIC | Age: 73
End: 2018-10-30

## 2018-10-30 NOTE — TELEPHONE ENCOUNTER
Informed pt of his lab results, stated he had a few things a little out of range, but nothing that requires tx; and he verbalized understanding.

## 2018-10-30 NOTE — TELEPHONE ENCOUNTER
----- Message from MARCELA Escobedo sent at 10/29/2018  3:25 PM EDT -----  Please advise patient of labs.

## 2019-01-07 ENCOUNTER — TELEPHONE (OUTPATIENT)
Dept: CARDIOLOGY | Facility: CLINIC | Age: 74
End: 2019-01-07

## 2019-01-07 DIAGNOSIS — E78.5 DYSLIPIDEMIA: ICD-10-CM

## 2019-01-07 DIAGNOSIS — I10 ESSENTIAL HYPERTENSION: ICD-10-CM

## 2019-01-07 DIAGNOSIS — I25.119 CORONARY ARTERY DISEASE INVOLVING NATIVE CORONARY ARTERY OF NATIVE HEART WITH ANGINA PECTORIS (HCC): ICD-10-CM

## 2019-01-07 DIAGNOSIS — I48.0 PAROXYSMAL ATRIAL FIBRILLATION (HCC): ICD-10-CM

## 2019-01-07 RX ORDER — SIMVASTATIN 10 MG
10 TABLET ORAL EVERY EVENING
Qty: 90 TABLET | Refills: 3 | Status: SHIPPED | OUTPATIENT
Start: 2019-01-07 | End: 2019-03-22 | Stop reason: SDUPTHER

## 2019-01-07 RX ORDER — LISINOPRIL 10 MG/1
10 TABLET ORAL DAILY
Qty: 90 TABLET | Refills: 3 | Status: SHIPPED | OUTPATIENT
Start: 2019-01-07 | End: 2019-03-22 | Stop reason: SDUPTHER

## 2019-01-07 RX ORDER — CARVEDILOL 6.25 MG/1
6.25 TABLET ORAL 2 TIMES DAILY WITH MEALS
Qty: 180 TABLET | Refills: 3 | Status: SHIPPED | OUTPATIENT
Start: 2019-01-07 | End: 2019-03-22 | Stop reason: SDUPTHER

## 2019-01-07 RX ORDER — TAMSULOSIN HYDROCHLORIDE 0.4 MG/1
1 CAPSULE ORAL DAILY
Qty: 30 CAPSULE | Refills: 0 | Status: SHIPPED | OUTPATIENT
Start: 2019-01-07 | End: 2020-12-01 | Stop reason: SDUPTHER

## 2019-01-07 RX ORDER — AMIODARONE HYDROCHLORIDE 200 MG/1
200 TABLET ORAL DAILY
Qty: 90 TABLET | Refills: 3 | Status: SHIPPED | OUTPATIENT
Start: 2019-01-07 | End: 2019-03-22 | Stop reason: SDUPTHER

## 2019-01-07 RX ORDER — CLOPIDOGREL BISULFATE 75 MG/1
75 TABLET ORAL DAILY
Qty: 90 TABLET | Refills: 3 | Status: SHIPPED | OUTPATIENT
Start: 2019-01-07 | End: 2019-03-22 | Stop reason: SDUPTHER

## 2019-01-07 NOTE — TELEPHONE ENCOUNTER
----- Message from Rhoda Amor MA sent at 1/7/2019  3:56 PM EST -----  Patient request a refill and next appointment date/time. No other information was given.

## 2019-01-07 NOTE — TELEPHONE ENCOUNTER
----- Message from Kin Murphy sent at 1/7/2019  9:39 AM EST -----  P[T NEEDING REFILL ON MEDICATION AMICOIARONE 200 MG AND TAMSULOSIN 0.4 MG TO [facility]. PT IS COMPLETELY OUT OF MEDICATION.PT NEEDING ASAP

## 2019-01-07 NOTE — TELEPHONE ENCOUNTER
Refill request was addressed earlier today. In the future, needs to have Flomax refilled by PCP or prescribing provider.        Next appt is 1/25/19 at 10:15am.     Pt's number is disconnected.   Spoke cecy/ Rhoda, whom took the message off the machine, she stated pt called from [phone]. (updated in chart)    Tried calling that number, pt states it's his new number. Informed him of upcoming appt date and time. Informed him of RF, pt states he's supposed to get all heart meds RF and not to listen to px. Sent in RF, per pt request.

## 2019-03-22 ENCOUNTER — OFFICE VISIT (OUTPATIENT)
Dept: CARDIOLOGY | Facility: CLINIC | Age: 74
End: 2019-03-22

## 2019-03-22 VITALS
HEIGHT: 69 IN | WEIGHT: 191.2 LBS | SYSTOLIC BLOOD PRESSURE: 92 MMHG | BODY MASS INDEX: 28.32 KG/M2 | DIASTOLIC BLOOD PRESSURE: 53 MMHG | OXYGEN SATURATION: 94 % | HEART RATE: 73 BPM

## 2019-03-22 DIAGNOSIS — I25.119 CORONARY ARTERY DISEASE INVOLVING NATIVE CORONARY ARTERY OF NATIVE HEART WITH ANGINA PECTORIS (HCC): Primary | ICD-10-CM

## 2019-03-22 DIAGNOSIS — R00.2 PALPITATIONS: ICD-10-CM

## 2019-03-22 DIAGNOSIS — E78.5 DYSLIPIDEMIA: ICD-10-CM

## 2019-03-22 DIAGNOSIS — I47.29 NSVT (NONSUSTAINED VENTRICULAR TACHYCARDIA) (HCC): ICD-10-CM

## 2019-03-22 DIAGNOSIS — I25.5 ISCHEMIC CARDIOMYOPATHY: Primary | ICD-10-CM

## 2019-03-22 DIAGNOSIS — D64.9 ANEMIA, UNSPECIFIED TYPE: ICD-10-CM

## 2019-03-22 DIAGNOSIS — I50.20 SYSTOLIC CONGESTIVE HEART FAILURE, UNSPECIFIED HF CHRONICITY (HCC): ICD-10-CM

## 2019-03-22 DIAGNOSIS — F17.200 SMOKING: ICD-10-CM

## 2019-03-22 DIAGNOSIS — I48.0 PAROXYSMAL ATRIAL FIBRILLATION (HCC): ICD-10-CM

## 2019-03-22 DIAGNOSIS — Z00.00 HEALTHCARE MAINTENANCE: ICD-10-CM

## 2019-03-22 DIAGNOSIS — I42.9 CARDIOMYOPATHY, UNSPECIFIED TYPE (HCC): ICD-10-CM

## 2019-03-22 DIAGNOSIS — I10 ESSENTIAL HYPERTENSION: ICD-10-CM

## 2019-03-22 DIAGNOSIS — Z95.0 PACEMAKER: ICD-10-CM

## 2019-03-22 DIAGNOSIS — I25.5 ISCHEMIC CARDIOMYOPATHY: ICD-10-CM

## 2019-03-22 PROCEDURE — 93000 ELECTROCARDIOGRAM COMPLETE: CPT | Performed by: NURSE PRACTITIONER

## 2019-03-22 PROCEDURE — 93288 INTERROG EVL PM/LDLS PM IP: CPT | Performed by: INTERNAL MEDICINE

## 2019-03-22 PROCEDURE — 99213 OFFICE O/P EST LOW 20 MIN: CPT | Performed by: NURSE PRACTITIONER

## 2019-03-22 RX ORDER — CARVEDILOL 6.25 MG/1
6.25 TABLET ORAL 2 TIMES DAILY WITH MEALS
Qty: 180 TABLET | Refills: 3 | Status: SHIPPED | OUTPATIENT
Start: 2019-03-22 | End: 2019-09-27 | Stop reason: SDUPTHER

## 2019-03-22 RX ORDER — SIMVASTATIN 10 MG
10 TABLET ORAL EVERY EVENING
Qty: 90 TABLET | Refills: 3 | Status: SHIPPED | OUTPATIENT
Start: 2019-03-22 | End: 2019-09-27 | Stop reason: SDUPTHER

## 2019-03-22 RX ORDER — CLOPIDOGREL BISULFATE 75 MG/1
75 TABLET ORAL DAILY
Qty: 90 TABLET | Refills: 3 | Status: SHIPPED | OUTPATIENT
Start: 2019-03-22 | End: 2019-09-27 | Stop reason: SDUPTHER

## 2019-03-22 RX ORDER — FUROSEMIDE 40 MG/1
40 TABLET ORAL 2 TIMES DAILY
Qty: 180 TABLET | Refills: 3 | Status: SHIPPED | OUTPATIENT
Start: 2019-03-22 | End: 2019-09-27 | Stop reason: SDUPTHER

## 2019-03-22 RX ORDER — AMIODARONE HYDROCHLORIDE 200 MG/1
200 TABLET ORAL DAILY
Qty: 90 TABLET | Refills: 3 | Status: SHIPPED | OUTPATIENT
Start: 2019-03-22 | End: 2019-09-27 | Stop reason: SDUPTHER

## 2019-03-22 RX ORDER — AMLODIPINE BESYLATE 2.5 MG/1
2.5 TABLET ORAL DAILY PRN
COMMUNITY
End: 2019-09-27 | Stop reason: SDUPTHER

## 2019-03-22 RX ORDER — POTASSIUM CHLORIDE 750 MG/1
TABLET, FILM COATED, EXTENDED RELEASE ORAL
Qty: 180 TABLET | Refills: 5 | Status: SHIPPED | OUTPATIENT
Start: 2019-03-22 | End: 2019-09-27 | Stop reason: SDUPTHER

## 2019-03-22 RX ORDER — BUPRENORPHINE HYDROCHLORIDE AND NALOXONE HYDROCHLORIDE DIHYDRATE 8; 2 MG/1; MG/1
1 TABLET SUBLINGUAL 3 TIMES DAILY
COMMUNITY

## 2019-03-22 RX ORDER — LISINOPRIL 5 MG/1
10 TABLET ORAL DAILY
Qty: 180 TABLET | Refills: 3 | Status: SHIPPED | OUTPATIENT
Start: 2019-03-22 | End: 2019-09-27 | Stop reason: SDUPTHER

## 2019-03-22 NOTE — PATIENT INSTRUCTIONS
Steps to Quit Smoking  Smoking tobacco can be bad for your health. It can also affect almost every organ in your body. Smoking puts you and people around you at risk for many serious long-lasting (chronic) diseases. Quitting smoking is hard, but it is one of the best things that you can do for your health. It is never too late to quit.  What are the benefits of quitting smoking?  When you quit smoking, you lower your risk for getting serious diseases and conditions. They can include:  · Lung cancer or lung disease.  · Heart disease.  · Stroke.  · Heart attack.  · Not being able to have children (infertility).  · Weak bones (osteoporosis) and broken bones (fractures).    If you have coughing, wheezing, and shortness of breath, those symptoms may get better when you quit. You may also get sick less often. If you are pregnant, quitting smoking can help to lower your chances of having a baby of low birth weight.  What can I do to help me quit smoking?  Talk with your doctor about what can help you quit smoking. Some things you can do (strategies) include:  · Quitting smoking totally, instead of slowly cutting back how much you smoke over a period of time.  · Going to in-person counseling. You are more likely to quit if you go to many counseling sessions.  · Using resources and support systems, such as:  ? Online chats with a counselor.  ? Phone quitlines.  ? Printed self-help materials.  ? Support groups or group counseling.  ? Text messaging programs.  ? Mobile phone apps or applications.  · Taking medicines. Some of these medicines may have nicotine in them. If you are pregnant or breastfeeding, do not take any medicines to quit smoking unless your doctor says it is okay. Talk with your doctor about counseling or other things that can help you.    Talk with your doctor about using more than one strategy at the same time, such as taking medicines while you are also going to in-person counseling. This can help make  quitting easier.  What things can I do to make it easier to quit?  Quitting smoking might feel very hard at first, but there is a lot that you can do to make it easier. Take these steps:  · Talk to your family and friends. Ask them to support and encourage you.  · Call phone quitlines, reach out to support groups, or work with a counselor.  · Ask people who smoke to not smoke around you.  · Avoid places that make you want (trigger) to smoke, such as:  ? Bars.  ? Parties.  ? Smoke-break areas at work.  · Spend time with people who do not smoke.  · Lower the stress in your life. Stress can make you want to smoke. Try these things to help your stress:  ? Getting regular exercise.  ? Deep-breathing exercises.  ? Yoga.  ? Meditating.  ? Doing a body scan. To do this, close your eyes, focus on one area of your body at a time from head to toe, and notice which parts of your body are tense. Try to relax the muscles in those areas.  · Download or buy apps on your mobile phone or tablet that can help you stick to your quit plan. There are many free apps, such as QuitGuide from the CDC (Centers for Disease Control and Prevention). You can find more support from smokefree.gov and other websites.    This information is not intended to replace advice given to you by your health care provider. Make sure you discuss any questions you have with your health care provider.  Document Released: 10/14/2010 Document Revised: 08/15/2017 Document Reviewed: 05/03/2016  [employer] Interactive Patient Education © 2019 [employer]  Fat and Cholesterol Restricted Eating Plan  Getting too much fat and cholesterol in your diet may cause health problems. Choosing the right foods helps keep your fat and cholesterol at normal levels. This can keep you from getting certain diseases.  Your doctor may recommend an eating plan that includes:  · Total fat: ______% or less of total calories a day.  · Saturated fat: ______% or less of total calories a  "day.  · Cholesterol: less than _________mg a day.  · Fiber: ______g a day.    What are tips for following this plan?  General tips  · Work with your doctor to lose weight if you need to.  · Avoid:  ? Foods with added sugar.  ? Fried foods.  ? Foods with partially hydrogenated oils.  · Limit alcohol intake to no more than 1 drink a day for nonpregnant women and 2 drinks a day for men. One drink equals 12 oz of beer, 5 oz of wine, or 1½ oz of hard liquor.  Reading food labels  · Check food labels for:  ? Trans fats.  ? Partially hydrogenated oils.  ? Saturated fat (g) in each serving.  ? Cholesterol (mg) in each serving.  ? Fiber (g) in each serving.  · Choose foods with healthy fats, such as:  ? Monounsaturated fats.  ? Polyunsaturated fats.  ? Omega-3 fats.  · Choose grain products that have whole grains. Look for the word \"whole\" as the first word in the ingredient list.  Cooking  · Cook foods using low-fat methods. These include baking, boiling, grilling, and broiling.  · Eat more home-cooked foods. Eat at restaurants and buffets less often.  · Avoid cooking using saturated fats, such as butter, cream, palm oil, palm kernel oil, and coconut oil.  Meal planning  · At meals, divide your plate into four equal parts:  ? Fill one-half of your plate with vegetables and green salads.  ? Fill one-fourth of your plate with whole grains.  ? Fill one-fourth of your plate with low-fat (lean) protein foods.  · Eat fish that is high in omega-3 fats at least two times a week. This includes mackerel, tuna, sardines, and salmon.  · Eat foods that are high in fiber, such as whole grains, beans, apples, broccoli, carrots, peas, and barley.  Recommended foods  Grains  · Whole grains, such as whole wheat or whole grain breads, crackers, cereals, and pasta. Unsweetened oatmeal, bulgur, barley, quinoa, or brown rice. Corn or whole wheat flour tortillas.  Vegetables  · Fresh or frozen vegetables (raw, steamed, roasted, or grilled). Green " salads.  Fruits  · All fresh, canned (in natural juice), or frozen fruits.  Meats and other protein foods  · Ground beef (85% or leaner), grass-fed beef, or beef trimmed of fat. Skinless chicken or turkey. Ground chicken or turkey. Pork trimmed of fat. All fish and seafood. Egg whites. Dried beans, peas, or lentils. Unsalted nuts or seeds. Unsalted canned beans. Nut butters without added sugar or oil.  Dairy  · Low-fat or nonfat dairy products, such as skim or 1% milk, 2% or reduced-fat cheeses, low-fat and fat-free ricotta or cottage cheese, or plain low-fat and nonfat yogurt.  Fats and oils  · Tub margarine without trans fats. Light or reduced-fat mayonnaise and salad dressings. Avocado. Olive, canola, sesame, or safflower oils.  The items listed above may not be a complete list of recommended foods or beverages. Contact your dietitian for more options.  Foods to avoid  Grains  · White bread. White pasta. White rice. Cornbread. Bagels, pastries, and croissants. Crackers and snack foods that contain trans fat and hydrogenated oils.  Vegetables  · Vegetables cooked in cheese, cream, or butter sauce. Fried vegetables.  Fruits  · Canned fruit in heavy syrup. Fruit in cream or butter sauce. Fried fruit.  Meats and other protein foods  · Fatty cuts of meat. Ribs, chicken wings, collins, sausage, bologna, salami, chitterlings, fatback, hot dogs, bratwurst, and packaged lunch meats. Liver and organ meats. Whole eggs and egg yolks. Chicken and turkey with skin. Fried meat.  Dairy  · Whole or 2% milk, cream, half-and-half, and cream cheese. Whole milk cheeses. Whole-fat or sweetened yogurt. Full-fat cheeses. Nondairy creamers and whipped toppings. Processed cheese, cheese spreads, and cheese curds.  Beverages  · Alcohol. Sugar-sweetened drinks such as sodas, lemonade, and fruit drinks.  Fats and oils  · Butter, stick margarine, lard, shortening, ghee, or collins fat. Coconut, palm kernel, and palm oils.  Sweets and  desserts  · Corn syrup, sugars, honey, and molasses. Candy. Jam and jelly. Syrup. Sweetened cereals. Cookies, pies, cakes, donuts, muffins, and ice cream.  The items listed above may not be a complete list of foods and beverages to avoid. Contact your dietitian for more information.  Summary  · Choosing the right foods helps keep your fat and cholesterol at normal levels. This can keep you from getting certain diseases.  · At meals, fill one-half of your plate with vegetables and green salads.  · Eat high-fiber foods, like whole grains, beans, apples, carrots, peas, and barley.  · Limit added sugar, saturated fats, alcohol, and fried foods.  This information is not intended to replace advice given to you by your health care provider. Make sure you discuss any questions you have with your health care provider.  Document Released: 06/18/2013 Document Revised: 09/04/2018 Document Reviewed: 09/04/2018  [employer] Interactive Patient Education © 2019 [employer]  BMI for Adults  Body mass index (BMI) is a number that is calculated from a person's weight and height. In most adults, the number is used to find how much of an adult's weight is made up of fat. BMI is not as accurate as a direct measure of body fat.  How is BMI calculated?  BMI is calculated by dividing weight in kilograms by height in meters squared. It can also be calculated by dividing weight in pounds by height in inches squared, then multiplying the resulting number by 703. Charts are available to help you find your BMI quickly and easily without doing this calculation.  How is BMI interpreted?  Health care professionals use BMI charts to identify whether an adult is underweight, at a normal weight, or overweight based on the following guidelines:  · Underweight: BMI less than 18.5.  · Normal weight: BMI between 18.5 and 24.9.  · Overweight: BMI between 25 and 29.9.  · Obese: BMI of 30 and above.    BMI is usually interpreted the same for males and  females.  Weight includes both fat and muscle, so someone with a muscular build, such as an athlete, may have a BMI that is higher than 24.9. In cases like these, BMI may not accurately depict body fat. To determine if excess body fat is the cause of a BMI of 25 or higher, further assessments may need to be done by a health care provider.  Why is BMI a useful tool?  BMI is used to identify a possible weight problem that may be related to a medical problem or may increase the risk for medical problems. BMI can also be used to promote changes to reach a healthy weight.  This information is not intended to replace advice given to you by your health care provider. Make sure you discuss any questions you have with your health care provider.  Document Released: 08/29/2005 Document Revised: 04/27/2017 Document Reviewed: 05/15/2015  [employer] Interactive Patient Education © 2018 [employer]    Coronary Artery Disease, Male  Coronary artery disease (CAD) is a condition in which the arteries that lead to the heart (coronary arteries) become narrow or blocked. The narrowing or blockage can lead to decreased blood flow to the heart. Prolonged reduced blood flow can cause a heart attack (myocardial infarction or MI). This condition may also be called coronary heart disease.  Because CAD is the leading cause of death in men, it is important to understand what causes this condition and how it is treated.  What are the causes?  CAD is most often caused by atherosclerosis. This is the buildup of fat and cholesterol (plaque) on the inside of the arteries. Over time, the plaque may narrow or block the artery, reducing blood flow to the heart. Plaque can also become weak and break off within a coronary artery and cause a sudden blockage. Other less common causes of CAD include:  An embolism or blood clot in a coronary artery.  A tearing of the artery (spontaneous coronary artery dissection).  An aneurysm.  Inflammation (vasculitis) in  the artery wall.    What increases the risk?  The following factors may make you more likely to develop this condition:  Age. Men over age 45 are at a greater risk of CAD.  Family history of CAD.  Gender. Men often develop CAD earlier in life than women.  High blood pressure (hypertension).  Diabetes.  High cholesterol levels.  Tobacco use.  Excessive alcohol use.  Lack of exercise.  A diet high in saturated and trans fats, such as fried food and processed meat.    Other possible risk factors include:  High stress levels.  Depression.  Obesity.  Sleep apnea.    What are the signs or symptoms?  Many people do not have any symptoms during the early stages of CAD. As the condition progresses, symptoms may include:  Chest pain (angina). The pain can:  Feel like a crushing or squeezing, or a tightness, pressure, fullness, or heaviness in the chest.  Last more than a few minutes or can stop and recur. The pain tends to get worse with exercise or stress and to fade with rest.  Pain in the arms, neck, jaw, or back.  Unexplained heartburn or indigestion.  Shortness of breath.  Nausea or vomiting.  Sudden light-headedness.  Sudden cold sweats.  Fluttering or fast heartbeat (palpitations).    How is this diagnosed?  This condition is diagnosed based on:  Your family and medical history.  A physical exam.  Tests, including:  A test to check the electrical signals in your heart (electrocardiogram).  Exercise stress test. This looks for signs of blockage when the heart is stressed with exercise, such as running on a treadmill.  Pharmacologic stress test. This test looks for signs of blockage when the heart is being stressed with a medicine.  Blood tests.  Coronary angiogram. This is a procedure to look at the coronary arteries to see if there is any blockage. During this test, a dye is injected into your arteries so they appear on an X-ray.  A test that uses sound waves to take a picture of your heart (echocardiogram).  Chest  X-ray.    How is this treated?  This condition may be treated by:  Healthy lifestyle changes to reduce risk factors.  Medicines such as:  Antiplatelet medicines and blood-thinning medicines, such as aspirin. These help to prevent blood clots.  Nitroglycerin.  Blood pressure medicines.  Cholesterol-lowering medicine.  Coronary angioplasty and stenting. During this procedure, a thin, flexible tube is inserted through a blood vessel and into a blocked artery. A balloon or similar device on the end of the tube is inflated to open up the artery. In some cases, a small, mesh tube (stent) is inserted into the artery to keep it open.  Coronary artery bypass surgery. During this surgery, veins or arteries from other parts of the body are used to create a bypass around the blockage and allow blood to reach your heart.    Follow these instructions at home:  Medicines  Take over-the-counter and prescription medicines only as told by your health care provider.  Do not take the following medicines unless your health care provider approves:  NSAIDs, such as ibuprofen, naproxen, or celecoxib.  Vitamin supplements that contain vitamin A, vitamin E, or both.  Lifestyle  Follow an exercise program approved by your health care provider. Aim for 150 minutes of moderate exercise or 75 minutes of vigorous exercise each week.  Maintain a healthy weight or lose weight as approved by your health care provider.  Rest when you are tired.  Learn to manage stress or try to limit your stress. Ask your health care provider for suggestions if you need help.  Get screened for depression and seek treatment, if needed.  Do not use any products that contain nicotine or tobacco, such as cigarettes and e-cigarettes. If you need help quitting, ask your health care provider.  Do not use illegal drugs.  Eating and drinking  Follow a heart-healthy diet. A dietitian can help educate you about healthy food options and changes. In general, eat plenty of fruits  and vegetables, lean meats, and whole grains.  Avoid foods high in:  Sugar.  Salt (sodium).  Saturated fat, such as processed or fatty meat.  Trans fat, such as fried foods.  Use healthy cooking methods such as roasting, grilling, broiling, baking, poaching, steaming, or stir-frying.  If you drink alcohol, and your health care provider approves, limit your alcohol intake to no more than 2 drinks per day. One drink equals 12 ounces of beer, 5 ounces of wine, or 1½ ounces of hard liquor.  General instructions  Manage any other health conditions, such as hypertension and diabetes. These conditions affect your heart.  Your health care provider may ask you to monitor your blood pressure. Ideally, your blood pressure should be below 130/80.  Keep all follow-up visits as told by your health care provider. This is important.  Get help right away if:  You have pain in your chest, neck, arm, jaw, stomach, or back that:  Lasts more than a few minutes.  Is recurring.  Is not relieved by taking medicine under your tongue (sublingualnitroglycerin).  You have too much (profuse) sweating without cause.  You have unexplained:  Heartburn or indigestion.  Shortness of breath or difficulty breathing.  Fluttering or fast heartbeat (palpitations).  Nausea or vomiting.  Fatigue.  Feelings of nervousness or anxiety.  Weakness.  Diarrhea.  You have sudden light-headedness or dizziness.  You faint.  You feel like hurting yourself or think about taking your own life.  These symptoms may represent a serious problem that is an emergency. Do not wait to see if the symptoms will go away. Get medical help right away. Call your local emergency services (911 in the [place] Do not drive yourself to the hospital.  Summary  Coronary artery disease (CAD) is a process in which the arteries that lead to the heart (coronary arteries) become narrow or blocked. The narrowing or blockage can lead to a heart attack.  Many people do not have any symptoms during  "the early stages of CAD. This is called \"silent CAD.\"  CAD can be treated with lifestyle changes, medicines, surgery, or a combination of these treatments.  This information is not intended to replace advice given to you by your health care provider. Make sure you discuss any questions you have with your health care provider.  Document Released: 07/15/2015 Document Revised: 12/08/2017 Document Reviewed: 12/08/2017  [employer] Interactive Patient Education © 2019 [employer]    "

## 2019-03-22 NOTE — PROGRESS NOTES
Chito Nuñez is a 73 y.o. male     Chief Complaint   Patient presents with   • Follow-up     Here for a 3 month follow up        HPI    Problem List:    1. CAD  1.1 [facility] 2012 - LT Main 10-20%; LAD 40-50%; Circ 100%; RCA 20-30% RCA and 30-40%; medical management   1.2 Stress Test 1/11/17 - Anterior ischemia; cannot rule out inferior as well; decreased LVEF; intermediate risk   1.3 [facility] 1/30/17 - Stent to RCA; EF 20-25%  2. Hypertension  3. Atrial Flutter Paroxysmal CHADS 2  3.1 No anticoagulation due to GIB   3.2 Event Monitor 12/15-12/28/16 - Atrial Fib, atrial flutter, Pacemaker tachycardia   4. 2:1 AVB with presyncope/Bradycardia symptomatic   4.1 Dual chamber PPM Guidant 10/8/14  5. Dyslipidemia   6. Shortness of breath  6.1 Echo 3/11/14 - mod LVH; EF 40-50%; mild left ventricular hypokinesis; mild MR; PA 36  6.2 Echo 1/11/17 - mild LVH; EF 35-40%; DD II; mild MR and TR; PA 35-40  6.3 Echo 5/24/18-mild LVH, EF 35-40%, diastolic dysfunction 1, trace MR, mild dilation of aortic root  7. Smoking Habituation   8. Cardiomyopathy   8.1 MUGA 3/22/17 - EF 40%    Patient is a 73-year-old male who presents today for a follow-up.  He states that he will have he tries to lay flat he will have some chest tightness.  He says he has no other symptoms when this occurs.  He denies any palpitations, fluttering, dizziness, presyncope, syncope, orthopnea, PND or edema.  He says he does get short of breath with activity.  He says he had been on oxycodone for years and finally got put on Suboxone and says he is felt the best he has felt in a very long time.  He is smoking 3 cigarettes/day.    Patient had one episode of nonsustained V. tach today on device check however he does not want to have any workup at this time.  He will get TSH, magnesium and thyroid however.    Current Outpatient Medications   Medication Sig Dispense Refill   • albuterol (PROAIR HFA) 108 (90 BASE) MCG/ACT inhaler ProAir  (90 Base) MCG/ACT  Inhalation Aerosol Solution; Patient Sig: ProAir  (90 Base) MCG/ACT Inhalation Aerosol Solution INHALE 1 TO 2 PUFFS EVERY 4 TO 6 HOURS AS NEEDED.; 0; 23-Jul-2013; Active     • amiodarone (PACERONE) 200 MG tablet Take 1 tablet by mouth Daily. 90 tablet 3   • amLODIPine (NORVASC) 2.5 MG tablet Take 2.5 mg by mouth Daily As Needed.     • aspirin 81 MG tablet Take 1 tablet by mouth Daily. 90 tablet 3   • buprenorphine-naloxone (SUBOXONE) 8-2 MG per SL tablet Place 1 tablet under the tongue 3 (Three) Times a Day.     • carvedilol (COREG) 6.25 MG tablet Take 1 tablet by mouth 2 (Two) Times a Day With Meals. 180 tablet 3   • clopidogrel (PLAVIX) 75 MG tablet Take 1 tablet by mouth Daily. 90 tablet 3   • COMBIVENT RESPIMAT  MCG/ACT inhaler Inhale 1 puff Daily.     • furosemide (LASIX) 40 MG tablet Take 1 tablet by mouth 2 (Two) Times a Day. 180 tablet 3   • gabapentin (NEURONTIN) 800 MG tablet Take 800 mg by mouth 3 (Three) Times a Day As Needed.     • ipratropium-albuterol (DUO-NEB) 0.5-2.5 mg/mL nebulizer Ipratropium-Albuterol 0.5-2.5 (3) MG/3ML Inhalation Solution; Patient Sig: Ipratropium-Albuterol 0.5-2.5 (3) MG/3ML Inhalation Solution USE 1 UNIT DOSE IN NEBULIZER EVERY 4 HOURS AS NEEDED.; 0; 23-Jul-2013; Active     • lansoprazole (PREVACID) 30 MG capsule Take 30 mg by mouth Daily.     • lisinopril (PRINIVIL,ZESTRIL) 5 MG tablet Take 2 tablets by mouth Daily. 180 tablet 3   • methocarbamol (ROBAXIN) 750 MG tablet Take 1 tablet by mouth 4 (Four) Times a Day As Needed.     • polyethylene glycol (MIRALAX) powder Take  by mouth Daily.     • potassium chloride (K-DUR) 10 MEQ CR tablet Take 20 Meq daily and then take additional 20 meq with additional lasix 180 tablet 5   • simvastatin (ZOCOR) 10 MG tablet Take 1 tablet by mouth Every Evening. 90 tablet 3   • SYMBICORT 160-4.5 MCG/ACT inhaler Inhale 2 puffs Take As Directed.     • tamsulosin (FLOMAX) 0.4 MG capsule 24 hr capsule Take 1 capsule by mouth Daily. 30  capsule 0     No current facility-administered medications for this visit.        ALLERGIES    Patient has no known allergies.    Past Medical History:   Diagnosis Date   • 2nd degree atrioventricular block 9/19/2016   • Acute renal insufficiency    • Anxiety    • Arthritis    • Asthma    • Atrial fibrillation (CMS/[facility]) 9/19/2016   • Benign prostatic hypertrophy with urinary obstruction 9/19/2016   • Bradycardia 9/19/2016   • CAD (coronary artery disease), native coronary artery 9/19/2016   • Chest pain 9/19/2016   • Congestive heart failure (CMS/[facility]) 9/19/2016   • COPD (chronic obstructive pulmonary disease) (CMS/[facility])    • Degeneration of cervical intervertebral disc    • Depression    • Dyslipidemia 9/19/2016   • Hiatal hernia    • Hyperlipidemia    • Hypertension    • Hypogonadism male 9/19/2016   • Incomplete emptying of bladder 9/19/2016   • Male erectile disorder of organic origin 9/19/2016   • Myocardial infarction (CMS/[facility])    • Sleep apnea    • Thrombocytopenia (CMS/[facility]) 9/19/2016   • Urinary hesitancy 9/19/2016       Social History     Socioeconomic History   • Marital status: Single     Spouse name: Not on file   • Number of children: Not on file   • Years of education: Not on file   • Highest education level: Not on file   Tobacco Use   • Smoking status: Current Every Day Smoker     Packs/day: 0.50     Types: Cigarettes   • Smokeless tobacco: Never Used   Substance and Sexual Activity   • Alcohol use: No   • Drug use: No   • Sexual activity: Defer       Family History   Problem Relation Age of Onset   • Diabetes Son    • No Known Problems Mother    • No Known Problems Father        Review of Systems   Constitutional: Negative for chills, fatigue and fever.   HENT: Positive for congestion (Seasonal ). Negative for rhinorrhea and sore throat.    Eyes: Positive for visual disturbance (Glasses daily ).   Respiratory: Positive for chest tightness (Occasional fleeting tightness when laying down ) and shortness  "of breath (SOA with exertion ).         Has oxygen at home to use prn    Cardiovascular: Negative for chest pain, palpitations and leg swelling.   Gastrointestinal: Negative for abdominal pain, nausea and vomiting.   Endocrine: Negative for cold intolerance and heat intolerance.   Genitourinary: Positive for difficulty urinating (Sometimes has difficulty starting stream ). Negative for dysuria, frequency and urgency.   Musculoskeletal: Positive for arthralgias (Joints ) and back pain (Upper and lower back ).   Skin: Negative.  Negative for rash and wound.   Allergic/Immunologic: Positive for environmental allergies (Seasonal ).   Neurological: Positive for light-headedness (When BP gets too low ). Negative for dizziness and weakness.   Hematological: Bruises/bleeds easily (Bruises ).   Psychiatric/Behavioral: Negative for sleep disturbance (Denies waking with smothering or SOA).       Objective   BP 92/53 (BP Location: Left arm, Patient Position: Sitting)   Pulse 73   Ht 175.3 cm (69\")   Wt 86.7 kg (191 lb 3.2 oz)   SpO2 94%   BMI 28.24 kg/m²   Vitals:    03/22/19 1031   BP: 92/53   BP Location: Left arm   Patient Position: Sitting   Pulse: 73   SpO2: 94%   Weight: 86.7 kg (191 lb 3.2 oz)   Height: 175.3 cm (69\")      Lab Results (most recent)     None        Physical Exam   Constitutional: He is oriented to person, place, and time. Vital signs are normal. He appears well-developed and well-nourished. He is active and cooperative.   HENT:   Head: Normocephalic.   Mouth/Throat: Abnormal dentition (edentulous ).   Eyes: Lids are normal.   Wears glasses   Neck: Normal carotid pulses, no hepatojugular reflux and no JVD present. Carotid bruit is not present.   Cardiovascular: Normal rate, regular rhythm and normal heart sounds.   Pulses:       Radial pulses are 2+ on the right side, and 2+ on the left side.        Dorsalis pedis pulses are 2+ on the right side, and 2+ on the left side.        Posterior tibial pulses " are 2+ on the right side, and 2+ on the left side.   No edema BLE.    Pulmonary/Chest: Effort normal. He has wheezes (expiration ) in the right upper field, the right middle field, the right lower field, the left upper field, the left middle field and the left lower field.   Abdominal: Normal appearance and bowel sounds are normal.   Neurological: He is alert and oriented to person, place, and time.   Skin: Skin is warm, dry and intact.   PPM STEPH   Psychiatric: He has a normal mood and affect. His speech is normal and behavior is normal. Judgment and thought content normal. Cognition and memory are normal.       Procedure     ECG 12 Lead  Date/Time: 3/22/2019 11:50 AM  Performed by: Radha Rajput APRN  Authorized by: Radha Rajput APRN   Comparison: compared with previous ECG from 7/3/2018  Rhythm: paced  Rate: normal  BPM: 70    Clinical impression: abnormal EKG  Comments: QT/QTc 457/477                 Assessment/Plan      Diagnosis Plan   1. Coronary artery disease involving native coronary artery of native heart with angina pectoris (CMS/HCC)  carvedilol (COREG) 6.25 MG tablet    aspirin 81 MG tablet    clopidogrel (PLAVIX) 75 MG tablet    Lipid Panel    Comprehensive Metabolic Panel   2. Paroxysmal atrial fibrillation (CMS/HCC)  amiodarone (PACERONE) 200 MG tablet    Basic Metabolic Panel    Magnesium   3. Systolic congestive heart failure, unspecified HF chronicity (CMS/HCC)     4. Essential hypertension  lisinopril (PRINIVIL,ZESTRIL) 5 MG tablet    Comprehensive Metabolic Panel   5. Ischemic cardiomyopathy  potassium chloride (K-DUR) 10 MEQ CR tablet   6. Pacemaker     7. Dyslipidemia  simvastatin (ZOCOR) 10 MG tablet    Lipid Panel   8. Smoking     9. Cardiomyopathy, unspecified type (CMS/HCC)  furosemide (LASIX) 40 MG tablet   10. Healthcare maintenance  Basic Metabolic Panel    Magnesium    TSH    CBC & Differential    Lipid Panel    Comprehensive Metabolic Panel   11. NSVT (nonsustained ventricular  tachycardia) (CMS/[facility])  Basic Metabolic Panel    Magnesium    TSH   12. Palpitations  TSH   13. Anemia, unspecified type  CBC & Differential       Return in about 3 months (around 6/22/2019).    CAD-patient is on aspirin, beta and statin.  Paroxysmal A. fib-patient is on aspirin, Plavix, amiodarone and beta.  Systolic heart failure-patient is on beta, ACE and diuretic.  Hypertension-patient doing well adjust his medications according on his blood pressure.  Permanent pacemaker-she one episode of nonsustained V. tach.  We will get a BMP, magnesium and TSH.  Patient does not want to get a workup at this time he is on beta and anemia.  Cardiomyopathy-patient is on beta, ACE and diuretic.  He will continue his medication regimen.  He will follow-up in 3 months or sooner if any changes.  He is getting get lipid, CMP, TSH, magnesium and CBC.  Chest tightness-he can use nitro as needed for chest pain no resolution he is to go to the ER.  He is not wanting a workup.       I advised Kandice of the risks of continuing to use tobacco, and I provided him with tobacco cessation educational materials in the After Visit Summary.     During this visit, I spent >3 minutes counseling the patient regarding tobacco cessation.    Patient's Body mass index is 28.24 kg/m². BMI is above normal parameters. Recommendations include: educational material and referral to primary care.      Electronically signed by:

## 2019-03-25 ENCOUNTER — TELEPHONE (OUTPATIENT)
Dept: CARDIOLOGY | Facility: CLINIC | Age: 74
End: 2019-03-25

## 2019-03-25 NOTE — TELEPHONE ENCOUNTER
----- Message from MARCELA Escobedo sent at 3/25/2019  2:00 PM EDT -----  Please advise patient, appears anemic.  Make sure copy goes to his PCP, that he follows up with them and also he wants a copy mailed to himself as well.    Thanks!

## 2019-03-25 NOTE — TELEPHONE ENCOUNTER
Routed results to pt's PCP. Mailed a copy to the address we have on file.         Informed pt of his results, stated that I sent a copy to his PCP and his home address, he verbalized understanding.

## 2019-07-13 NOTE — PROGRESS NOTES
Pacer check.    Temp 101.5, tylenol given. OVSS. No signs of pain. Intermittent nausea but declined PRN. Eating better today, drinking well. Voiding normally. Several loose, water stools. Mom at bedside. Hourlr rounding done. Continue POC.

## 2019-09-27 ENCOUNTER — OFFICE VISIT (OUTPATIENT)
Dept: CARDIOLOGY | Facility: CLINIC | Age: 74
End: 2019-09-27

## 2019-09-27 VITALS
HEART RATE: 71 BPM | DIASTOLIC BLOOD PRESSURE: 83 MMHG | BODY MASS INDEX: 27.78 KG/M2 | WEIGHT: 187.6 LBS | OXYGEN SATURATION: 99 % | HEIGHT: 69 IN | SYSTOLIC BLOOD PRESSURE: 134 MMHG

## 2019-09-27 DIAGNOSIS — Z95.0 PACEMAKER: ICD-10-CM

## 2019-09-27 DIAGNOSIS — I25.119 CORONARY ARTERY DISEASE INVOLVING NATIVE CORONARY ARTERY OF NATIVE HEART WITH ANGINA PECTORIS (HCC): Primary | ICD-10-CM

## 2019-09-27 DIAGNOSIS — I10 ESSENTIAL HYPERTENSION: ICD-10-CM

## 2019-09-27 DIAGNOSIS — I48.0 PAROXYSMAL ATRIAL FIBRILLATION (HCC): Primary | ICD-10-CM

## 2019-09-27 DIAGNOSIS — E78.5 DYSLIPIDEMIA: ICD-10-CM

## 2019-09-27 DIAGNOSIS — R42 DIZZINESS: ICD-10-CM

## 2019-09-27 DIAGNOSIS — I25.5 ISCHEMIC CARDIOMYOPATHY: ICD-10-CM

## 2019-09-27 DIAGNOSIS — R06.02 SHORTNESS OF BREATH: ICD-10-CM

## 2019-09-27 DIAGNOSIS — I48.0 PAROXYSMAL ATRIAL FIBRILLATION (HCC): ICD-10-CM

## 2019-09-27 DIAGNOSIS — I42.9 CARDIOMYOPATHY, UNSPECIFIED TYPE (HCC): ICD-10-CM

## 2019-09-27 PROCEDURE — 93000 ELECTROCARDIOGRAM COMPLETE: CPT | Performed by: NURSE PRACTITIONER

## 2019-09-27 PROCEDURE — 93280 PM DEVICE PROGR EVAL DUAL: CPT | Performed by: INTERNAL MEDICINE

## 2019-09-27 PROCEDURE — 99214 OFFICE O/P EST MOD 30 MIN: CPT | Performed by: NURSE PRACTITIONER

## 2019-09-27 RX ORDER — POTASSIUM CHLORIDE 750 MG/1
TABLET, FILM COATED, EXTENDED RELEASE ORAL
Qty: 180 TABLET | Refills: 5 | Status: SHIPPED | OUTPATIENT
Start: 2019-09-27 | End: 2020-08-28 | Stop reason: SDUPTHER

## 2019-09-27 RX ORDER — CLOPIDOGREL BISULFATE 75 MG/1
75 TABLET ORAL DAILY
Qty: 90 TABLET | Refills: 3 | Status: SHIPPED | OUTPATIENT
Start: 2019-09-27 | End: 2020-08-28 | Stop reason: SDUPTHER

## 2019-09-27 RX ORDER — FUROSEMIDE 40 MG/1
40 TABLET ORAL 2 TIMES DAILY
Qty: 180 TABLET | Refills: 3 | Status: SHIPPED | OUTPATIENT
Start: 2019-09-27 | End: 2020-08-28 | Stop reason: SDUPTHER

## 2019-09-27 RX ORDER — SIMVASTATIN 10 MG
10 TABLET ORAL EVERY EVENING
Qty: 90 TABLET | Refills: 3 | Status: SHIPPED | OUTPATIENT
Start: 2019-09-27 | End: 2020-08-28 | Stop reason: SDUPTHER

## 2019-09-27 RX ORDER — AMIODARONE HYDROCHLORIDE 200 MG/1
200 TABLET ORAL DAILY
Qty: 90 TABLET | Refills: 3 | Status: SHIPPED | OUTPATIENT
Start: 2019-09-27 | End: 2020-08-28 | Stop reason: SDUPTHER

## 2019-09-27 RX ORDER — LISINOPRIL 5 MG/1
10 TABLET ORAL DAILY
Qty: 180 TABLET | Refills: 3 | Status: SHIPPED | OUTPATIENT
Start: 2019-09-27 | End: 2020-08-28 | Stop reason: SDUPTHER

## 2019-09-27 RX ORDER — CARVEDILOL 6.25 MG/1
6.25 TABLET ORAL 2 TIMES DAILY WITH MEALS
Qty: 180 TABLET | Refills: 3 | Status: SHIPPED | OUTPATIENT
Start: 2019-09-27 | End: 2020-08-28 | Stop reason: SDUPTHER

## 2019-09-27 RX ORDER — AMLODIPINE BESYLATE 2.5 MG/1
2.5 TABLET ORAL DAILY PRN
Qty: 90 TABLET | Refills: 3 | Status: SHIPPED | OUTPATIENT
Start: 2019-09-27 | End: 2020-08-28 | Stop reason: SDUPTHER

## 2019-09-27 NOTE — PATIENT INSTRUCTIONS
"Fat and Cholesterol Restricted Eating Plan  Getting too much fat and cholesterol in your diet may cause health problems. Choosing the right foods helps keep your fat and cholesterol at normal levels. This can keep you from getting certain diseases.  Your doctor may recommend an eating plan that includes:  · Total fat: ______% or less of total calories a day.  · Saturated fat: ______% or less of total calories a day.  · Cholesterol: less than _________mg a day.  · Fiber: ______g a day.  What are tips for following this plan?  General tips    · Work with your doctor to lose weight if you need to.  · Avoid:  ? Foods with added sugar.  ? Fried foods.  ? Foods with partially hydrogenated oils.  · Limit alcohol intake to no more than 1 drink a day for nonpregnant women and 2 drinks a day for men. One drink equals 12 oz of beer, 5 oz of wine, or 1½ oz of hard liquor.  Reading food labels  · Check food labels for:  ? Trans fats.  ? Partially hydrogenated oils.  ? Saturated fat (g) in each serving.  ? Cholesterol (mg) in each serving.  ? Fiber (g) in each serving.  · Choose foods with healthy fats, such as:  ? Monounsaturated fats.  ? Polyunsaturated fats.  ? Omega-3 fats.  · Choose grain products that have whole grains. Look for the word \"whole\" as the first word in the ingredient list.  Cooking  · Cook foods using low-fat methods. These include baking, boiling, grilling, and broiling.  · Eat more home-cooked foods. Eat at restaurants and buffets less often.  · Avoid cooking using saturated fats, such as butter, cream, palm oil, palm kernel oil, and coconut oil.  Meal planning    · At meals, divide your plate into four equal parts:  ? Fill one-half of your plate with vegetables and green salads.  ? Fill one-fourth of your plate with whole grains.  ? Fill one-fourth of your plate with low-fat (lean) protein foods.  · Eat fish that is high in omega-3 fats at least two times a week. This includes mackerel, tuna, sardines, and " salmon.  · Eat foods that are high in fiber, such as whole grains, beans, apples, broccoli, carrots, peas, and barley.  Recommended foods  Grains  · Whole grains, such as whole wheat or whole grain breads, crackers, cereals, and pasta. Unsweetened oatmeal, bulgur, barley, quinoa, or brown rice. Corn or whole wheat flour tortillas.  Vegetables  · Fresh or frozen vegetables (raw, steamed, roasted, or grilled). Green salads.  Fruits  · All fresh, canned (in natural juice), or frozen fruits.  Meats and other protein foods  · Ground beef (85% or leaner), grass-fed beef, or beef trimmed of fat. Skinless chicken or turkey. Ground chicken or turkey. Pork trimmed of fat. All fish and seafood. Egg whites. Dried beans, peas, or lentils. Unsalted nuts or seeds. Unsalted canned beans. Nut butters without added sugar or oil.  Dairy  · Low-fat or nonfat dairy products, such as skim or 1% milk, 2% or reduced-fat cheeses, low-fat and fat-free ricotta or cottage cheese, or plain low-fat and nonfat yogurt.  Fats and oils  · Tub margarine without trans fats. Light or reduced-fat mayonnaise and salad dressings. Avocado. Olive, canola, sesame, or safflower oils.  The items listed above may not be a complete list of recommended foods or beverages. Contact your dietitian for more options.  The items listed above may not be a complete list of foods and beverages [you/your child] can eat. Contact a dietitian for more information.  Foods to avoid  Grains  · White bread. White pasta. White rice. Cornbread. Bagels, pastries, and croissants. Crackers and snack foods that contain trans fat and hydrogenated oils.  Vegetables  · Vegetables cooked in cheese, cream, or butter sauce. Fried vegetables.  Fruits  · Canned fruit in heavy syrup. Fruit in cream or butter sauce. Fried fruit.  Meats and other protein foods  · Fatty cuts of meat. Ribs, chicken wings, collins, sausage, bologna, salami, chitterlings, fatback, hot dogs, bratwurst, and packaged  lunch meats. Liver and organ meats. Whole eggs and egg yolks. Chicken and turkey with skin. Fried meat.  Dairy  · Whole or 2% milk, cream, half-and-half, and cream cheese. Whole milk cheeses. Whole-fat or sweetened yogurt. Full-fat cheeses. Nondairy creamers and whipped toppings. Processed cheese, cheese spreads, and cheese curds.  Beverages  · Alcohol. Sugar-sweetened drinks such as sodas, lemonade, and fruit drinks.  Fats and oils  · Butter, stick margarine, lard, shortening, ghee, or collins fat. Coconut, palm kernel, and palm oils.  Sweets and desserts  · Corn syrup, sugars, honey, and molasses. Candy. Jam and jelly. Syrup. Sweetened cereals. Cookies, pies, cakes, donuts, muffins, and ice cream.  The items listed above may not be a complete list of foods and beverages to avoid. Contact your dietitian for more information.  The items listed above may not be a complete list of foods and beverages [you/your child] should avoid. Contact a dietitian for more information.  Summary  · Choosing the right foods helps keep your fat and cholesterol at normal levels. This can keep you from getting certain diseases.  · At meals, fill one-half of your plate with vegetables and green salads.  · Eat high-fiber foods, like whole grains, beans, apples, carrots, peas, and barley.  · Limit added sugar, saturated fats, alcohol, and fried foods.  This information is not intended to replace advice given to you by your health care provider. Make sure you discuss any questions you have with your health care provider.  Document Released: 06/18/2013 Document Revised: 09/04/2018 Document Reviewed: 09/04/2018  [employer] Interactive Patient Education © 2019 [employer]  BMI for Adults    Body mass index (BMI) is a number that is calculated from a person's weight and height. BMI may help to estimate how much of a person's weight is composed of fat. BMI can help identify those who may be at higher risk for certain medical problems.  How is BMI  "used with adults?  BMI is used as a screening tool to identify possible weight problems. It is used to check whether a person is obese, overweight, healthy weight, or underweight.  How is BMI calculated?  BMI measures your weight and compares it to your height. This can be done either in English ([place] or metric measurements. Note that charts are available to help you find your BMI quickly and easily without having to do these calculations yourself.  To calculate your BMI in English ([place] measurements, your health care provider will:  1. Measure your weight in pounds (lb).  2. Multiply the number of pounds by 703.  ? For example, for a person who weighs 180 lb, multiply that number by 703, which equals [phone],540.  3. Measure your height in inches (in). Then multiply that number by itself to get a measurement called \"inches squared.\"  ? For example, for a person who is 70 in tall, the \"inches squared\" measurement is 70 in x 70 in, which equals 4900 inches squared.  4. Divide the total from Step 2 (number of lb x 703) by the total from Step 3 (inches squared): 126,540 ÷ 4900 = 25.8. This is your BMI.  To calculate your BMI in metric measurements, your health care provider will:  1. Measure your weight in kilograms (kg).  2. Measure your height in meters (m). Then multiply that number by itself to get a measurement called \"meters squared.\"  ? For example, for a person who is 1.75 m tall, the \"meters squared\" measurement is 1.75 m x 1.75 m, which is equal to 3.1 meters squared.  3. Divide the number of kilograms (your weight) by the meters squared number. In this example: 70 ÷ 3.1 = 22.6. This is your BMI.  How is BMI interpreted?  To interpret your results, your health care provider will use BMI charts to identify whether you are underweight, normal weight, overweight, or obese. The following guidelines will be used:  · Underweight: BMI less than 18.5.  · Normal weight: BMI between 18.5 and 24.9.  · Overweight: BMI " between 25 and 29.9.  · Obese: BMI of 30 and above.  Please note:  · Weight includes both fat and muscle, so someone with a muscular build, such as an athlete, may have a BMI that is higher than 24.9. In cases like these, BMI is not an accurate measure of body fat.  · To determine if excess body fat is the cause of a BMI of 25 or higher, further assessments may need to be done by a health care provider.  · BMI is usually interpreted in the same way for men and women.  Why is BMI a useful tool?  BMI is useful in two ways:  · Identifying a weight problem that may be related to a medical condition, or that may increase the risk for medical problems.  · Promoting lifestyle and diet changes in order to reach a healthy weight.  Summary  · Body mass index (BMI) is a number that is calculated from a person's weight and height.  · BMI may help to estimate how much of a person's weight is composed of fat. BMI can help identify those who may be at higher risk for certain medical problems.  · BMI can be measured using English measurements or metric measurements.  · To interpret your results, your health care provider will use BMI charts to identify whether you are underweight, normal weight, overweight, or obese.  This information is not intended to replace advice given to you by your health care provider. Make sure you discuss any questions you have with your health care provider.  Document Released: 08/29/2005 Document Revised: 10/31/2018 Document Reviewed: 10/31/2018  [employer] Interactive Patient Education © 2019 [employer]    Edema    Edema is an abnormal buildup of fluids in the body tissues and under the skin. Swelling of the legs, feet, and ankles is a common symptom that becomes more likely as you get older. Swelling is also common in looser tissues, like around the eyes. When the affected area is squeezed, the fluid may move out of that spot and leave a dent for a few moments. This dent is called pitting  edema.  There are many possible causes of edema. Eating too much salt (sodium) and being on your feet or sitting for a long time can cause edema in your legs, feet, and ankles. Hot weather may make edema worse. Common causes of edema include:  · Heart failure.  · Liver or kidney disease.  · Weak leg blood vessels.  · Cancer.  · An injury.  · Pregnancy.  · Medicines.  · Being obese.  · Low protein levels in the blood.  Edema is usually painless. Your skin may look swollen or shiny.  Follow these instructions at home:  · Keep the affected body part raised (elevated) above the level of your heart when you are sitting or lying down.  · Do not sit still or stand for long periods of time.  · Do not wear tight clothing. Do not wear garters on your upper legs.  · Exercise your legs to get your circulation going. This helps to move the fluid back into your blood vessels, and it may help the swelling go down.  · Wear elastic bandages or support stockings to reduce swelling as told by your health care provider.  · Eat a low-salt (low-sodium) diet to reduce fluid as told by your health care provider.  · Depending on the cause of your swelling, you may need to limit how much fluid you drink (fluid restriction).  · Take over-the-counter and prescription medicines only as told by your health care provider.  Contact a health care provider if:  · Your edema does not get better with treatment.  · You have heart, liver, or kidney disease and have symptoms of edema.  · You have sudden and unexplained weight gain.  Get help right away if:  · You develop shortness of breath or chest pain.  · You cannot breathe when you lie down.  · You develop pain, redness, or warmth in the swollen areas.  · You have heart, liver, or kidney disease and suddenly get edema.  · You have a fever and your symptoms suddenly get worse.  Summary  · Edema is an abnormal buildup of fluids in the body tissues and under the skin.  · Eating too much salt (sodium)  and being on your feet or sitting for a long time can cause edema in your legs, feet, and ankles.  · Keep the affected body part raised (elevated) above the level of your heart when you are sitting or lying down.  This information is not intended to replace advice given to you by your health care provider. Make sure you discuss any questions you have with your health care provider.  Document Released: 12/18/2006 Document Revised: 01/20/2018 Document Reviewed: 01/20/2018  [employer] Interactive Patient Education © 2019 [employer]

## 2019-09-27 NOTE — PROGRESS NOTES
Chito Nuñez is a 74 y.o. male     Chief Complaint   Patient presents with   • Follow-up   • Coronary Artery Disease   • Atrial Fibrillation       HPI    Problem List:    1. CAD  1.1 [facility] 2012 - LT Main 10-20%; LAD 40-50%; Circ 100%; RCA 20-30% RCA and 30-40%; medical management   1.2 Stress Test 1/11/17 - Anterior ischemia; cannot rule out inferior as well; decreased LVEF; intermediate risk   1.3 [facility] 1/30/17 - Stent to RCA; EF 20-25%  2. Hypertension  3. Atrial Flutter Paroxysmal CHADS 2  3.1 No anticoagulation due to GIB   3.2 Event Monitor 12/15-12/28/16 - Atrial Fib, atrial flutter, Pacemaker tachycardia   4. 2:1 AVB with presyncope/Bradycardia symptomatic   4.1 Dual chamber PPM Guidant 10/8/14  5. Dyslipidemia   6. Shortness of breath  6.1 Echo 3/11/14 - mod LVH; EF 40-50%; mild left ventricular hypokinesis; mild MR; PA 36  6.2 Echo 1/11/17 - mild LVH; EF 35-40%; DD II; mild MR and TR; PA 35-40  6.3 Echo 5/24/18-mild LVH, EF 35-40%, diastolic dysfunction 1, trace MR, mild dilation of aortic root  7. Smoking Habituation quit 9 months ago  8. Cardiomyopathy   8.1 MUGA 3/22/17 - EF 40%    Patient is a 74-year-old male who presents today for follow-up with his girlfriend at his side.  He denies any chest pain, pressure, palpations, fluttering, presyncope, syncope or PND.  Patient says that he does get swelling in his legs which typically goes down overnight or if he takes an extra Lasix.  He says he has dizziness which is very random.  He does have shortness of breath sometimes at night.  He says he always stays short of breath.  He has not smoked in 9 months.    We went over labs.    Current Outpatient Medications   Medication Sig Dispense Refill   • albuterol (PROAIR HFA) 108 (90 BASE) MCG/ACT inhaler ProAir  (90 Base) MCG/ACT Inhalation Aerosol Solution; Patient Sig: ProAir  (90 Base) MCG/ACT Inhalation Aerosol Solution INHALE 1 TO 2 PUFFS EVERY 4 TO 6 HOURS AS NEEDED.; 0;  23-Jul-2013; Active     • amiodarone (PACERONE) 200 MG tablet Take 1 tablet by mouth Daily. 90 tablet 3   • amLODIPine (NORVASC) 2.5 MG tablet Take 1 tablet by mouth Daily As Needed (hypertension). 90 tablet 3   • aspirin 81 MG tablet Take 1 tablet by mouth Daily. 90 tablet 3   • buprenorphine-naloxone (SUBOXONE) 8-2 MG per SL tablet Place 1 tablet under the tongue 3 (Three) Times a Day.     • carvedilol (COREG) 6.25 MG tablet Take 1 tablet by mouth 2 (Two) Times a Day With Meals. 180 tablet 3   • clopidogrel (PLAVIX) 75 MG tablet Take 1 tablet by mouth Daily. 90 tablet 3   • COMBIVENT RESPIMAT  MCG/ACT inhaler Inhale 1 puff Daily.     • furosemide (LASIX) 40 MG tablet Take 1 tablet by mouth 2 (Two) Times a Day. 180 tablet 3   • gabapentin (NEURONTIN) 800 MG tablet Take 800 mg by mouth 3 (Three) Times a Day As Needed.     • ipratropium-albuterol (DUO-NEB) 0.5-2.5 mg/mL nebulizer Ipratropium-Albuterol 0.5-2.5 (3) MG/3ML Inhalation Solution; Patient Sig: Ipratropium-Albuterol 0.5-2.5 (3) MG/3ML Inhalation Solution USE 1 UNIT DOSE IN NEBULIZER EVERY 4 HOURS AS NEEDED.; 0; 23-Jul-2013; Active     • lansoprazole (PREVACID) 30 MG capsule Take 30 mg by mouth Daily.     • lisinopril (PRINIVIL,ZESTRIL) 5 MG tablet Take 2 tablets by mouth Daily. 180 tablet 3   • methocarbamol (ROBAXIN) 750 MG tablet Take 1 tablet by mouth 4 (Four) Times a Day As Needed.     • polyethylene glycol (MIRALAX) powder Take  by mouth Daily.     • potassium chloride (K-DUR) 10 MEQ CR tablet Take 20 Meq daily and then take additional 20 meq with additional lasix 180 tablet 5   • simvastatin (ZOCOR) 10 MG tablet Take 1 tablet by mouth Every Evening. 90 tablet 3   • SYMBICORT 160-4.5 MCG/ACT inhaler Inhale 2 puffs Take As Directed.     • tamsulosin (FLOMAX) 0.4 MG capsule 24 hr capsule Take 1 capsule by mouth Daily. 30 capsule 0     No current facility-administered medications for this visit.        ALLERGIES    Patient has no known  allergies.    Past Medical History:   Diagnosis Date   • 2nd degree atrioventricular block 9/19/2016   • Acute renal insufficiency    • Anxiety    • Arthritis    • Asthma    • Atrial fibrillation (CMS/[facility]) 9/19/2016   • Benign prostatic hypertrophy with urinary obstruction 9/19/2016   • Bradycardia 9/19/2016   • CAD (coronary artery disease), native coronary artery 9/19/2016   • Chest pain 9/19/2016   • Congestive heart failure (CMS/[facility]) 9/19/2016   • COPD (chronic obstructive pulmonary disease) (CMS/[facility])    • Degeneration of cervical intervertebral disc    • Depression    • Dyslipidemia 9/19/2016   • Hiatal hernia    • Hyperlipidemia    • Hypertension    • Hypogonadism male 9/19/2016   • Incomplete emptying of bladder 9/19/2016   • Male erectile disorder of organic origin 9/19/2016   • Myocardial infarction (CMS/[facility])    • Sleep apnea    • Thrombocytopenia (CMS/[facility]) 9/19/2016   • Urinary hesitancy 9/19/2016       Social History     Socioeconomic History   • Marital status: Single     Spouse name: Not on file   • Number of children: Not on file   • Years of education: Not on file   • Highest education level: Not on file   Tobacco Use   • Smoking status: Former Smoker     Packs/day: 0.50     Types: Cigarettes   • Smokeless tobacco: Never Used   Substance and Sexual Activity   • Alcohol use: No   • Drug use: No   • Sexual activity: Defer       Family History   Problem Relation Age of Onset   • Diabetes Son    • No Known Problems Mother    • No Known Problems Father        Review of Systems   Constitutional: Positive for fatigue. Negative for diaphoresis.   HENT: Positive for rhinorrhea (seasonal allergies ). Negative for congestion and sore throat.    Eyes: Positive for visual disturbance (glasses daily ).   Respiratory: Positive for shortness of breath (always, regardless of activity ). Negative for chest tightness.    Cardiovascular: Positive for leg swelling (occasional BLE edema, usually goes down overnight with an  "extra lasix). Negative for chest pain (Denies CP ) and palpitations.   Gastrointestinal: Positive for constipation (uses miralax prn ) and nausea (If he takes his meds on an empty stomach ). Negative for abdominal pain, blood in stool, diarrhea and vomiting.   Endocrine: Positive for cold intolerance (\"freezing to death all the time\" ). Negative for heat intolerance.   Genitourinary: Positive for difficulty urinating (Pt c/o weak stream ). Negative for dysuria, frequency, hematuria and urgency.   Musculoskeletal: Positive for arthralgias, back pain and neck pain.   Skin: Positive for wound (small wound on right elbow ). Negative for rash.   Allergic/Immunologic: Positive for environmental allergies (seasonal ). Negative for food allergies.   Neurological: Positive for dizziness (seems random ) and weakness. Negative for syncope, light-headedness, numbness and headaches.   Hematological: Bruises/bleeds easily (both ).   Psychiatric/Behavioral: Positive for sleep disturbance (Issues staying asleep, sometimes has issues breathing at night  ).       Objective   /83   Pulse 71   Ht 175.3 cm (69\")   Wt 85.1 kg (187 lb 9.6 oz)   SpO2 99%   BMI 27.70 kg/m²   Vitals:    09/27/19 0910   BP: 134/83   Pulse: 71   SpO2: 99%   Weight: 85.1 kg (187 lb 9.6 oz)   Height: 175.3 cm (69\")      Lab Results (most recent)     None        Physical Exam   Constitutional: He is oriented to person, place, and time. Vital signs are normal. He appears well-developed and well-nourished. He is active and cooperative.   HENT:   Head: Normocephalic.   Mouth/Throat: He has dentures (upper and lower).   Eyes: Lids are normal.   Wears glasses    Neck: Normal carotid pulses, no hepatojugular reflux and no JVD present. Carotid bruit is not present.   Cardiovascular: Normal rate, regular rhythm and normal heart sounds.   Pulses:       Radial pulses are 2+ on the right side, and 2+ on the left side.        Dorsalis pedis pulses are 2+ on the " right side, and 2+ on the left side.        Posterior tibial pulses are 2+ on the right side, and 2+ on the left side.   No edema BLE; varicose veins BLE.   Pulmonary/Chest: Effort normal and breath sounds normal.   Abdominal: Normal appearance and bowel sounds are normal.   Neurological: He is alert and oriented to person, place, and time.   Skin: Skin is warm, dry and intact. Bruising ( BUE) noted.   PPM  STEPH    Psychiatric: He has a normal mood and affect. His speech is normal and behavior is normal. Judgment and thought content normal. Cognition and memory are normal.       Procedure     ECG 12 Lead  Date/Time: 2019 9:38 AM  Performed by: Radha Rajput APRN  Authorized by: Radha Rajput APRN   Comparison: compared with previous ECG from 3/22/2019  Rhythm: paced  Rate: normal  BPM: 70  QRS axis: extreme and right  Pacin% capture  Clinical impression: abnormal EKG                 Assessment/Plan      Diagnosis Plan   1. Coronary artery disease involving native coronary artery of native heart with angina pectoris (CMS/HCC)  carvedilol (COREG) 6.25 MG tablet    clopidogrel (PLAVIX) 75 MG tablet    aspirin 81 MG tablet    ECG 12 Lead    Adult Transthoracic Echo Complete W/ Cont if Necessary Per Protocol   2. Essential hypertension  lisinopril (PRINIVIL,ZESTRIL) 5 MG tablet    ECG 12 Lead    Adult Transthoracic Echo Complete W/ Cont if Necessary Per Protocol   3. Ischemic cardiomyopathy  potassium chloride (K-DUR) 10 MEQ CR tablet    Adult Transthoracic Echo Complete W/ Cont if Necessary Per Protocol   4. Pacemaker  Adult Transthoracic Echo Complete W/ Cont if Necessary Per Protocol   5. Dyslipidemia  simvastatin (ZOCOR) 10 MG tablet    Adult Transthoracic Echo Complete W/ Cont if Necessary Per Protocol   6. Paroxysmal atrial fibrillation (CMS/HCC)  amiodarone (PACERONE) 200 MG tablet    ECG 12 Lead    Adult Transthoracic Echo Complete W/ Cont if Necessary Per Protocol   7. Cardiomyopathy, unspecified  type (CMS/HCC)  furosemide (LASIX) 40 MG tablet    Adult Transthoracic Echo Complete W/ Cont if Necessary Per Protocol   8. Dizziness  Duplex Carotid Ultrasound CAR   9. Shortness of breath  Adult Transthoracic Echo Complete W/ Cont if Necessary Per Protocol       Return in about 6 months (around 3/27/2020), or with PPM check .    CAD-patient is on aspirin, beta and statin.  Hypertension-patient is doing very well on amlodipine, carvedilol and lisinopril.  Ischemic cardiomyopathy-patient's on beta, ACE and diuretics.  Permanent pacemaker-interrogated today with no episodes.  Dyslipidemia-patient is on simvastatin and doing well.  A. fib-patient is on aspirin, Plavix, amiodarone and beta.  Dizziness-patient will have carotid artery ultrasound.  Shortness of breath/cardiomyopathy/hypertension/CAD-patient will have repeat echocardiogram.  He will continue his medication regimen.  He will follow-up in 6 months with pacemaker check or sooner depending on testing results.         Patient's Body mass index is 27.7 kg/m². BMI is above normal parameters. Recommendations include: educational material.      Electronically signed by:

## 2019-10-14 ENCOUNTER — HOSPITAL ENCOUNTER (OUTPATIENT)
Dept: CARDIOLOGY | Facility: HOSPITAL | Age: 74
Discharge: HOME | End: 2019-10-14

## 2019-10-14 ENCOUNTER — HOSPITAL ENCOUNTER (OUTPATIENT)
Dept: CARDIOLOGY | Facility: HOSPITAL | Age: 74
Discharge: HOME | End: 2019-10-14
Admitting: NURSE PRACTITIONER

## 2019-10-14 DIAGNOSIS — Z95.0 PACEMAKER: ICD-10-CM

## 2019-10-14 DIAGNOSIS — R06.02 SHORTNESS OF BREATH: ICD-10-CM

## 2019-10-14 DIAGNOSIS — I42.9 CARDIOMYOPATHY, UNSPECIFIED TYPE (HCC): ICD-10-CM

## 2019-10-14 DIAGNOSIS — E78.5 DYSLIPIDEMIA: ICD-10-CM

## 2019-10-14 DIAGNOSIS — I25.5 ISCHEMIC CARDIOMYOPATHY: ICD-10-CM

## 2019-10-14 DIAGNOSIS — I48.0 PAROXYSMAL ATRIAL FIBRILLATION (HCC): ICD-10-CM

## 2019-10-14 DIAGNOSIS — I25.119 CORONARY ARTERY DISEASE INVOLVING NATIVE CORONARY ARTERY OF NATIVE HEART WITH ANGINA PECTORIS (HCC): ICD-10-CM

## 2019-10-14 DIAGNOSIS — R42 DIZZINESS: ICD-10-CM

## 2019-10-14 DIAGNOSIS — I10 ESSENTIAL HYPERTENSION: ICD-10-CM

## 2019-10-14 PROCEDURE — 93880 EXTRACRANIAL BILAT STUDY: CPT | Performed by: INTERNAL MEDICINE

## 2019-10-14 PROCEDURE — 93880 EXTRACRANIAL BILAT STUDY: CPT

## 2019-10-14 PROCEDURE — 93306 TTE W/DOPPLER COMPLETE: CPT

## 2019-10-14 PROCEDURE — 93306 TTE W/DOPPLER COMPLETE: CPT | Performed by: INTERNAL MEDICINE

## 2019-10-22 LAB
BH CV ECHO MEAS - ACS: 1.8 CM
BH CV ECHO MEAS - AO MAX PG: 10 MMHG
BH CV ECHO MEAS - AO MEAN PG: 6 MMHG
BH CV ECHO MEAS - AO ROOT AREA (BSA CORRECTED): 1.8
BH CV ECHO MEAS - AO ROOT AREA: 10.8 CM^2
BH CV ECHO MEAS - AO ROOT DIAM: 3.7 CM
BH CV ECHO MEAS - AO V2 MAX: 158 CM/SEC
BH CV ECHO MEAS - AO V2 MEAN: 119 CM/SEC
BH CV ECHO MEAS - AO V2 VTI: 36.8 CM
BH CV ECHO MEAS - BSA(HAYCOCK): 2 M^2
BH CV ECHO MEAS - BSA: 2 M^2
BH CV ECHO MEAS - BZI_BMI: 27.6 KILOGRAMS/M^2
BH CV ECHO MEAS - BZI_METRIC_HEIGHT: 175.3 CM
BH CV ECHO MEAS - BZI_METRIC_WEIGHT: 84.8 KG
BH CV ECHO MEAS - EDV(CUBED): 232.6 ML
BH CV ECHO MEAS - EDV(MOD-SP4): 105 ML
BH CV ECHO MEAS - EDV(TEICH): 190.4 ML
BH CV ECHO MEAS - EF(CUBED): 39.6 %
BH CV ECHO MEAS - EF(MOD-SP4): 50.2 %
BH CV ECHO MEAS - EF(TEICH): 32 %
BH CV ECHO MEAS - ESV(CUBED): 140.6 ML
BH CV ECHO MEAS - ESV(MOD-SP4): 52.3 ML
BH CV ECHO MEAS - ESV(TEICH): 129.5 ML
BH CV ECHO MEAS - FS: 15.4 %
BH CV ECHO MEAS - IVS/LVPW: 0.94
BH CV ECHO MEAS - IVSD: 1 CM
BH CV ECHO MEAS - LA DIMENSION: 3.6 CM
BH CV ECHO MEAS - LA/AO: 0.97
BH CV ECHO MEAS - LV DIASTOLIC VOL/BSA (35-75): 52.3 ML/M^2
BH CV ECHO MEAS - LV IVRT: 0.11 SEC
BH CV ECHO MEAS - LV MASS(C)D: 274.3 GRAMS
BH CV ECHO MEAS - LV MASS(C)DI: 136.6 GRAMS/M^2
BH CV ECHO MEAS - LV SYSTOLIC VOL/BSA (12-30): 26.1 ML/M^2
BH CV ECHO MEAS - LVIDD: 6.2 CM
BH CV ECHO MEAS - LVIDS: 5.2 CM
BH CV ECHO MEAS - LVLD AP4: 6.9 CM
BH CV ECHO MEAS - LVLS AP4: 6.8 CM
BH CV ECHO MEAS - LVOT AREA (M): 2.8 CM^2
BH CV ECHO MEAS - LVOT AREA: 2.8 CM^2
BH CV ECHO MEAS - LVOT DIAM: 1.9 CM
BH CV ECHO MEAS - LVPWD: 1.1 CM
BH CV ECHO MEAS - MV A MAX VEL: 74.9 CM/SEC
BH CV ECHO MEAS - MV DEC SLOPE: 367 CM/SEC^2
BH CV ECHO MEAS - MV E MAX VEL: 79.9 CM/SEC
BH CV ECHO MEAS - MV E/A: 1.1
BH CV ECHO MEAS - RVDD: 2.5 CM
BH CV ECHO MEAS - SI(AO): 197.1 ML/M^2
BH CV ECHO MEAS - SI(CUBED): 45.8 ML/M^2
BH CV ECHO MEAS - SI(MOD-SP4): 26.3 ML/M^2
BH CV ECHO MEAS - SI(TEICH): 30.4 ML/M^2
BH CV ECHO MEAS - SV(AO): 395.7 ML
BH CV ECHO MEAS - SV(CUBED): 92 ML
BH CV ECHO MEAS - SV(MOD-SP4): 52.7 ML
BH CV ECHO MEAS - SV(TEICH): 60.9 ML
BH CV XLRA MEAS LEFT DIST CCA EDV: 7 CM/SEC
BH CV XLRA MEAS LEFT DIST CCA PSV: 47 CM/SEC
BH CV XLRA MEAS LEFT DIST ICA EDV: 28 CM/SEC
BH CV XLRA MEAS LEFT DIST ICA PSV: 102 CM/SEC
BH CV XLRA MEAS LEFT ICA/CCA RATIO: 2.2
BH CV XLRA MEAS LEFT MID CCA EDV: 16 CM/SEC
BH CV XLRA MEAS LEFT MID CCA PSV: 68 CM/SEC
BH CV XLRA MEAS LEFT MID ICA EDV: 11 CM/SEC
BH CV XLRA MEAS LEFT MID ICA PSV: 68 CM/SEC
BH CV XLRA MEAS LEFT PROX CCA EDV: 12 CM/SEC
BH CV XLRA MEAS LEFT PROX CCA PSV: 71 CM/SEC
BH CV XLRA MEAS LEFT PROX ECA EDV: 13 CM/SEC
BH CV XLRA MEAS LEFT PROX ECA PSV: 74 CM/SEC
BH CV XLRA MEAS LEFT PROX ICA EDV: 18 CM/SEC
BH CV XLRA MEAS LEFT PROX ICA PSV: 63 CM/SEC
BH CV XLRA MEAS LEFT VERTEBRAL A EDV: 15 CM/SEC
BH CV XLRA MEAS LEFT VERTEBRAL A PSV: 44 CM/SEC
BH CV XLRA MEAS RIGHT DIST CCA EDV: 13 CM/SEC
BH CV XLRA MEAS RIGHT DIST CCA PSV: 50 CM/SEC
BH CV XLRA MEAS RIGHT DIST ICA EDV: 35 CM/SEC
BH CV XLRA MEAS RIGHT DIST ICA PSV: 135 CM/SEC
BH CV XLRA MEAS RIGHT ICA/CCA RATIO: 2.7
BH CV XLRA MEAS RIGHT MID CCA EDV: 15 CM/SEC
BH CV XLRA MEAS RIGHT MID CCA PSV: 66 CM/SEC
BH CV XLRA MEAS RIGHT MID ICA EDV: 18 CM/SEC
BH CV XLRA MEAS RIGHT MID ICA PSV: 61 CM/SEC
BH CV XLRA MEAS RIGHT PROX CCA EDV: 12 CM/SEC
BH CV XLRA MEAS RIGHT PROX CCA PSV: 102 CM/SEC
BH CV XLRA MEAS RIGHT PROX ECA EDV: 17 CM/SEC
BH CV XLRA MEAS RIGHT PROX ECA PSV: 84 CM/SEC
BH CV XLRA MEAS RIGHT PROX ICA EDV: 13 CM/SEC
BH CV XLRA MEAS RIGHT PROX ICA PSV: 37 CM/SEC
BH CV XLRA MEAS RIGHT VERTEBRAL A EDV: 12 CM/SEC
BH CV XLRA MEAS RIGHT VERTEBRAL A PSV: 42 CM/SEC
MAXIMAL PREDICTED HEART RATE: 146 BPM
STRESS TARGET HR: 124 BPM

## 2019-10-23 ENCOUNTER — TELEPHONE (OUTPATIENT)
Dept: CARDIOLOGY | Facility: CLINIC | Age: 74
End: 2019-10-23

## 2019-10-23 NOTE — TELEPHONE ENCOUNTER
Echo:  Estimated EF appears to be in the range of 46 - 50%.             Radha Rajput, Summer Gao             Advise patient, treated with ASA and statin.      Duplex Carotid:  1.  Less than or equal to 15% stenosis in both common carotid arteries, as well as the bulb and bifurcation     2.  16 to 49% stenosis in the mid and distal right internal carotid artery.     3.  15% or lesser stenosis in the left internal carotid artery.     4.  Antegrade flow in both vertebral arteries.     Summary: Nonobstructive carotid disease bilaterally as described above.  Antegrade flow in both vertebral arteries.        Follow up on 3/27/2020.

## 2019-10-23 NOTE — TELEPHONE ENCOUNTER
----- Message from MARCELA Escobedo sent at 10/23/2019  7:04 AM EDT -----  Advise patient.  His EF has come up a little.   0

## 2020-02-07 ENCOUNTER — TELEPHONE (OUTPATIENT)
Dept: CARDIOLOGY | Facility: CLINIC | Age: 75
End: 2020-02-07

## 2020-02-07 DIAGNOSIS — Z95.0 PACEMAKER: ICD-10-CM

## 2020-02-07 DIAGNOSIS — R06.02 SHORTNESS OF BREATH: ICD-10-CM

## 2020-02-07 DIAGNOSIS — I48.0 PAROXYSMAL ATRIAL FIBRILLATION (HCC): ICD-10-CM

## 2020-02-07 DIAGNOSIS — I25.119 CORONARY ARTERY DISEASE INVOLVING NATIVE CORONARY ARTERY OF NATIVE HEART WITH ANGINA PECTORIS (HCC): Primary | ICD-10-CM

## 2020-02-07 DIAGNOSIS — I10 ESSENTIAL HYPERTENSION: ICD-10-CM

## 2020-02-07 DIAGNOSIS — I42.9 CARDIOMYOPATHY, UNSPECIFIED TYPE (HCC): ICD-10-CM

## 2020-02-07 NOTE — TELEPHONE ENCOUNTER
Ana Maria contacted me and states that the office in [place] is closed and per Radha, have pt come here Monday at 10am.       Informed pt of the above, he verbalized understanding to go to our basement at 10am Monday.

## 2020-02-07 NOTE — TELEPHONE ENCOUNTER
----- Message from MARCELA Escobedo sent at 2/6/2020  7:23 PM EST -----  Please call patient as early as possible in AM.  See if he can go to [place], [place], [address] It is the building in front of Dr. Wiley's building.  Coming from [place] its on the right before  [email or web address].  It is a [facility] office he will be going to.  Have him be there by 11 am and Deb will reprogram his device.      Also send in lab order for BMP, Mg and TSH and find out where he wants to get them and fax them there.  Thanks!

## 2020-02-07 NOTE — TELEPHONE ENCOUNTER
Informed pt of the message from Radha, he voiced his frustration that it's not being done at the [place] office w/ Radha. I informed him that Radha is trying to get him taken care of and that w/ the snow, travel will be harder, so she contacted [place] office for assistance.   Pt stated that he may not make it, I advised him to call out office, opt 2, if he can't.       Pt stated to get lab orders sent to PCP.   Faxed lab orders to [phone]

## 2020-02-10 ENCOUNTER — TELEPHONE (OUTPATIENT)
Dept: CARDIOLOGY | Facility: CLINIC | Age: 75
End: 2020-02-10

## 2020-02-10 ENCOUNTER — OFFICE VISIT (OUTPATIENT)
Dept: CARDIOLOGY | Facility: CLINIC | Age: 75
End: 2020-02-10

## 2020-02-10 ENCOUNTER — LAB (OUTPATIENT)
Dept: CARDIOLOGY | Facility: CLINIC | Age: 75
End: 2020-02-10

## 2020-02-10 DIAGNOSIS — I48.0 PAROXYSMAL ATRIAL FIBRILLATION (HCC): ICD-10-CM

## 2020-02-10 DIAGNOSIS — R06.02 SHORTNESS OF BREATH: Primary | ICD-10-CM

## 2020-02-10 DIAGNOSIS — I42.9 CARDIOMYOPATHY, UNSPECIFIED TYPE (HCC): ICD-10-CM

## 2020-02-10 DIAGNOSIS — I25.5 ISCHEMIC CARDIOMYOPATHY: Primary | ICD-10-CM

## 2020-02-10 DIAGNOSIS — R06.02 SHORTNESS OF BREATH: ICD-10-CM

## 2020-02-10 DIAGNOSIS — I25.119 CORONARY ARTERY DISEASE INVOLVING NATIVE CORONARY ARTERY OF NATIVE HEART WITH ANGINA PECTORIS (HCC): ICD-10-CM

## 2020-02-10 DIAGNOSIS — Z95.0 PACEMAKER: ICD-10-CM

## 2020-02-10 DIAGNOSIS — I10 ESSENTIAL HYPERTENSION: ICD-10-CM

## 2020-02-10 LAB
ANION GAP SERPL CALCULATED.3IONS-SCNC: 12.8 MMOL/L (ref 5–15)
ANISOCYTOSIS BLD QL: NORMAL
BASOPHILS # BLD AUTO: 0.05 10*3/MM3 (ref 0–0.2)
BASOPHILS NFR BLD AUTO: 0.7 % (ref 0–1.5)
BUN BLD-MCNC: 20 MG/DL (ref 8–23)
BUN/CREAT SERPL: 16.4 (ref 7–25)
CALCIUM SPEC-SCNC: 8.8 MG/DL (ref 8.6–10.5)
CHLORIDE SERPL-SCNC: 100 MMOL/L (ref 98–107)
CO2 SERPL-SCNC: 28.2 MMOL/L (ref 22–29)
CREAT BLD-MCNC: 1.22 MG/DL (ref 0.76–1.27)
D DIMER PPP FEU-MCNC: 0.48 MCGFEU/ML (ref 0–0.5)
DEPRECATED RDW RBC AUTO: 46.9 FL (ref 37–54)
EOSINOPHIL # BLD AUTO: 0.16 10*3/MM3 (ref 0–0.4)
EOSINOPHIL NFR BLD AUTO: 2.2 % (ref 0.3–6.2)
ERYTHROCYTE [DISTWIDTH] IN BLOOD BY AUTOMATED COUNT: 19.9 % (ref 12.3–15.4)
GFR SERPL CREATININE-BSD FRML MDRD: 58 ML/MIN/1.73
GLUCOSE BLD-MCNC: 120 MG/DL (ref 65–99)
HCT VFR BLD AUTO: 29.2 % (ref 37.5–51)
HGB BLD-MCNC: 7.3 G/DL (ref 13–17.7)
HYPOCHROMIA BLD QL: NORMAL
IMM GRANULOCYTES # BLD AUTO: 0.03 10*3/MM3 (ref 0–0.05)
IMM GRANULOCYTES NFR BLD AUTO: 0.4 % (ref 0–0.5)
LARGE PLATELETS: NORMAL
LYMPHOCYTES # BLD AUTO: 1.12 10*3/MM3 (ref 0.7–3.1)
LYMPHOCYTES NFR BLD AUTO: 15.6 % (ref 19.6–45.3)
MAGNESIUM SERPL-MCNC: 2.2 MG/DL (ref 1.6–2.4)
MCH RBC QN AUTO: 16.6 PG (ref 26.6–33)
MCHC RBC AUTO-ENTMCNC: 25 G/DL (ref 31.5–35.7)
MCV RBC AUTO: 66.2 FL (ref 79–97)
MICROCYTES BLD QL: NORMAL
MONOCYTES # BLD AUTO: 0.78 10*3/MM3 (ref 0.1–0.9)
MONOCYTES NFR BLD AUTO: 10.9 % (ref 5–12)
NEUTROPHILS # BLD AUTO: 5.02 10*3/MM3 (ref 1.7–7)
NEUTROPHILS NFR BLD AUTO: 70.2 % (ref 42.7–76)
NRBC BLD AUTO-RTO: 0 /100 WBC (ref 0–0.2)
NT-PROBNP SERPL-MCNC: 3600 PG/ML (ref 5–900)
PLATELET # BLD AUTO: 309 10*3/MM3 (ref 140–450)
PMV BLD AUTO: 9.6 FL (ref 6–12)
POTASSIUM BLD-SCNC: 4.5 MMOL/L (ref 3.5–5.2)
RBC # BLD AUTO: 4.41 10*6/MM3 (ref 4.14–5.8)
SODIUM BLD-SCNC: 141 MMOL/L (ref 136–145)
TSH SERPL DL<=0.05 MIU/L-ACNC: 3.73 UIU/ML (ref 0.27–4.2)
WBC NRBC COR # BLD: 7.16 10*3/MM3 (ref 3.4–10.8)

## 2020-02-10 PROCEDURE — 36415 COLL VENOUS BLD VENIPUNCTURE: CPT

## 2020-02-10 PROCEDURE — 85379 FIBRIN DEGRADATION QUANT: CPT | Performed by: NURSE PRACTITIONER

## 2020-02-10 PROCEDURE — 85025 COMPLETE CBC W/AUTO DIFF WBC: CPT | Performed by: NURSE PRACTITIONER

## 2020-02-10 PROCEDURE — 83880 ASSAY OF NATRIURETIC PEPTIDE: CPT | Performed by: NURSE PRACTITIONER

## 2020-02-10 PROCEDURE — 85007 BL SMEAR W/DIFF WBC COUNT: CPT | Performed by: NURSE PRACTITIONER

## 2020-02-10 PROCEDURE — 84443 ASSAY THYROID STIM HORMONE: CPT | Performed by: NURSE PRACTITIONER

## 2020-02-10 PROCEDURE — 83735 ASSAY OF MAGNESIUM: CPT | Performed by: NURSE PRACTITIONER

## 2020-02-10 PROCEDURE — 93280 PM DEVICE PROGR EVAL DUAL: CPT | Performed by: INTERNAL MEDICINE

## 2020-02-10 PROCEDURE — 80048 BASIC METABOLIC PNL TOTAL CA: CPT | Performed by: NURSE PRACTITIONER

## 2020-02-10 NOTE — TELEPHONE ENCOUNTER
Here for device check/adjustment today.  He is getting extremely short of breath with minimal activity.  EF has improved since last echo.  We are doing blood work and referring patient to pulmonary.

## 2020-02-11 ENCOUNTER — TELEPHONE (OUTPATIENT)
Dept: CARDIOLOGY | Facility: CLINIC | Age: 75
End: 2020-02-11

## 2020-02-11 NOTE — TELEPHONE ENCOUNTER
Spoke w/ pt and informed him of the message from Radha, he verbalized understanding and stated that he would go to a [facility].

## 2020-02-11 NOTE — TELEPHONE ENCOUNTER
Lorelei sam/ [facility] called requesting pt's lab results be faxed to [phone].     Faxed over as requested.

## 2020-02-11 NOTE — TELEPHONE ENCOUNTER
[facility] called stating is there and says he needs a a blood transfusion. I informed her that he was directed to go to the ER and that he stated [facility] was going [facility], which is why no records were sent over. She stated she would redirect pt to the ER and have them call me if they have any specific questions regd pt.

## 2020-02-11 NOTE — TELEPHONE ENCOUNTER
----- Message from MARCELA Escobedo sent at 2/10/2020  8:58 PM EST -----  I called patient last night and left .  I tried to call Mitzy, his girlfriend and her number beeped twice and disconnected.  Please call this patient first thing this AM and see if he is home. If so advise he needs to go to ER due to low HGB and secondary CHF.  See which ER he wants to go to .  If he goes to [facility] we do not need to call as they have access to labs, however, if he is going to a different facility then we need to call ER and give heads up and fax over results.  Thanks!

## 2020-04-02 ENCOUNTER — OFFICE VISIT (OUTPATIENT)
Dept: CARDIOLOGY | Facility: CLINIC | Age: 75
End: 2020-04-02

## 2020-04-02 VITALS — DIASTOLIC BLOOD PRESSURE: 77 MMHG | SYSTOLIC BLOOD PRESSURE: 125 MMHG | HEART RATE: 75 BPM

## 2020-04-02 DIAGNOSIS — I10 ESSENTIAL HYPERTENSION: ICD-10-CM

## 2020-04-02 DIAGNOSIS — I42.9 CARDIOMYOPATHY, UNSPECIFIED TYPE (HCC): ICD-10-CM

## 2020-04-02 DIAGNOSIS — I25.5 ISCHEMIC CARDIOMYOPATHY: ICD-10-CM

## 2020-04-02 DIAGNOSIS — E78.5 DYSLIPIDEMIA: ICD-10-CM

## 2020-04-02 DIAGNOSIS — I50.20 SYSTOLIC CONGESTIVE HEART FAILURE, UNSPECIFIED HF CHRONICITY (HCC): ICD-10-CM

## 2020-04-02 DIAGNOSIS — I48.0 PAROXYSMAL ATRIAL FIBRILLATION (HCC): ICD-10-CM

## 2020-04-02 DIAGNOSIS — Z95.0 PACEMAKER: ICD-10-CM

## 2020-04-02 DIAGNOSIS — J06.9 UPPER RESPIRATORY TRACT INFECTION, UNSPECIFIED TYPE: ICD-10-CM

## 2020-04-02 DIAGNOSIS — R06.02 SHORTNESS OF BREATH: ICD-10-CM

## 2020-04-02 DIAGNOSIS — I25.119 CORONARY ARTERY DISEASE INVOLVING NATIVE CORONARY ARTERY OF NATIVE HEART WITH ANGINA PECTORIS (HCC): Primary | ICD-10-CM

## 2020-04-02 PROBLEM — F17.200 SMOKING: Status: RESOLVED | Noted: 2017-10-27 | Resolved: 2020-04-02

## 2020-04-02 PROCEDURE — 99441 PR PHYS/QHP TELEPHONE EVALUATION 5-10 MIN: CPT | Performed by: NURSE PRACTITIONER

## 2020-04-02 RX ORDER — AZITHROMYCIN 250 MG/1
TABLET, FILM COATED ORAL
Qty: 6 TABLET | Refills: 0 | Status: SHIPPED | OUTPATIENT
Start: 2020-04-02 | End: 2020-08-28

## 2020-04-02 RX ORDER — ESCITALOPRAM OXALATE 5 MG/1
5 TABLET ORAL DAILY
COMMUNITY
Start: 2020-03-16 | End: 2020-12-01 | Stop reason: ALTCHOICE

## 2020-04-02 RX ORDER — ROPINIROLE 1 MG/1
1 TABLET, FILM COATED ORAL 3 TIMES DAILY
COMMUNITY
Start: 2020-03-16

## 2020-04-02 NOTE — PATIENT INSTRUCTIONS
Advance Care Planning and Advance Directives     You make decisions on a daily basis - decisions about where you want to live, your career, your home, your life. Perhaps one of the most important decisions you face is your choice for future medical care. Take time to talk with your family and your healthcare team and start planning today.  Advance Care Planning is a process that can help you:  · Understand possible future healthcare decisions in light of your own experiences  · Reflect on those decision in light of your goals and values  · Discuss your decisions with those closest to you and the healthcare professionals that care for you  · Make a plan by creating a document that reflects your wishes    Surrogate Decision Maker  In the event of a medical emergency, which has left you unable to communicate or to make your own decisions, you would need someone to make decisions for you.  It is important to discuss your preferences for medical treatment with this person while you are in good health.     Qualities of a surrogate decision maker:  • Willing to take on this role and responsibility  • Knows what you want for future medical care  • Willing to follow your wishes even if they don't agree with them  • Able to make difficult medical decisions under stressful circumstances    Advance Directives  These are legal documents you can create that will guide your healthcare team and decision maker(s) when needed. These documents can be stored in the electronic medical record.    · Living Will - a legal document to guide your care if you have a terminal condition or a serious illness and are unable to communicate. States vary by statute in document names/types, but most forms may include one or more of the following:        -  Directions regarding life-prolonging treatments        -  Directions regarding artificially provided nutrition/hydration        -  Choosing a healthcare decision maker        -  Direction  regarding organ/tissue donation    · Durable Power of  for Healthcare - this document names an -in-fact to make medical decisions for you, but it may also allow this person to make personal and financial decisions for you. Please seek the advice of an  if you need this type of document.    **Advance Directives are not required and no one may discriminate against you if you do not sign one.    Medical Orders  Many states allow specific forms/orders signed by your physician to record your wishes for medical treatment in your current state of health. This form, signed in personal communication with your physician, addresses resuscitation and other medical interventions that you may or may not want.      For more information or to schedule a time with a [facility] Advance Care Planning Facilitator contact: [email or web address] or call [phone] and someone will contact you directly.

## 2020-04-02 NOTE — PROGRESS NOTES
Subjective   Kandice Nuñez is a 74 y.o. male     Chief Complaint   Patient presents with   • Coronary Artery Disease       HPI    Problem List:    1. CAD  1.1 [facility] 2012 - LT Main 10-20%; LAD 40-50%; Circ 100%; RCA 20-30% RCA and 30-40%; medical management   1.2 Stress Test 1/11/17 - Anterior ischemia; cannot rule out inferior as well; decreased LVEF; intermediate risk   1.3 [facility] 1/30/17 - Stent to RCA; EF 20-25%  2. Hypertension  3. Atrial Flutter Paroxysmal CHADS 2  3.1 No anticoagulation due to GIB   3.2 Event Monitor 12/15-12/28/16 - Atrial Fib, atrial flutter, Pacemaker tachycardia   4. 2:1 AVB with presyncope/Bradycardia symptomatic   4.1 Dual chamber PPM Guidant 10/8/14  5. Dyslipidemia   6. Shortness of breath  6.1 Echo 3/11/14 - mod LVH; EF 40-50%; mild left ventricular hypokinesis; mild MR; PA 36  6.2 Echo 1/11/17 - mild LVH; EF 35-40%; DD II; mild MR and TR; PA 35-40  6.3 Echo 5/24/18-mild LVH, EF 35-40%, diastolic dysfunction 1, trace MR, mild dilation of aortic root  6.4 Echo 10/14/2019 -EF 40 to 45%, mild LVH, diastolic dysfunction 2, trivial to mild TR  7. Smoking Habituation quit 9 months ago  8. Cardiomyopathy   8.1 MUGA 3/22/17 - EF 40%  9.  Carotid artery disease   9.1 Carotid Artery US 10/14/2019-less than or equal to 50% stenosis in both common carotid arteries, 16 to 49% stenosis in the mid and distal right internal carotid artery, 50% or less or stenosis in left internal carotid artery, antegrade flow both vertebral arteries    Patient is a 74-year-old male who presents today via telephone visit.  He denies any chest pain, pressure, palpitations, fluttering, dizziness, presyncope, syncope, orthopnea, PND or edema.  He does still have shortness of breath and fatigue however he says since receiving his 2 units of blood he feels much better.  He does go this coming week to get his CBC rechecked.  He did have upper and lower scope and they did not find any signs of bleeding.    Current Outpatient  Medications on File Prior to Visit   Medication Sig Dispense Refill   • albuterol (PROAIR HFA) 108 (90 BASE) MCG/ACT inhaler ProAir  (90 Base) MCG/ACT Inhalation Aerosol Solution; Patient Sig: ProAir  (90 Base) MCG/ACT Inhalation Aerosol Solution INHALE 1 TO 2 PUFFS EVERY 4 TO 6 HOURS AS NEEDED.; 0; 23-Jul-2013; Active     • amiodarone (PACERONE) 200 MG tablet Take 1 tablet by mouth Daily. 90 tablet 3   • amLODIPine (NORVASC) 2.5 MG tablet Take 1 tablet by mouth Daily As Needed (hypertension). 90 tablet 3   • aspirin 81 MG tablet Take 1 tablet by mouth Daily. 90 tablet 3   • buprenorphine-naloxone (SUBOXONE) 8-2 MG per SL tablet Place 1 tablet under the tongue 3 (Three) Times a Day.     • carvedilol (COREG) 6.25 MG tablet Take 1 tablet by mouth 2 (Two) Times a Day With Meals. 180 tablet 3   • clopidogrel (PLAVIX) 75 MG tablet Take 1 tablet by mouth Daily. 90 tablet 3   • COMBIVENT RESPIMAT  MCG/ACT inhaler Inhale 1 puff Daily.     • escitalopram (LEXAPRO) 5 MG tablet Take 5 mg by mouth Daily.     • furosemide (LASIX) 40 MG tablet Take 1 tablet by mouth 2 (Two) Times a Day. 180 tablet 3   • gabapentin (NEURONTIN) 800 MG tablet Take 800 mg by mouth 3 (Three) Times a Day As Needed.     • ipratropium-albuterol (DUO-NEB) 0.5-2.5 mg/mL nebulizer Ipratropium-Albuterol 0.5-2.5 (3) MG/3ML Inhalation Solution; Patient Sig: Ipratropium-Albuterol 0.5-2.5 (3) MG/3ML Inhalation Solution USE 1 UNIT DOSE IN NEBULIZER EVERY 4 HOURS AS NEEDED.; 0; 23-Jul-2013; Active     • lansoprazole (PREVACID) 30 MG capsule Take 30 mg by mouth Daily.     • lisinopril (PRINIVIL,ZESTRIL) 5 MG tablet Take 2 tablets by mouth Daily. 180 tablet 3   • methocarbamol (ROBAXIN) 750 MG tablet Take 1 tablet by mouth 4 (Four) Times a Day As Needed.     • polyethylene glycol (MIRALAX) powder Take  by mouth Daily.     • potassium chloride (K-DUR) 10 MEQ CR tablet Take 20 Meq daily and then take additional 20 meq with additional lasix 180  tablet 5   • rOPINIRole (REQUIP) 1 MG tablet Take 1 mg by mouth 3 (Three) Times a Day.     • simvastatin (ZOCOR) 10 MG tablet Take 1 tablet by mouth Every Evening. 90 tablet 3   • SYMBICORT 160-4.5 MCG/ACT inhaler Inhale 2 puffs Take As Directed.     • tamsulosin (FLOMAX) 0.4 MG capsule 24 hr capsule Take 1 capsule by mouth Daily. 30 capsule 0     No current facility-administered medications on file prior to visit.        ALLERGIES    Patient has no known allergies.    Past Medical History:   Diagnosis Date   • 2nd degree atrioventricular block 9/19/2016   • Acute renal insufficiency    • Anxiety    • Arthritis    • Asthma    • Atrial fibrillation (CMS/[facility]) 9/19/2016   • Benign prostatic hypertrophy with urinary obstruction 9/19/2016   • Bradycardia 9/19/2016   • CAD (coronary artery disease), native coronary artery 9/19/2016   • Chest pain 9/19/2016   • Congestive heart failure (CMS/[facility]) 9/19/2016   • COPD (chronic obstructive pulmonary disease) (CMS/[facility])    • Degeneration of cervical intervertebral disc    • Depression    • Dyslipidemia 9/19/2016   • Hiatal hernia    • Hyperlipidemia    • Hypertension    • Hypogonadism male 9/19/2016   • Incomplete emptying of bladder 9/19/2016   • Male erectile disorder of organic origin 9/19/2016   • Myocardial infarction (CMS/[facility])    • Sleep apnea    • Thrombocytopenia (CMS/[facility]) 9/19/2016   • Urinary hesitancy 9/19/2016       Social History     Socioeconomic History   • Marital status: Single     Spouse name: Not on file   • Number of children: Not on file   • Years of education: Not on file   • Highest education level: Not on file   Tobacco Use   • Smoking status: Former Smoker     Packs/day: 0.50     Types: Cigarettes   • Smokeless tobacco: Never Used   Substance and Sexual Activity   • Alcohol use: No   • Drug use: No   • Sexual activity: Defer       Family History   Problem Relation Age of Onset   • Diabetes Son    • No Known Problems Mother    • No Known Problems Father           Review of Systems   Constitutional: Positive for fatigue (easily fatigued). Negative for diaphoresis.   HENT: Positive for rhinorrhea (runny nose x3 months). Negative for congestion and sneezing.    Eyes: Positive for visual disturbance (wears glasses daily).   Respiratory: Positive for cough (current dry cough ) and shortness of breath (easily SOA; worse on exertion; much better since he got his blood transfusion, 2 units; gets rechecked this week ). Negative for chest tightness and wheezing.    Cardiovascular: Negative for chest pain, palpitations and leg swelling.   Gastrointestinal: Negative for abdominal pain, nausea and vomiting.        Colonoscopy/EGD when he had transfusion, only thing they found was diverticulitis    Endocrine: Negative for cold intolerance and heat intolerance.   Genitourinary: Positive for difficulty urinating (urinary retention ). Negative for frequency and urgency.   Musculoskeletal: Positive for arthralgias (joints) and back pain (back pain from arthritis). Negative for neck pain and neck stiffness.   Skin: Negative for rash and wound.   Allergic/Immunologic: Negative for environmental allergies and food allergies.   Neurological: Negative for dizziness, syncope and light-headedness.   Hematological: Bruises/bleeds easily (bruises easily).   Psychiatric/Behavioral: Positive for agitation (easily agitated) and confusion (easily confused). Negative for sleep disturbance (denies waking up smothering/SOA). The patient is not nervous/anxious.        Objective   /77 (BP Location: Left arm, Patient Position: Sitting)   Pulse 75   Vitals:    04/02/20 1356   BP: 125/77   BP Location: Left arm   Patient Position: Sitting   Pulse: 75      Lab Results (most recent)     None        Physical Exam   Vitals reviewed.      Procedure   Procedures         Assessment/Plan      Diagnosis Plan   1. Coronary artery disease involving native coronary artery of native heart with angina pectoris  (CMS/HCC)     2. Paroxysmal atrial fibrillation (CMS/HCC)     3. Cardiomyopathy, unspecified type (CMS/HCC)     4. Systolic congestive heart failure, unspecified HF chronicity (CMS/HCC)     5. Essential hypertension     6. Ischemic cardiomyopathy     7. Pacemaker     8. Shortness of breath     9. Dyslipidemia     10. Upper respiratory tract infection, unspecified type  azithromycin (ZITHROMAX) 250 MG tablet       Return in about 4 months (around 8/2/2020), or make with PPM as well .  CAD-patient's on aspirin, beta and statin.  Paroxysmal A. fib-patient is on amnio, aspirin, Plavix and beta.  He had refused anticoagulation in the past due to history of bleed.  Cardiomyopathy-patient is having improvement on carvedilol and lisinopril.  He also takes a diuretic.  Hypertension-patient's doing well on amlodipine, carvedilol and lisinopril.  Permanent pacemaker-interrogated on regular basis.  Shortness of breath-stable.  Dyslipidemia-patient's on simvastatin monitor by PCP.  URI-patient will have a Z-Chuy.  He will continue his medication regimen otherwise.  He will follow-up in August with his pacemaker check or sooner if any changes.    You have chosen to receive care through a telephone visit today. Do you consent to use a telephone visit for your medical care today? YES    This visit has been rescheduled as a phone visit to comply with patient safety concerns in accordance with CDC recommendations. Total time of discussion was 7 minutes.      Advance Care Planning   ACP discussion was declined by the patient. Patient does not have an advance directive, declines further assistance.     Patient's There is no height or weight on file to calculate BMI. BMI is within normal parameters. No follow-up required..      Electronically signed by:

## 2020-08-28 ENCOUNTER — OFFICE VISIT (OUTPATIENT)
Dept: CARDIOLOGY | Facility: CLINIC | Age: 75
End: 2020-08-28

## 2020-08-28 VITALS
HEART RATE: 73 BPM | HEIGHT: 69 IN | OXYGEN SATURATION: 94 % | SYSTOLIC BLOOD PRESSURE: 142 MMHG | BODY MASS INDEX: 29.41 KG/M2 | DIASTOLIC BLOOD PRESSURE: 82 MMHG | WEIGHT: 198.6 LBS

## 2020-08-28 DIAGNOSIS — R60.9 PERIPHERAL EDEMA: ICD-10-CM

## 2020-08-28 DIAGNOSIS — I10 ESSENTIAL HYPERTENSION: ICD-10-CM

## 2020-08-28 DIAGNOSIS — E78.5 DYSLIPIDEMIA: ICD-10-CM

## 2020-08-28 DIAGNOSIS — I25.119 CORONARY ARTERY DISEASE INVOLVING NATIVE CORONARY ARTERY OF NATIVE HEART WITH ANGINA PECTORIS (HCC): Primary | ICD-10-CM

## 2020-08-28 DIAGNOSIS — I48.0 PAROXYSMAL ATRIAL FIBRILLATION (HCC): ICD-10-CM

## 2020-08-28 DIAGNOSIS — R06.02 SHORTNESS OF BREATH: ICD-10-CM

## 2020-08-28 DIAGNOSIS — J40 BRONCHITIS: ICD-10-CM

## 2020-08-28 DIAGNOSIS — R42 DIZZINESS: ICD-10-CM

## 2020-08-28 DIAGNOSIS — I51.89 GRADE I DIASTOLIC DYSFUNCTION: ICD-10-CM

## 2020-08-28 DIAGNOSIS — Z95.0 PACEMAKER: ICD-10-CM

## 2020-08-28 DIAGNOSIS — R00.1 BRADYCARDIA: ICD-10-CM

## 2020-08-28 DIAGNOSIS — I25.5 ISCHEMIC CARDIOMYOPATHY: ICD-10-CM

## 2020-08-28 DIAGNOSIS — I42.9 CARDIOMYOPATHY, UNSPECIFIED TYPE (HCC): ICD-10-CM

## 2020-08-28 DIAGNOSIS — Z00.00 HEALTHCARE MAINTENANCE: ICD-10-CM

## 2020-08-28 DIAGNOSIS — R07.89 CHEST TIGHTNESS: ICD-10-CM

## 2020-08-28 PROCEDURE — 93288 INTERROG EVL PM/LDLS PM IP: CPT | Performed by: INTERNAL MEDICINE

## 2020-08-28 PROCEDURE — 93000 ELECTROCARDIOGRAM COMPLETE: CPT | Performed by: NURSE PRACTITIONER

## 2020-08-28 PROCEDURE — 99214 OFFICE O/P EST MOD 30 MIN: CPT | Performed by: NURSE PRACTITIONER

## 2020-08-28 RX ORDER — METHYLPREDNISOLONE 4 MG/1
TABLET ORAL
Qty: 21 EACH | Refills: 0 | Status: SHIPPED | OUTPATIENT
Start: 2020-08-28 | End: 2020-12-01 | Stop reason: ALTCHOICE

## 2020-08-28 RX ORDER — POTASSIUM CHLORIDE 750 MG/1
TABLET, FILM COATED, EXTENDED RELEASE ORAL
Qty: 180 TABLET | Refills: 5 | Status: SHIPPED | OUTPATIENT
Start: 2020-08-28 | End: 2020-12-01 | Stop reason: SDUPTHER

## 2020-08-28 RX ORDER — CARVEDILOL 6.25 MG/1
6.25 TABLET ORAL 2 TIMES DAILY WITH MEALS
Qty: 180 TABLET | Refills: 3 | Status: SHIPPED | OUTPATIENT
Start: 2020-08-28 | End: 2020-12-01 | Stop reason: SDUPTHER

## 2020-08-28 RX ORDER — AMIODARONE HYDROCHLORIDE 200 MG/1
200 TABLET ORAL DAILY
Qty: 90 TABLET | Refills: 3 | Status: SHIPPED | OUTPATIENT
Start: 2020-08-28 | End: 2020-12-01 | Stop reason: SDUPTHER

## 2020-08-28 RX ORDER — SIMVASTATIN 10 MG
10 TABLET ORAL EVERY EVENING
Qty: 90 TABLET | Refills: 3 | Status: SHIPPED | OUTPATIENT
Start: 2020-08-28 | End: 2020-12-01 | Stop reason: SDUPTHER

## 2020-08-28 RX ORDER — CLOPIDOGREL BISULFATE 75 MG/1
75 TABLET ORAL DAILY
Qty: 90 TABLET | Refills: 3 | Status: SHIPPED | OUTPATIENT
Start: 2020-08-28 | End: 2020-12-01 | Stop reason: SDUPTHER

## 2020-08-28 RX ORDER — AMLODIPINE BESYLATE 2.5 MG/1
2.5 TABLET ORAL DAILY PRN
Qty: 90 TABLET | Refills: 3 | Status: SHIPPED | OUTPATIENT
Start: 2020-08-28 | End: 2020-12-01 | Stop reason: SDUPTHER

## 2020-08-28 RX ORDER — LISINOPRIL 5 MG/1
10 TABLET ORAL DAILY
Qty: 180 TABLET | Refills: 3 | Status: SHIPPED | OUTPATIENT
Start: 2020-08-28 | End: 2020-12-01 | Stop reason: SDUPTHER

## 2020-08-28 RX ORDER — FUROSEMIDE 40 MG/1
40 TABLET ORAL 2 TIMES DAILY
Qty: 180 TABLET | Refills: 3 | Status: SHIPPED | OUTPATIENT
Start: 2020-08-28 | End: 2020-12-01 | Stop reason: SDUPTHER

## 2020-08-28 NOTE — PROGRESS NOTES
Subjective   Kandice Nuñez is a 75 y.o. male     Chief Complaint   Patient presents with   • Follow-up     4 month follow up       HPI    Problem List:    1. CAD  1.1 [facility] 2012 - LT Main 10-20%; LAD 40-50%; Circ 100%; RCA 20-30% RCA and 30-40%; medical management   1.2 Stress Test 1/11/17 - Anterior ischemia; cannot rule out inferior as well; decreased LVEF; intermediate risk   1.3 [facility] 1/30/17 - Stent to RCA; EF 20-25%  2. Hypertension  3. Atrial Flutter Paroxysmal CHADS 2  3.1 No anticoagulation due to GIB   3.2 Event Monitor 12/15-12/28/16 - Atrial Fib, atrial flutter, Pacemaker tachycardia   4. 2:1 AVB with presyncope/Bradycardia symptomatic   4.1 Dual chamber PPM Guidant 10/8/14  5. Dyslipidemia   6. Shortness of breath  6.1 Echo 3/11/14 - mod LVH; EF 40-50%; mild left ventricular hypokinesis; mild MR; PA 36  6.2 Echo 1/11/17 - mild LVH; EF 35-40%; DD II; mild MR and TR; PA 35-40  6.3 Echo 5/24/18-mild LVH, EF 35-40%, diastolic dysfunction 1, trace MR, mild dilation of aortic root  6.4 Echo 10/14/2019 -EF 40 to 45%, mild LVH, diastolic dysfunction 2, trivial to mild TR  7. Smoking Habituation quit 9 months ago  8. Cardiomyopathy   8.1 MUGA 3/22/17 - EF 40%  9.  Carotid artery disease   9.1 Carotid Artery US 10/14/2019-less than or equal to 50% stenosis in both common carotid arteries, 16 to 49% stenosis in the mid and distal right internal carotid artery, 50% or less or stenosis in left internal carotid artery, antegrade flow both vertebral arteries    Patient is a 75-year-old male who presents today for follow-up with his girlfriend at his side.  He says he has some chest tightness when he lays down at night.  Patient did admit he is only been taking his diuretic during the morning and rarely takes his evening dose.  He does get short of breath when this occurs.  He does not take any nitroglycerin when it happens.  He denies any other chest pain, pressure, palpitations, fluttering, presyncope, syncope,  orthopnea or PND.  He says he does get swelling on occasion the Lasix does help.  He says he does have shortness of breath whenever he is walking to his garage and back and he will stop and rest.  He says he stays tired.  He does have dizziness sometimes when he sitting and sometimes when he standing.    Current Outpatient Medications on File Prior to Visit   Medication Sig Dispense Refill   • albuterol (PROAIR HFA) 108 (90 BASE) MCG/ACT inhaler ProAir  (90 Base) MCG/ACT Inhalation Aerosol Solution; Patient Sig: ProAir  (90 Base) MCG/ACT Inhalation Aerosol Solution INHALE 1 TO 2 PUFFS EVERY 4 TO 6 HOURS AS NEEDED.; 0; 23-Jul-2013; Active     • aspirin 81 MG tablet Take 1 tablet by mouth Daily. 90 tablet 3   • buprenorphine-naloxone (SUBOXONE) 8-2 MG per SL tablet Place 1 tablet under the tongue 3 (Three) Times a Day.     • COMBIVENT RESPIMAT  MCG/ACT inhaler Inhale 1 puff Daily.     • gabapentin (NEURONTIN) 800 MG tablet Take 800 mg by mouth 3 (Three) Times a Day As Needed.     • ipratropium-albuterol (DUO-NEB) 0.5-2.5 mg/mL nebulizer Ipratropium-Albuterol 0.5-2.5 (3) MG/3ML Inhalation Solution; Patient Sig: Ipratropium-Albuterol 0.5-2.5 (3) MG/3ML Inhalation Solution USE 1 UNIT DOSE IN NEBULIZER EVERY 4 HOURS AS NEEDED.; 0; 23-Jul-2013; Active     • lansoprazole (PREVACID) 30 MG capsule Take 30 mg by mouth Daily.     • methocarbamol (ROBAXIN) 750 MG tablet Take 1 tablet by mouth 4 (Four) Times a Day As Needed.     • polyethylene glycol (MIRALAX) powder Take  by mouth Daily.     • rOPINIRole (REQUIP) 1 MG tablet Take 1 mg by mouth 3 (Three) Times a Day.     • SYMBICORT 160-4.5 MCG/ACT inhaler Inhale 2 puffs Take As Directed.     • tamsulosin (FLOMAX) 0.4 MG capsule 24 hr capsule Take 1 capsule by mouth Daily. 30 capsule 0   • [DISCONTINUED] amiodarone (PACERONE) 200 MG tablet Take 1 tablet by mouth Daily. 90 tablet 3   • [DISCONTINUED] amLODIPine (NORVASC) 2.5 MG tablet Take 1 tablet by mouth  Daily As Needed (hypertension). 90 tablet 3   • [DISCONTINUED] carvedilol (COREG) 6.25 MG tablet Take 1 tablet by mouth 2 (Two) Times a Day With Meals. 180 tablet 3   • [DISCONTINUED] clopidogrel (PLAVIX) 75 MG tablet Take 1 tablet by mouth Daily. 90 tablet 3   • [DISCONTINUED] furosemide (LASIX) 40 MG tablet Take 1 tablet by mouth 2 (Two) Times a Day. 180 tablet 3   • [DISCONTINUED] lisinopril (PRINIVIL,ZESTRIL) 5 MG tablet Take 2 tablets by mouth Daily. 180 tablet 3   • [DISCONTINUED] potassium chloride (K-DUR) 10 MEQ CR tablet Take 20 Meq daily and then take additional 20 meq with additional lasix 180 tablet 5   • [DISCONTINUED] simvastatin (ZOCOR) 10 MG tablet Take 1 tablet by mouth Every Evening. 90 tablet 3   • escitalopram (LEXAPRO) 5 MG tablet Take 5 mg by mouth Daily.     • [DISCONTINUED] azithromycin (ZITHROMAX) 250 MG tablet Take 2 tablets the first day, then 1 tablet daily for 4 days. 6 tablet 0     No current facility-administered medications on file prior to visit.        ALLERGIES    Patient has no known allergies.    Past Medical History:   Diagnosis Date   • 2nd degree atrioventricular block 9/19/2016   • Acute renal insufficiency    • Anxiety    • Arthritis    • Asthma    • Atrial fibrillation (CMS/[facility]) 9/19/2016   • Benign prostatic hypertrophy with urinary obstruction 9/19/2016   • Bradycardia 9/19/2016   • CAD (coronary artery disease), native coronary artery 9/19/2016   • Chest pain 9/19/2016   • Congestive heart failure (CMS/[facility]) 9/19/2016   • COPD (chronic obstructive pulmonary disease) (CMS/[facility])    • Degeneration of cervical intervertebral disc    • Depression    • Dyslipidemia 9/19/2016   • Hiatal hernia    • Hyperlipidemia    • Hypertension    • Hypogonadism male 9/19/2016   • Incomplete emptying of bladder 9/19/2016   • Male erectile disorder of organic origin 9/19/2016   • Myocardial infarction (CMS/[facility])    • Sleep apnea    • Thrombocytopenia (CMS/[facility]) 9/19/2016   • Urinary hesitancy  9/19/2016       Social History     Socioeconomic History   • Marital status: Single     Spouse name: Not on file   • Number of children: Not on file   • Years of education: Not on file   • Highest education level: Not on file   Tobacco Use   • Smoking status: Former Smoker     Packs/day: 0.50     Types: Cigarettes   • Smokeless tobacco: Never Used   Substance and Sexual Activity   • Alcohol use: No   • Drug use: No   • Sexual activity: Defer       Family History   Problem Relation Age of Onset   • Diabetes Son    • No Known Problems Mother    • No Known Problems Father        Review of Systems   Constitutional: Positive for fatigue (stay tired). Negative for chills, diaphoresis and fever.   HENT: Positive for rhinorrhea (allergies). Negative for congestion and sore throat.    Eyes: Positive for visual disturbance (wears glasses daily).   Respiratory: Positive for chest tightness (when laying down at night) and shortness of breath (a little, has to stop and rest when walking to garage ). Negative for cough and wheezing.    Cardiovascular: Positive for leg swelling (sometimes- lasix will help). Negative for chest pain and palpitations.   Gastrointestinal: Negative for abdominal pain, blood in stool, nausea and vomiting.   Endocrine: Negative for cold intolerance and heat intolerance.   Genitourinary: Negative for dysuria, frequency, hematuria and urgency.   Musculoskeletal: Positive for arthralgias (joint pain) and back pain (low back pain). Negative for neck pain.   Skin: Negative for rash and wound.   Allergic/Immunologic: Positive for environmental allergies (seasonal). Negative for food allergies.   Neurological: Positive for dizziness (when sitting still and standing up moving around). Negative for weakness and light-headedness.   Hematological: Bruises/bleeds easily (brusing ).   Psychiatric/Behavioral: Negative for sleep disturbance (denies waking with smothering ).       Objective   /82 (BP Location:  "Left arm, Patient Position: Sitting)   Pulse 73   Ht 175.3 cm (69\")   Wt 90.1 kg (198 lb 9.6 oz)   SpO2 94%   BMI 29.33 kg/m²   Vitals:    08/28/20 0938   BP: 142/82   BP Location: Left arm   Patient Position: Sitting   Pulse: 73   SpO2: 94%   Weight: 90.1 kg (198 lb 9.6 oz)   Height: 175.3 cm (69\")      Lab Results (most recent)     None        Physical Exam   Constitutional: He is oriented to person, place, and time. Vital signs are normal. He appears well-developed and well-nourished. He is active and cooperative.   HENT:   Head: Normocephalic.   Eyes: Lids are normal.   Wears glasses    Cardiovascular: Normal rate, regular rhythm and normal heart sounds.   Pulses:       Radial pulses are 2+ on the right side, and 2+ on the left side.        Dorsalis pedis pulses are 2+ on the right side, and 2+ on the left side.        Posterior tibial pulses are 2+ on the right side, and 2+ on the left side.   Trace - 1+ edema BLE.   Pulmonary/Chest: Effort normal.   Coarse breath sounds   Abdominal: Normal appearance and bowel sounds are normal.   Neurological: He is alert and oriented to person, place, and time.   Skin: Skin is warm, dry and intact.   AICD STEPH    Psychiatric: He has a normal mood and affect. His speech is normal and behavior is normal. Judgment and thought content normal. Cognition and memory are normal.       Procedure     ECG 12 Lead  Date/Time: 8/28/2020 10:07 AM  Performed by: Radha Rajput APRN  Authorized by: Radha Rajput APRN   Comparison: compared with previous ECG from 9/27/2019  Similar to previous ECG  Rhythm: paced  Rate: normal  BPM: 70  QRS axis: left    Clinical impression: abnormal EKG                 Assessment/Plan      Diagnosis Plan   1. Coronary artery disease involving native coronary artery of native heart with angina pectoris (CMS/HCC)  ECG 12 Lead    carvedilol (COREG) 6.25 MG tablet    clopidogrel (PLAVIX) 75 MG tablet    Stress Test With Myocardial Perfusion One Day    " Adult Transthoracic Echo Complete W/ Cont if Necessary Per Protocol    Lipid Panel   2. Ischemic cardiomyopathy  potassium chloride (K-DUR) 10 MEQ CR tablet    Stress Test With Myocardial Perfusion One Day    Adult Transthoracic Echo Complete W/ Cont if Necessary Per Protocol   3. Essential hypertension  ECG 12 Lead    lisinopril (PRINIVIL,ZESTRIL) 5 MG tablet    Comprehensive Metabolic Panel    Stress Test With Myocardial Perfusion One Day   4. Pacemaker     5. Paroxysmal atrial fibrillation (CMS/HCC)  ECG 12 Lead    amiodarone (PACERONE) 200 MG tablet    Magnesium    TSH    Stress Test With Myocardial Perfusion One Day    Adult Transthoracic Echo Complete W/ Cont if Necessary Per Protocol   6. Shortness of breath  BNP    CBC & Differential    Stress Test With Myocardial Perfusion One Day    Adult Transthoracic Echo Complete W/ Cont if Necessary Per Protocol   7. Dyslipidemia  simvastatin (ZOCOR) 10 MG tablet    Lipid Panel   8. Dizziness     9. Peripheral edema  BNP   10. Grade I diastolic dysfunction     11. Chest tightness  BNP    Stress Test With Myocardial Perfusion One Day    Adult Transthoracic Echo Complete W/ Cont if Necessary Per Protocol   12. Cardiomyopathy, unspecified type (CMS/HCC)  furosemide (LASIX) 40 MG tablet   13. Healthcare maintenance  Magnesium    CBC & Differential    TSH    Comprehensive Metabolic Panel    Lipid Panel   14. Bronchitis  methylPREDNISolone (MEDROL) 4 MG dose pack       Return in about 3 months (around 11/28/2020), or with AICD Check .    CAD/ischemic cardiomyopathy/hypertension/A. fib/shortness of breath/dyslipidemia/chest tightness-patient will have a repeat ischemia work-up, stress and echo.  He will get a BMP, mag, CBC, TSH and CMP as well as lipids.  He was encouraged to take his diuretics as prescribed.  Bronchitis-sent in a Medrol Dosepak.  He will continue his medication regimen.  He will follow-up in 3 months with device check or sooner if any changes.  I advised  that soon as we have labs we will call with those results.       Kandice Nuñez  reports that he has quit smoking. His smoking use included cigarettes. He smoked 0.50 packs per day. He has never used smokeless tobacco.         Patient's Body mass index is 29.33 kg/m². BMI is above normal parameters. Recommendations include: educational material and referral to primary care.      Electronically signed by:

## 2020-08-28 NOTE — PATIENT INSTRUCTIONS
"Fat and Cholesterol Restricted Eating Plan  Getting too much fat and cholesterol in your diet may cause health problems. Choosing the right foods helps keep your fat and cholesterol at normal levels. This can keep you from getting certain diseases.  Your doctor may recommend an eating plan that includes:  · Total fat: ______% or less of total calories a day.  · Saturated fat: ______% or less of total calories a day.  · Cholesterol: less than _________mg a day.  · Fiber: ______g a day.  What are tips for following this plan?  Meal planning  · At meals, divide your plate into four equal parts:  ? Fill one-half of your plate with vegetables and green salads.  ? Fill one-fourth of your plate with whole grains.  ? Fill one-fourth of your plate with low-fat (lean) protein foods.  · Eat fish that is high in omega-3 fats at least two times a week. This includes mackerel, tuna, sardines, and salmon.  · Eat foods that are high in fiber, such as whole grains, beans, apples, broccoli, carrots, peas, and barley.  General tips    · Work with your doctor to lose weight if you need to.  · Avoid:  ? Foods with added sugar.  ? Fried foods.  ? Foods with partially hydrogenated oils.  · Limit alcohol intake to no more than 1 drink a day for nonpregnant women and 2 drinks a day for men. One drink equals 12 oz of beer, 5 oz of wine, or 1½ oz of hard liquor.  Reading food labels  · Check food labels for:  ? Trans fats.  ? Partially hydrogenated oils.  ? Saturated fat (g) in each serving.  ? Cholesterol (mg) in each serving.  ? Fiber (g) in each serving.  · Choose foods with healthy fats, such as:  ? Monounsaturated fats.  ? Polyunsaturated fats.  ? Omega-3 fats.  · Choose grain products that have whole grains. Look for the word \"whole\" as the first word in the ingredient list.  Cooking  · Cook foods using low-fat methods. These include baking, boiling, grilling, and broiling.  · Eat more home-cooked foods. Eat at restaurants and buffets " less often.  · Avoid cooking using saturated fats, such as butter, cream, palm oil, palm kernel oil, and coconut oil.  Recommended foods    Fruits  · All fresh, canned (in natural juice), or frozen fruits.  Vegetables  · Fresh or frozen vegetables (raw, steamed, roasted, or grilled). Green salads.  Grains  · Whole grains, such as whole wheat or whole grain breads, crackers, cereals, and pasta. Unsweetened oatmeal, bulgur, barley, quinoa, or brown rice. Corn or whole wheat flour tortillas.  Meats and other protein foods  · Ground beef (85% or leaner), grass-fed beef, or beef trimmed of fat. Skinless chicken or turkey. Ground chicken or turkey. Pork trimmed of fat. All fish and seafood. Egg whites. Dried beans, peas, or lentils. Unsalted nuts or seeds. Unsalted canned beans. Nut butters without added sugar or oil.  Dairy  · Low-fat or nonfat dairy products, such as skim or 1% milk, 2% or reduced-fat cheeses, low-fat and fat-free ricotta or cottage cheese, or plain low-fat and nonfat yogurt.  Fats and oils  · Tub margarine without trans fats. Light or reduced-fat mayonnaise and salad dressings. Avocado. Olive, canola, sesame, or safflower oils.  The items listed above may not be a complete list of foods and beverages you can eat. Contact a dietitian for more information.  Foods to avoid  Fruits  · Canned fruit in heavy syrup. Fruit in cream or butter sauce. Fried fruit.  Vegetables  · Vegetables cooked in cheese, cream, or butter sauce. Fried vegetables.  Grains  · White bread. White pasta. White rice. Cornbread. Bagels, pastries, and croissants. Crackers and snack foods that contain trans fat and hydrogenated oils.  Meats and other protein foods  · Fatty cuts of meat. Ribs, chicken wings, collins, sausage, bologna, salami, chitterlings, fatback, hot dogs, bratwurst, and packaged lunch meats. Liver and organ meats. Whole eggs and egg yolks. Chicken and turkey with skin. Fried meat.  Dairy  · Whole or 2% milk, cream,  half-and-half, and cream cheese. Whole milk cheeses. Whole-fat or sweetened yogurt. Full-fat cheeses. Nondairy creamers and whipped toppings. Processed cheese, cheese spreads, and cheese curds.  Beverages  · Alcohol. Sugar-sweetened drinks such as sodas, lemonade, and fruit drinks.  Fats and oils  · Butter, stick margarine, lard, shortening, ghee, or collins fat. Coconut, palm kernel, and palm oils.  Sweets and desserts  · Corn syrup, sugars, honey, and molasses. Candy. Jam and jelly. Syrup. Sweetened cereals. Cookies, pies, cakes, donuts, muffins, and ice cream.  The items listed above may not be a complete list of foods and beverages you should avoid. Contact a dietitian for more information.  Summary  · Choosing the right foods helps keep your fat and cholesterol at normal levels. This can keep you from getting certain diseases.  · At meals, fill one-half of your plate with vegetables and green salads.  · Eat high-fiber foods, like whole grains, beans, apples, carrots, peas, and barley.  · Limit added sugar, saturated fats, alcohol, and fried foods.  This information is not intended to replace advice given to you by your health care provider. Make sure you discuss any questions you have with your health care provider.  Document Released: 06/18/2013 Document Revised: 08/21/2019 Document Reviewed: 09/04/2018  [employer] Patient Education © 2020 [employer]  BMI for Adults    Body mass index (BMI) is a number that is calculated from a person's weight and height. BMI may help to estimate how much of a person's weight is composed of fat. BMI can help identify those who may be at higher risk for certain medical problems.  How is BMI used with adults?  BMI is used as a screening tool to identify possible weight problems. It is used to check whether a person is obese, overweight, healthy weight, or underweight.  How is BMI calculated?  BMI measures your weight and compares it to your height. This can be done either in  "English ([place] or metric measurements. Note that charts are available to help you find your BMI quickly and easily without having to do these calculations yourself.  To calculate your BMI in English ([place] measurements, your health care provider will:  1. Measure your weight in pounds (lb).  2. Multiply the number of pounds by 703.  ? For example, for a person who weighs 180 lb, multiply that number by 703, which equals 126,540.  3. Measure your height in inches (in). Then multiply that number by itself to get a measurement called \"inches squared.\"  ? For example, for a person who is 70 in tall, the \"inches squared\" measurement is 70 in x 70 in, which equals 4900 inches squared.  4. Divide the total from Step 2 (number of lb x 703) by the total from Step 3 (inches squared): 126,540 ÷ 4900 = 25.8. This is your BMI.  To calculate your BMI in metric measurements, your health care provider will:  1. Measure your weight in kilograms (kg).  2. Measure your height in meters (m). Then multiply that number by itself to get a measurement called \"meters squared.\"  ? For example, for a person who is 1.75 m tall, the \"meters squared\" measurement is 1.75 m x 1.75 m, which is equal to 3.1 meters squared.  3. Divide the number of kilograms (your weight) by the meters squared number. In this example: 70 ÷ 3.1 = 22.6. This is your BMI.  How is BMI interpreted?  To interpret your results, your health care provider will use BMI charts to identify whether you are underweight, normal weight, overweight, or obese. The following guidelines will be used:  · Underweight: BMI less than 18.5.  · Normal weight: BMI between 18.5 and 24.9.  · Overweight: BMI between 25 and 29.9.  · Obese: BMI of 30 and above.  Please note:  · Weight includes both fat and muscle, so someone with a muscular build, such as an athlete, may have a BMI that is higher than 24.9. In cases like these, BMI is not an accurate measure of body fat.  · To determine if excess " body fat is the cause of a BMI of 25 or higher, further assessments may need to be done by a health care provider.  · BMI is usually interpreted in the same way for men and women.  Why is BMI a useful tool?  BMI is useful in two ways:  · Identifying a weight problem that may be related to a medical condition, or that may increase the risk for medical problems.  · Promoting lifestyle and diet changes in order to reach a healthy weight.  Summary  · Body mass index (BMI) is a number that is calculated from a person's weight and height.  · BMI may help to estimate how much of a person's weight is composed of fat. BMI can help identify those who may be at higher risk for certain medical problems.  · BMI can be measured using English measurements or metric measurements.  · To interpret your results, your health care provider will use BMI charts to identify whether you are underweight, normal weight, overweight, or obese.  This information is not intended to replace advice given to you by your health care provider. Make sure you discuss any questions you have with your health care provider.  Document Released: 08/29/2005 Document Revised: 11/30/2018 Document Reviewed: 10/31/2018  [employer] Patient Education © 2020 [employer]    Edema    Edema is an abnormal buildup of fluids in the body tissues and under the skin. Swelling of the legs, feet, and ankles is a common symptom that becomes more likely as you get older. Swelling is also common in looser tissues, like around the eyes. When the affected area is squeezed, the fluid may move out of that spot and leave a dent for a few moments. This dent is called pitting edema.  There are many possible causes of edema. Eating too much salt (sodium) and being on your feet or sitting for a long time can cause edema in your legs, feet, and ankles. Hot weather may make edema worse. Common causes of edema include:  · Heart failure.  · Liver or kidney disease.  · Weak leg blood  vessels.  · Cancer.  · An injury.  · Pregnancy.  · Medicines.  · Being obese.  · Low protein levels in the blood.  Edema is usually painless. Your skin may look swollen or shiny.  Follow these instructions at home:  · Keep the affected body part raised (elevated) above the level of your heart when you are sitting or lying down.  · Do not sit still or stand for long periods of time.  · Do not wear tight clothing. Do not wear garters on your upper legs.  · Exercise your legs to get your circulation going. This helps to move the fluid back into your blood vessels, and it may help the swelling go down.  · Wear elastic bandages or support stockings to reduce swelling as told by your health care provider.  · Eat a low-salt (low-sodium) diet to reduce fluid as told by your health care provider.  · Depending on the cause of your swelling, you may need to limit how much fluid you drink (fluid restriction).  · Take over-the-counter and prescription medicines only as told by your health care provider.  Contact a health care provider if:  · Your edema does not get better with treatment.  · You have heart, liver, or kidney disease and have symptoms of edema.  · You have sudden and unexplained weight gain.  Get help right away if:  · You develop shortness of breath or chest pain.  · You cannot breathe when you lie down.  · You develop pain, redness, or warmth in the swollen areas.  · You have heart, liver, or kidney disease and suddenly get edema.  · You have a fever and your symptoms suddenly get worse.  Summary  · Edema is an abnormal buildup of fluids in the body tissues and under the skin.  · Eating too much salt (sodium) and being on your feet or sitting for a long time can cause edema in your legs, feet, and ankles.  · Keep the affected body part raised (elevated) above the level of your heart when you are sitting or lying down.  This information is not intended to replace advice given to you by your health care provider.  Make sure you discuss any questions you have with your health care provider.  Document Released: 12/18/2006 Document Revised: 05/06/2020 Document Reviewed: 01/20/2018  [employer] Patient Education © 2020 [employer]

## 2020-11-30 ENCOUNTER — TELEPHONE (OUTPATIENT)
Dept: CARDIOLOGY | Facility: CLINIC | Age: 75
End: 2020-11-30

## 2020-11-30 DIAGNOSIS — R06.02 SHORTNESS OF BREATH: Primary | ICD-10-CM

## 2020-11-30 DIAGNOSIS — R60.9 PERIPHERAL EDEMA: ICD-10-CM

## 2020-12-01 ENCOUNTER — OFFICE VISIT (OUTPATIENT)
Dept: CARDIOLOGY | Facility: CLINIC | Age: 75
End: 2020-12-01

## 2020-12-01 ENCOUNTER — LAB (OUTPATIENT)
Dept: CARDIOLOGY | Facility: CLINIC | Age: 75
End: 2020-12-01

## 2020-12-01 VITALS
SYSTOLIC BLOOD PRESSURE: 109 MMHG | BODY MASS INDEX: 29.33 KG/M2 | DIASTOLIC BLOOD PRESSURE: 59 MMHG | OXYGEN SATURATION: 99 % | HEART RATE: 75 BPM | HEIGHT: 69 IN

## 2020-12-01 DIAGNOSIS — I42.9 CARDIOMYOPATHY, UNSPECIFIED TYPE (HCC): ICD-10-CM

## 2020-12-01 DIAGNOSIS — R60.9 PERIPHERAL EDEMA: ICD-10-CM

## 2020-12-01 DIAGNOSIS — I25.5 ISCHEMIC CARDIOMYOPATHY: ICD-10-CM

## 2020-12-01 DIAGNOSIS — I48.0 PAROXYSMAL ATRIAL FIBRILLATION (HCC): ICD-10-CM

## 2020-12-01 DIAGNOSIS — R07.89 OTHER CHEST PAIN: Primary | ICD-10-CM

## 2020-12-01 DIAGNOSIS — R42 DIZZINESS: ICD-10-CM

## 2020-12-01 DIAGNOSIS — E78.5 DYSLIPIDEMIA: ICD-10-CM

## 2020-12-01 DIAGNOSIS — I25.119 CORONARY ARTERY DISEASE INVOLVING NATIVE CORONARY ARTERY OF NATIVE HEART WITH ANGINA PECTORIS (HCC): ICD-10-CM

## 2020-12-01 DIAGNOSIS — N40.1 BENIGN PROSTATIC HYPERPLASIA WITH LOWER URINARY TRACT SYMPTOMS, SYMPTOM DETAILS UNSPECIFIED: ICD-10-CM

## 2020-12-01 DIAGNOSIS — I65.23 BILATERAL CAROTID ARTERY STENOSIS: ICD-10-CM

## 2020-12-01 DIAGNOSIS — R06.02 SHORTNESS OF BREATH: ICD-10-CM

## 2020-12-01 DIAGNOSIS — Z00.00 HEALTHCARE MAINTENANCE: ICD-10-CM

## 2020-12-01 DIAGNOSIS — I50.20 SYSTOLIC CONGESTIVE HEART FAILURE, UNSPECIFIED HF CHRONICITY (HCC): ICD-10-CM

## 2020-12-01 DIAGNOSIS — I10 ESSENTIAL HYPERTENSION: ICD-10-CM

## 2020-12-01 DIAGNOSIS — Z95.0 PACEMAKER: ICD-10-CM

## 2020-12-01 DIAGNOSIS — I51.89 GRADE I DIASTOLIC DYSFUNCTION: ICD-10-CM

## 2020-12-01 DIAGNOSIS — I44.1 2ND DEGREE ATRIOVENTRICULAR BLOCK: ICD-10-CM

## 2020-12-01 LAB
ALBUMIN SERPL-MCNC: 3.32 G/DL (ref 3.5–5.2)
ALBUMIN/GLOB SERPL: 1.4 G/DL
ALP SERPL-CCNC: 94 U/L (ref 39–117)
ALT SERPL W P-5'-P-CCNC: 29 U/L (ref 1–41)
ANION GAP SERPL CALCULATED.3IONS-SCNC: 9.5 MMOL/L (ref 5–15)
ANISOCYTOSIS BLD QL: NORMAL
AST SERPL-CCNC: 29 U/L (ref 1–40)
BASOPHILS # BLD AUTO: 0.02 10*3/MM3 (ref 0–0.2)
BASOPHILS NFR BLD AUTO: 0.1 % (ref 0–1.5)
BILIRUB SERPL-MCNC: 1 MG/DL (ref 0–1.2)
BUN SERPL-MCNC: 24 MG/DL (ref 8–23)
BUN/CREAT SERPL: 21.4 (ref 7–25)
CALCIUM SPEC-SCNC: 8.7 MG/DL (ref 8.6–10.5)
CHLORIDE SERPL-SCNC: 96 MMOL/L (ref 98–107)
CHOLEST SERPL-MCNC: 137 MG/DL (ref 0–200)
CO2 SERPL-SCNC: 33.5 MMOL/L (ref 22–29)
CREAT SERPL-MCNC: 1.12 MG/DL (ref 0.76–1.27)
DEPRECATED RDW RBC AUTO: 56.2 FL (ref 37–54)
ELLIPTOCYTES BLD QL SMEAR: NORMAL
EOSINOPHIL # BLD AUTO: 0.04 10*3/MM3 (ref 0–0.4)
EOSINOPHIL NFR BLD AUTO: 0.2 % (ref 0.3–6.2)
ERYTHROCYTE [DISTWIDTH] IN BLOOD BY AUTOMATED COUNT: 22.5 % (ref 12.3–15.4)
GFR SERPL CREATININE-BSD FRML MDRD: 64 ML/MIN/1.73
GLOBULIN UR ELPH-MCNC: 2.4 GM/DL
GLUCOSE SERPL-MCNC: 101 MG/DL (ref 65–99)
HCT VFR BLD AUTO: 33.9 % (ref 37.5–51)
HDLC SERPL-MCNC: 71 MG/DL (ref 40–60)
HGB BLD-MCNC: 8.9 G/DL (ref 13–17.7)
HYPOCHROMIA BLD QL: NORMAL
IMM GRANULOCYTES # BLD AUTO: 0.2 10*3/MM3 (ref 0–0.05)
IMM GRANULOCYTES NFR BLD AUTO: 1.1 % (ref 0–0.5)
LDLC SERPL CALC-MCNC: 39 MG/DL (ref 0–100)
LDLC/HDLC SERPL: 0.46 {RATIO}
LYMPHOCYTES # BLD AUTO: 1.01 10*3/MM3 (ref 0.7–3.1)
LYMPHOCYTES NFR BLD AUTO: 5.5 % (ref 19.6–45.3)
MAGNESIUM SERPL-MCNC: 2.2 MG/DL (ref 1.6–2.4)
MCH RBC QN AUTO: 18.8 PG (ref 26.6–33)
MCHC RBC AUTO-ENTMCNC: 26.3 G/DL (ref 31.5–35.7)
MCV RBC AUTO: 71.7 FL (ref 79–97)
MICROCYTES BLD QL: NORMAL
MONOCYTES # BLD AUTO: 1.02 10*3/MM3 (ref 0.1–0.9)
MONOCYTES NFR BLD AUTO: 5.6 % (ref 5–12)
NEUTROPHILS NFR BLD AUTO: 16.05 10*3/MM3 (ref 1.7–7)
NEUTROPHILS NFR BLD AUTO: 87.5 % (ref 42.7–76)
NRBC BLD AUTO-RTO: 0.2 /100 WBC (ref 0–0.2)
NT-PROBNP SERPL-MCNC: 2232 PG/ML (ref 0–1800)
PLAT MORPH BLD: NORMAL
PLATELET # BLD AUTO: 178 10*3/MM3 (ref 140–450)
PMV BLD AUTO: 10.5 FL (ref 6–12)
POLYCHROMASIA BLD QL SMEAR: NORMAL
POTASSIUM SERPL-SCNC: 4.3 MMOL/L (ref 3.5–5.2)
PROT SERPL-MCNC: 5.7 G/DL (ref 6–8.5)
RBC # BLD AUTO: 4.73 10*6/MM3 (ref 4.14–5.8)
SODIUM SERPL-SCNC: 139 MMOL/L (ref 136–145)
TARGETS BLD QL SMEAR: NORMAL
TRIGL SERPL-MCNC: 165 MG/DL (ref 0–150)
TSH SERPL DL<=0.05 MIU/L-ACNC: 5.53 UIU/ML (ref 0.27–4.2)
VLDLC SERPL-MCNC: 27 MG/DL (ref 5–40)
WBC # BLD AUTO: 18.34 10*3/MM3 (ref 3.4–10.8)

## 2020-12-01 PROCEDURE — 80053 COMPREHEN METABOLIC PANEL: CPT | Performed by: NURSE PRACTITIONER

## 2020-12-01 PROCEDURE — 83735 ASSAY OF MAGNESIUM: CPT | Performed by: NURSE PRACTITIONER

## 2020-12-01 PROCEDURE — 36415 COLL VENOUS BLD VENIPUNCTURE: CPT

## 2020-12-01 PROCEDURE — 99214 OFFICE O/P EST MOD 30 MIN: CPT | Performed by: NURSE PRACTITIONER

## 2020-12-01 PROCEDURE — 80061 LIPID PANEL: CPT | Performed by: NURSE PRACTITIONER

## 2020-12-01 PROCEDURE — 83880 ASSAY OF NATRIURETIC PEPTIDE: CPT | Performed by: NURSE PRACTITIONER

## 2020-12-01 PROCEDURE — 84443 ASSAY THYROID STIM HORMONE: CPT | Performed by: NURSE PRACTITIONER

## 2020-12-01 PROCEDURE — 85025 COMPLETE CBC W/AUTO DIFF WBC: CPT | Performed by: NURSE PRACTITIONER

## 2020-12-01 PROCEDURE — 85007 BL SMEAR W/DIFF WBC COUNT: CPT | Performed by: NURSE PRACTITIONER

## 2020-12-01 RX ORDER — AMLODIPINE BESYLATE 2.5 MG/1
2.5 TABLET ORAL DAILY
Qty: 90 TABLET | Refills: 3 | Status: SHIPPED | OUTPATIENT
Start: 2020-12-01

## 2020-12-01 RX ORDER — CARVEDILOL 6.25 MG/1
6.25 TABLET ORAL 2 TIMES DAILY WITH MEALS
Qty: 180 TABLET | Refills: 3 | Status: SHIPPED | OUTPATIENT
Start: 2020-12-01

## 2020-12-01 RX ORDER — POTASSIUM CHLORIDE 750 MG/1
TABLET, FILM COATED, EXTENDED RELEASE ORAL
Qty: 180 TABLET | Refills: 3 | Status: SHIPPED | OUTPATIENT
Start: 2020-12-01

## 2020-12-01 RX ORDER — LISINOPRIL 5 MG/1
10 TABLET ORAL DAILY
Qty: 180 TABLET | Refills: 3 | Status: SHIPPED | OUTPATIENT
Start: 2020-12-01

## 2020-12-01 RX ORDER — NITROGLYCERIN 0.4 MG/1
TABLET SUBLINGUAL
Qty: 30 TABLET | Refills: 5 | Status: SHIPPED | OUTPATIENT
Start: 2020-12-01 | End: 2021-05-18

## 2020-12-01 RX ORDER — FUROSEMIDE 40 MG/1
40 TABLET ORAL 2 TIMES DAILY
Qty: 180 TABLET | Refills: 3 | Status: SHIPPED | OUTPATIENT
Start: 2020-12-01

## 2020-12-01 RX ORDER — SIMVASTATIN 10 MG
10 TABLET ORAL EVERY EVENING
Qty: 90 TABLET | Refills: 3 | Status: SHIPPED | OUTPATIENT
Start: 2020-12-01

## 2020-12-01 RX ORDER — CLOPIDOGREL BISULFATE 75 MG/1
75 TABLET ORAL DAILY
Qty: 90 TABLET | Refills: 3 | Status: SHIPPED | OUTPATIENT
Start: 2020-12-01

## 2020-12-01 RX ORDER — AMIODARONE HYDROCHLORIDE 200 MG/1
200 TABLET ORAL DAILY
Qty: 90 TABLET | Refills: 3 | Status: SHIPPED | OUTPATIENT
Start: 2020-12-01

## 2020-12-01 RX ORDER — TAMSULOSIN HYDROCHLORIDE 0.4 MG/1
1 CAPSULE ORAL DAILY
Qty: 90 CAPSULE | Refills: 3 | Status: SHIPPED | OUTPATIENT
Start: 2020-12-01

## 2020-12-01 NOTE — PROGRESS NOTES
Chito Nuñez is a 75 y.o. male     Chief Complaint   Patient presents with   • Follow-up       HPI    Problem List:    1. CAD  1.1 [facility] 2012 - LT Main 10-20%; LAD 40-50%; Circ 100%; RCA 20-30% RCA and 30-40%; medical management   1.2 Stress Test 1/11/17 - Anterior ischemia; cannot rule out inferior as well; decreased LVEF; intermediate risk   1.3 [facility] 1/30/17 - Stent to RCA; EF 20-25%  2. Hypertension  3. Atrial Flutter Paroxysmal CHADS 2  3.1 No anticoagulation due to GIB   3.2 Event Monitor 12/15-12/28/16 - Atrial Fib, atrial flutter, Pacemaker tachycardia   4. 2:1 AVB with presyncope/Bradycardia symptomatic   4.1 Dual chamber PPM Guidant 10/8/14  5. Dyslipidemia   6. Shortness of breath  6.1 Echo 3/11/14 - mod LVH; EF 40-50%; mild left ventricular hypokinesis; mild MR; PA 36  6.2 Echo 1/11/17 - mild LVH; EF 35-40%; DD II; mild MR and TR; PA 35-40  6.3 Echo 5/24/18-mild LVH, EF 35-40%, diastolic dysfunction 1, trace MR, mild dilation of aortic root  6.4 Echo 10/14/2019 -EF 40 to 45%, mild LVH, diastolic dysfunction 2, trivial to mild TR  7. Smoking Habituation quit 9 months ago  8. Cardiomyopathy   8.1 MUGA 3/22/17 - EF 40%  9.  Carotid artery disease   9.1 Carotid Artery US 10/14/2019-less than or equal to 50% stenosis in both common carotid arteries, 16 to 49% stenosis in the mid and distal right internal carotid artery, 50% or less or stenosis in left internal carotid artery, antegrade flow both vertebral arteries    Patient is a 75-year-old male who presents today for follow-up with significant other at his side.  Patient went to the hospital back on November 28 due to increasing shortness of breath and chest discomfort.  He says that all they were concerned about when he was there was the fall that he had sustained.  Patient says he was just diagnosed with COPD exacerbation but he feels like he is in heart failure because how he is felt in the past.  He said he did take 3 Lasix that morning.   Patient's BNP in the hospital was 2197.  Patient had missed his testing appointment due to the fact that they had forgotten.  He does continue to have what he describes as tightness in his chest when he is lying in bed midsternum.  He says he is unsure if it radiates but is just glad when he quits.  He did not have any nitro so he is not taking any.  He says that he did have some nausea when it occurred.  He was also very short of breath.  He says he has palpitations that have not changed.  He says he is dizzy all the time.  He denies any presyncope, syncope or PND.  Patient says he does wake up on occasion smothering.  He is currently sleeping in a hospital bed.  He says that he does have swelling which is gotten worse this past Saturday.  Since his fall he has got major bruising on both feet and his right upper arm.  He has bruising all over his hands as well.  He has swelling on his left upper arm.  He does not recall being giving any IV fluids or blood drawn from that arm.  Patient says he has had a significant increase in shortness of breath.  He stays fatigued as well.  He is to be using oxygen whenever he is exerting himself.    Current Outpatient Medications on File Prior to Visit   Medication Sig Dispense Refill   • albuterol (PROAIR HFA) 108 (90 BASE) MCG/ACT inhaler ProAir  (90 Base) MCG/ACT Inhalation Aerosol Solution; Patient Sig: ProAir  (90 Base) MCG/ACT Inhalation Aerosol Solution INHALE 1 TO 2 PUFFS EVERY 4 TO 6 HOURS AS NEEDED.; 0; 23-Jul-2013; Active     • aspirin 81 MG tablet Take 1 tablet by mouth Daily. 90 tablet 3   • buprenorphine-naloxone (SUBOXONE) 8-2 MG per SL tablet Place 1 tablet under the tongue 3 (Three) Times a Day.     • COMBIVENT RESPIMAT  MCG/ACT inhaler Inhale 1 puff Daily.     • gabapentin (NEURONTIN) 800 MG tablet Take 800 mg by mouth 3 (Three) Times a Day As Needed.     • ipratropium-albuterol (DUO-NEB) 0.5-2.5 mg/mL nebulizer Ipratropium-Albuterol 0.5-2.5  (3) MG/3ML Inhalation Solution; Patient Sig: Ipratropium-Albuterol 0.5-2.5 (3) MG/3ML Inhalation Solution USE 1 UNIT DOSE IN NEBULIZER EVERY 4 HOURS AS NEEDED.; 0; 23-Jul-2013; Active     • lansoprazole (PREVACID) 30 MG capsule Take 30 mg by mouth Daily.     • methocarbamol (ROBAXIN) 750 MG tablet Take 1 tablet by mouth 4 (Four) Times a Day As Needed.     • polyethylene glycol (MIRALAX) powder Take  by mouth Daily.     • rOPINIRole (REQUIP) 1 MG tablet Take 1 mg by mouth 3 (Three) Times a Day.     • SYMBICORT 160-4.5 MCG/ACT inhaler Inhale 2 puffs Take As Directed.     • [DISCONTINUED] amiodarone (PACERONE) 200 MG tablet Take 1 tablet by mouth Daily. 90 tablet 3   • [DISCONTINUED] amLODIPine (NORVASC) 2.5 MG tablet Take 1 tablet by mouth Daily As Needed (hypertension). 90 tablet 3   • [DISCONTINUED] carvedilol (COREG) 6.25 MG tablet Take 1 tablet by mouth 2 (Two) Times a Day With Meals. 180 tablet 3   • [DISCONTINUED] clopidogrel (PLAVIX) 75 MG tablet Take 1 tablet by mouth Daily. 90 tablet 3   • [DISCONTINUED] escitalopram (LEXAPRO) 5 MG tablet Take 5 mg by mouth Daily.     • [DISCONTINUED] furosemide (LASIX) 40 MG tablet Take 1 tablet by mouth 2 (Two) Times a Day. 180 tablet 3   • [DISCONTINUED] lisinopril (PRINIVIL,ZESTRIL) 5 MG tablet Take 2 tablets by mouth Daily. 180 tablet 3   • [DISCONTINUED] potassium chloride (K-DUR) 10 MEQ CR tablet Take 20 Meq daily and then take additional 20 meq with additional lasix 180 tablet 5   • [DISCONTINUED] simvastatin (ZOCOR) 10 MG tablet Take 1 tablet by mouth Every Evening. 90 tablet 3   • [DISCONTINUED] tamsulosin (FLOMAX) 0.4 MG capsule 24 hr capsule Take 1 capsule by mouth Daily. 30 capsule 0   • [DISCONTINUED] methylPREDNISolone (MEDROL) 4 MG dose pack Take as directed on package instructions. 21 each 0     No current facility-administered medications on file prior to visit.        ALLERGIES    Patient has no known allergies.    Past Medical History:   Diagnosis Date   •  2nd degree atrioventricular block 9/19/2016   • Acute renal insufficiency    • Anxiety    • Arthritis    • Asthma    • Atrial fibrillation (CMS/[facility]) 9/19/2016   • Benign prostatic hypertrophy with urinary obstruction 9/19/2016   • Bradycardia 9/19/2016   • CAD (coronary artery disease), native coronary artery 9/19/2016   • Chest pain 9/19/2016   • Congestive heart failure (CMS/[facility]) 9/19/2016   • COPD (chronic obstructive pulmonary disease) (CMS/[facility])    • Degeneration of cervical intervertebral disc    • Depression    • Dyslipidemia 9/19/2016   • Hiatal hernia    • Hyperlipidemia    • Hypertension    • Hypogonadism male 9/19/2016   • Incomplete emptying of bladder 9/19/2016   • Male erectile disorder of organic origin 9/19/2016   • Myocardial infarction (CMS/[facility])    • Sleep apnea    • Thrombocytopenia (CMS/[facility]) 9/19/2016   • Urinary hesitancy 9/19/2016       Social History     Socioeconomic History   • Marital status: Single     Spouse name: Not on file   • Number of children: Not on file   • Years of education: Not on file   • Highest education level: Not on file   Tobacco Use   • Smoking status: Former Smoker     Packs/day: 0.50     Types: Cigarettes   • Smokeless tobacco: Never Used   Substance and Sexual Activity   • Alcohol use: No   • Drug use: Yes     Types: Marijuana   • Sexual activity: Defer       Family History   Problem Relation Age of Onset   • Diabetes Son    • No Known Problems Mother    • No Known Problems Father        Review of Systems   Constitutional: Positive for fatigue (stays fatigued). Negative for appetite change, chills, diaphoresis and fever.   HENT: Positive for rhinorrhea (allergies). Negative for congestion and sore throat.    Eyes: Positive for visual disturbance (glasses).   Respiratory: Positive for cough, chest tightness (pressure), shortness of breath (worsening) and wheezing.         Wears O2   Cardiovascular: Positive for chest pain (tightness in chest when lying in bed  "midsternum; unsure if it radiates; glads that it quits ), palpitations and leg swelling (worse starting saturday, this past saturday ).   Gastrointestinal: Positive for nausea (with CP ). Negative for abdominal pain, blood in stool and vomiting.   Endocrine: Positive for cold intolerance (cold most of the time). Negative for heat intolerance.   Genitourinary: Negative for dysuria, frequency, hematuria and urgency.   Musculoskeletal: Positive for arthralgias (joints) and back pain.   Skin: Positive for color change (BLE s/p fall 11/10/2020, turned blue sat). Negative for rash and wound.   Allergic/Immunologic: Positive for environmental allergies (seasonal). Negative for food allergies.   Neurological: Positive for dizziness (all the time; anytime ) and weakness. Negative for light-headedness.   Hematological: Bruises/bleeds easily.   Psychiatric/Behavioral: Positive for sleep disturbance (admits to waking with smothering at night  ).       Objective   /59 (BP Location: Left arm, Patient Position: Sitting)   Pulse 75   Ht 175.3 cm (69\")   SpO2 99%   BMI 29.33 kg/m²   Vitals:    12/01/20 1413   BP: 109/59   BP Location: Left arm   Patient Position: Sitting   Pulse: 75   SpO2: 99%   Height: 175.3 cm (69\")      Lab Results (most recent)     None        Physical Exam  Vitals signs reviewed.   Constitutional:       General: He is awake.      Appearance: Normal appearance. He is well-developed and well-groomed.   HENT:      Head: Normocephalic.      Mouth/Throat:      Dentition: Abnormal dentition (edentulous ).   Eyes:      General: Lids are normal.      Comments: Wears glasses    Neck:      Vascular: No carotid bruit, hepatojugular reflux or JVD.   Cardiovascular:      Rate and Rhythm: Normal rate and regular rhythm.      Pulses:           Radial pulses are 2+ on the right side and 2+ on the left side.        Dorsalis pedis pulses are 2+ on the right side and 2+ on the left side.        Posterior tibial pulses " are 2+ on the right side and 2+ on the left side.      Heart sounds: Normal heart sounds.   Pulmonary:      Effort: Pulmonary effort is normal.      Breath sounds: Normal air entry. Examination of the right-lower field reveals decreased breath sounds. Examination of the left-lower field reveals decreased breath sounds and wheezing. Decreased breath sounds and wheezing present.      Comments: Course breath sounds RLL and LLL; O2 2L NC  Abdominal:      General: Bowel sounds are normal.      Palpations: Abdomen is soft.   Musculoskeletal:      Right lower le+ Pitting Edema present.      Left lower le+ Pitting Edema present.   Skin:     General: Skin is cool.      Findings: Ecchymosis (BUE and BLE ) and erythema (LUE, forearm ) present.   Neurological:      Mental Status: He is alert and oriented to person, place, and time.   Psychiatric:         Attention and Perception: Attention and perception normal.         Mood and Affect: Mood and affect normal.         Speech: Speech normal.         Behavior: Behavior normal. Behavior is cooperative.         Thought Content: Thought content normal.         Cognition and Memory: Cognition and memory normal.         Judgment: Judgment normal.         Procedure   Procedures         Assessment/Plan      Diagnosis Plan   1. Other chest pain  nitroglycerin (NITROSTAT) 0.4 MG SL tablet    Adult Transthoracic Echo Complete W/ Cont if Necessary Per Protocol    Stress Test With Myocardial Perfusion One Day   2. Coronary artery disease involving native coronary artery of native heart with angina pectoris (CMS/HCC)  carvedilol (COREG) 6.25 MG tablet    clopidogrel (PLAVIX) 75 MG tablet    Adult Transthoracic Echo Complete W/ Cont if Necessary Per Protocol    Stress Test With Myocardial Perfusion One Day   3. 2nd degree atrioventricular block     4. Systolic congestive heart failure, unspecified HF chronicity (CMS/HCC)  Adult Transthoracic Echo Complete W/ Cont if Necessary Per  Protocol    Stress Test With Myocardial Perfusion One Day   5. Pacemaker     6. Essential hypertension  amLODIPine (NORVASC) 2.5 MG tablet    lisinopril (PRINIVIL,ZESTRIL) 5 MG tablet    Adult Transthoracic Echo Complete W/ Cont if Necessary Per Protocol    Stress Test With Myocardial Perfusion One Day   7. Ischemic cardiomyopathy  potassium chloride 10 MEQ CR tablet    Adult Transthoracic Echo Complete W/ Cont if Necessary Per Protocol    Stress Test With Myocardial Perfusion One Day   8. Grade I diastolic dysfunction  Adult Transthoracic Echo Complete W/ Cont if Necessary Per Protocol   9. Shortness of breath  CBC & Differential    Adult Transthoracic Echo Complete W/ Cont if Necessary Per Protocol    Stress Test With Myocardial Perfusion One Day   10. Dyslipidemia  simvastatin (ZOCOR) 10 MG tablet   11. Peripheral edema     12. Healthcare maintenance  CBC & Differential   13. Benign prostatic hyperplasia with lower urinary tract symptoms, symptom details unspecified  tamsulosin (FLOMAX) 0.4 MG capsule 24 hr capsule   14. Paroxysmal atrial fibrillation (CMS/HCC)  amiodarone (PACERONE) 200 MG tablet    Adult Transthoracic Echo Complete W/ Cont if Necessary Per Protocol    Stress Test With Myocardial Perfusion One Day   15. Cardiomyopathy, unspecified type (CMS/HCC)  furosemide (LASIX) 40 MG tablet    Stress Test With Myocardial Perfusion One Day   16. Dizziness  Duplex Carotid Ultrasound CAR   17. Bilateral carotid artery stenosis  Duplex Carotid Ultrasound CAR       Return keep follow-up appt.  Chest pain/CAD/systolic heart failure/hypertension/pacemaker/ischemic cardiomyopathy/shortness of breath/diastolic dysfunction 1/A. fib-patient will have an ischemia work-up, stress and echo.  Dizziness/carotid artery disease-he will carotid artery ultrasound.  He will use nitro as needed for chest pain no resolution he will go to the ER.  He will continue his medication regimen for now.  Once I have his blood work which I  am ordering a CBC, CMP, BNP, lipid, mag and TSH today.  We will call with those results and let him know of any medication changes or adjustments.  He did have a chest x-ray which did not show any pulmonary edema on the 28th.  He will follow-up as scheduled or sooner if any changes or abnormalities with testing.    Patient was supposed to see pulmonologist yesterday but did not keep that appointment.       Kandice Nuñez  reports that he has quit smoking. His smoking use included cigarettes. He smoked 0.50 packs per day. He has never used smokeless tobacco.    Patient's Body mass index is 29.33 kg/m². BMI is above normal parameters. Recommendations include: educational material and referral to primary care.    Advance Care Planning   ACP discussion was held with the patient during this visit. Patient does not have an advance directive, information provided.      Electronically signed by:

## 2020-12-01 NOTE — PATIENT INSTRUCTIONS
"BMI for Adults  What is BMI?  Body mass index (BMI) is a number that is calculated from a person's weight and height. BMI can help estimate how much of a person's weight is composed of fat. BMI does not measure body fat directly. Rather, it is an alternative to procedures that directly measure body fat, which can be difficult and expensive.  BMI can help identify people who may be at higher risk for certain medical problems.  What are BMI measurements used for?  BMI is used as a screening tool to identify possible weight problems. It helps determine whether a person is obese, overweight, a healthy weight, or underweight.  BMI is useful for:  · Identifying a weight problem that may be related to a medical condition or may increase the risk for medical problems.  · Promoting changes, such as changes in diet and exercise, to help reach a healthy weight. BMI screening can be repeated to see if these changes are working.  How is BMI calculated?  BMI involves measuring your weight in relation to your height. Both height and weight are measured, and the BMI is calculated from those numbers. This can be done either in English ([place] or metric measurements. Note that charts and online BMI calculators are available to help you find your BMI quickly and easily without having to do these calculations yourself.  To calculate your BMI in English ([place] measurements:    1. Measure your weight in pounds (lb).  2. Multiply the number of pounds by 703.  ? For example, for a person who weighs 180 lb, multiply that number by 703, which equals 126,540.  3. Measure your height in inches. Then multiply that number by itself to get a measurement called \"inches squared.\"  ? For example, for a person who is 70 inches tall, the \"inches squared\" measurement is 70 inches x 70 inches, which equals 4,900 inches squared.  4. Divide the total from step 2 (number of lb x 703) by the total from step 3 (inches squared): 126,540 ÷ 4,900 = 25.8. This is " "your BMI.  To calculate your BMI in metric measurements:  1. Measure your weight in kilograms (kg).  2. Measure your height in meters (m). Then multiply that number by itself to get a measurement called \"meters squared.\"  ? For example, for a person who is 1.75 m tall, the \"meters squared\" measurement is 1.75 m x 1.75 m, which is equal to 3.1 meters squared.  3. Divide the number of kilograms (your weight) by the meters squared number. In this example: 70 ÷ 3.1 = 22.6. This is your BMI.  What do the results mean?  BMI charts are used to identify whether you are underweight, normal weight, overweight, or obese. The following guidelines will be used:  · Underweight: BMI less than 18.5.  · Normal weight: BMI between 18.5 and 24.9.  · Overweight: BMI between 25 and 29.9.  · Obese: BMI of 30 or above.  Keep these notes in mind:  · Weight includes both fat and muscle, so someone with a muscular build, such as an athlete, may have a BMI that is higher than 24.9. In cases like these, BMI is not an accurate measure of body fat.  · To determine if excess body fat is the cause of a BMI of 25 or higher, further assessments may need to be done by a health care provider.  · BMI is usually interpreted in the same way for men and women.  Where to find more information  For more information about BMI, including tools to quickly calculate your BMI, go to these websites:  · Centers for Disease Control and Prevention: www.cdc.gov  · American Heart Association: www.heart.org  · National Heart, Lung, and [employer] [place]: www.nhlbi.nih.gov  Summary  · Body mass index (BMI) is a number that is calculated from a person's weight and height.  · BMI may help estimate how much of a person's weight is composed of fat. BMI can help identify those who may be at higher risk for certain medical problems.  · BMI can be measured using English measurements or metric measurements.  · BMI charts are used to identify whether you are underweight, normal " weight, overweight, or obese.  This information is not intended to replace advice given to you by your health care provider. Make sure you discuss any questions you have with your health care provider.  Document Revised: 09/09/2020 Document Reviewed: 07/17/2020  Elsevier Patient Education © 2020 [employer]    Advance Care Planning and Advance Directives     You make decisions on a daily basis - decisions about where you want to live, your career, your home, your life. Perhaps one of the most important decisions you face is your choice for future medical care. Take time to talk with your family and your healthcare team and start planning today.  Advance Care Planning is a process that can help you:  · Understand possible future healthcare decisions in light of your own experiences  · Reflect on those decision in light of your goals and values  · Discuss your decisions with those closest to you and the healthcare professionals that care for you  · Make a plan by creating a document that reflects your wishes    Surrogate Decision Maker  In the event of a medical emergency, which has left you unable to communicate or to make your own decisions, you would need someone to make decisions for you.  It is important to discuss your preferences for medical treatment with this person while you are in good health.     Qualities of a surrogate decision maker:  • Willing to take on this role and responsibility  • Knows what you want for future medical care  • Willing to follow your wishes even if they don't agree with them  • Able to make difficult medical decisions under stressful circumstances    Advance Directives  These are legal documents you can create that will guide your healthcare team and decision maker(s) when needed. These documents can be stored in the electronic medical record.    · Living Will - a legal document to guide your care if you have a terminal condition or a serious illness and are unable to  communicate. States vary by statute in document names/types, but most forms may include one or more of the following:        -  Directions regarding life-prolonging treatments        -  Directions regarding artificially provided nutrition/hydration        -  Choosing a healthcare decision maker        -  Direction regarding organ/tissue donation    · Durable Power of  for Healthcare - this document names an -in-fact to make medical decisions for you, but it may also allow this person to make personal and financial decisions for you. Please seek the advice of an  if you need this type of document.    **Advance Directives are not required and no one may discriminate against you if you do not sign one.    Medical Orders  Many states allow specific forms/orders signed by your physician to record your wishes for medical treatment in your current state of health. This form, signed in personal communication with your physician, addresses resuscitation and other medical interventions that you may or may not want.      For more information or to schedule a time with a [facility] Advance Care Planning Facilitator contact: [facility] or call [phone] and someone will contact you directly.  Edema    Edema is an abnormal buildup of fluids in the body tissues and under the skin. Swelling of the legs, feet, and ankles is a common symptom that becomes more likely as you get older. Swelling is also common in looser tissues, like around the eyes. When the affected area is squeezed, the fluid may move out of that spot and leave a dent for a few moments. This dent is called pitting edema.  There are many possible causes of edema. Eating too much salt (sodium) and being on your feet or sitting for a long time can cause edema in your legs, feet, and ankles. Hot weather may make edema worse. Common causes of edema include:  · Heart failure.  · Liver or kidney disease.  · Weak leg blood  vessels.  · Cancer.  · An injury.  · Pregnancy.  · Medicines.  · Being obese.  · Low protein levels in the blood.  Edema is usually painless. Your skin may look swollen or shiny.  Follow these instructions at home:  · Keep the affected body part raised (elevated) above the level of your heart when you are sitting or lying down.  · Do not sit still or stand for long periods of time.  · Do not wear tight clothing. Do not wear garters on your upper legs.  · Exercise your legs to get your circulation going. This helps to move the fluid back into your blood vessels, and it may help the swelling go down.  · Wear elastic bandages or support stockings to reduce swelling as told by your health care provider.  · Eat a low-salt (low-sodium) diet to reduce fluid as told by your health care provider.  · Depending on the cause of your swelling, you may need to limit how much fluid you drink (fluid restriction).  · Take over-the-counter and prescription medicines only as told by your health care provider.  Contact a health care provider if:  · Your edema does not get better with treatment.  · You have heart, liver, or kidney disease and have symptoms of edema.  · You have sudden and unexplained weight gain.  Get help right away if:  · You develop shortness of breath or chest pain.  · You cannot breathe when you lie down.  · You develop pain, redness, or warmth in the swollen areas.  · You have heart, liver, or kidney disease and suddenly get edema.  · You have a fever and your symptoms suddenly get worse.  Summary  · Edema is an abnormal buildup of fluids in the body tissues and under the skin.  · Eating too much salt (sodium) and being on your feet or sitting for a long time can cause edema in your legs, feet, and ankles.  · Keep the affected body part raised (elevated) above the level of your heart when you are sitting or lying down.  This information is not intended to replace advice given to you by your health care provider.  Make sure you discuss any questions you have with your health care provider.  Document Revised: 05/06/2020 Document Reviewed: 01/20/2018  Elsevier Patient Education © 2020 [employer]    Nonspecific Chest Pain  Chest pain can be caused by many different conditions. Some causes of chest pain can be life-threatening. These will require treatment right away. Serious causes of chest pain include:  · Heart attack.  · A tear in the body's main blood vessel.  · Redness and swelling (inflammation) around your heart.  · Blood clot in your lungs.  Other causes of chest pain may not be so serious. These include:  · Heartburn.  · Anxiety or stress.  · Damage to bones or muscles in your chest.  · Lung infections.  Chest pain can feel like:  · Pain or discomfort in your chest.  · Crushing, pressure, aching, or squeezing pain.  · Burning or tingling.  · Dull or sharp pain that is worse when you move, cough, or take a deep breath.  · Pain or discomfort that is also felt in your back, neck, jaw, shoulder, or arm, or pain that spreads to any of these areas.  It is hard to know whether your pain is caused by something that is serious or something that is not so serious. So it is important to see your doctor right away if you have chest pain.  Follow these instructions at home:  Medicines  · Take over-the-counter and prescription medicines only as told by your doctor.  · If you were prescribed an antibiotic medicine, take it as told by your doctor. Do not stop taking the antibiotic even if you start to feel better.  Lifestyle    · Rest as told by your doctor.  · Do not use any products that contain nicotine or tobacco, such as cigarettes, e-cigarettes, and chewing tobacco. If you need help quitting, ask your doctor.  · Do not drink alcohol.  · Make lifestyle changes as told by your doctor. These may include:  ? Getting regular exercise. Ask your doctor what activities are safe for you.  ? Eating a heart-healthy diet. A diet and  nutrition specialist (dietitian) can help you to learn healthy eating options.  ? Staying at a healthy weight.  ? Treating diabetes or high blood pressure, if needed.  ? Lowering your stress. Activities such as yoga and relaxation techniques can help.  General instructions  · Pay attention to any changes in your symptoms. Tell your doctor about them or any new symptoms.  · Avoid any activities that cause chest pain.  · Keep all follow-up visits as told by your doctor. This is important. You may need more testing if your chest pain does not go away.  Contact a doctor if:  · Your chest pain does not go away.  · You feel depressed.  · You have a fever.  Get help right away if:  · Your chest pain is worse.  · You have a cough that gets worse, or you cough up blood.  · You have very bad (severe) pain in your belly (abdomen).  · You pass out (faint).  · You have either of these for no clear reason:  ? Sudden chest discomfort.  ? Sudden discomfort in your arms, back, neck, or jaw.  · You have shortness of breath at any time.  · You suddenly start to sweat, or your skin gets clammy.  · You feel sick to your stomach (nauseous).  · You throw up (vomit).  · You suddenly feel lightheaded or dizzy.  · You feel very weak or tired.  · Your heart starts to beat fast, or it feels like it is skipping beats.  These symptoms may be an emergency. Do not wait to see if the symptoms will go away. Get medical help right away. Call your local emergency services (911 in the [place] Do not drive yourself to the hospital.  Summary  · Chest pain can be caused by many different conditions. The cause may be serious and need treatment right away. If you have chest pain, see your doctor right away.  · Follow your doctor's instructions for taking medicines and making lifestyle changes.  · Keep all follow-up visits as told by your doctor. This includes visits for any further testing if your chest pain does not go away.  · Be sure to know the signs  that show that your condition has become worse. Get help right away if you have these symptoms.  This information is not intended to replace advice given to you by your health care provider. Make sure you discuss any questions you have with your health care provider.  Document Revised: 06/20/2019 Document Reviewed: 06/20/2019  Elsevier Patient Education © 2020 [employer]

## 2020-12-03 ENCOUNTER — TELEPHONE (OUTPATIENT)
Dept: CARDIOLOGY | Facility: CLINIC | Age: 75
End: 2020-12-03

## 2020-12-03 NOTE — TELEPHONE ENCOUNTER
Patient has been in hospital in [place] since he left here. HENRY CAMPUZANO    Tried calling patient. No answer, but left voicemail to return call. HENRY CAMPUZANO    ----- Message from MARCELA Escobedo sent at 12/2/2020  7:15 AM EST -----    Have patient take an additional 20 of lasix in am total of 60 in AM and reg 40 at lunch for 4 days.  Then go back to regular dose.  Also have him take an additional 10 meq of KCL during that time as well.  His HGB has dropped, but most likely due to all of the bleeding.  His WBCs are higher than when in hospital.  He needs to follow-up with PCP re: CBC.  I have forwarded.  Also TSH is underactive, can be due to Amio.  We will try to switch him to different antidysrhythmic after he recovers from fall and being sick and after work-up ordered.  He will need a repeat CBC in 1-2 weeks as well as BMP and MG.    Let them know they may need to hold the norvasc while taking the additional lasix due to lower blood pressure, and it will be lowe due to lower hgb as well.

## 2021-02-17 DIAGNOSIS — Z23 IMMUNIZATION DUE: ICD-10-CM

## 2021-05-18 DIAGNOSIS — R07.89 OTHER CHEST PAIN: ICD-10-CM

## 2021-05-18 RX ORDER — NITROGLYCERIN 0.4 MG/1
TABLET SUBLINGUAL
Qty: 25 TABLET | Refills: 3 | Status: SHIPPED | OUTPATIENT
Start: 2021-05-18

## 2021-09-24 ENCOUNTER — TELEPHONE (OUTPATIENT)
Dept: CARDIOLOGY | Facility: CLINIC | Age: 76
End: 2021-09-24

## 2021-09-24 NOTE — TELEPHONE ENCOUNTER
Called pt to reschedule the appointment they missed for today and was unable to reach them. Called pt's main #, it's disconnected, called his girlfriend's # and it's disconnected as well.   Called pt's cell #, someone answered and stated it was no longer his number.       Called PCP's office, spoke cecy/ Sonal, I asked if they have heard from pt, if they know if he's passed away, etc. She stated they have him marked as  since 2021.       I updated chart and informed Radha.

## 2024-07-09 LAB
MAXIMAL PREDICTED HEART RATE: 149 BPM
STRESS TARGET HR: 127 BPM

## 2024-09-18 LAB
MAXIMAL PREDICTED HEART RATE: 149 BPM
STRESS TARGET HR: 127 BPM